# Patient Record
Sex: FEMALE | Race: WHITE
[De-identification: names, ages, dates, MRNs, and addresses within clinical notes are randomized per-mention and may not be internally consistent; named-entity substitution may affect disease eponyms.]

---

## 2017-03-02 ENCOUNTER — RECORDS - HEALTHEAST (OUTPATIENT)
Dept: ADMINISTRATIVE | Facility: OTHER | Age: 62
End: 2017-03-02

## 2017-03-24 ENCOUNTER — RECORDS - HEALTHEAST (OUTPATIENT)
Dept: ADMINISTRATIVE | Facility: OTHER | Age: 62
End: 2017-03-24

## 2017-05-01 ENCOUNTER — RECORDS - HEALTHEAST (OUTPATIENT)
Dept: ADMINISTRATIVE | Facility: OTHER | Age: 62
End: 2017-05-01

## 2017-05-15 ENCOUNTER — RECORDS - HEALTHEAST (OUTPATIENT)
Dept: ADMINISTRATIVE | Facility: OTHER | Age: 62
End: 2017-05-15

## 2017-05-16 ENCOUNTER — AMBULATORY - HEALTHEAST (OUTPATIENT)
Dept: SURGERY | Facility: CLINIC | Age: 62
End: 2017-05-16

## 2017-05-16 DIAGNOSIS — N63.20 LUMP OF BREAST, LEFT: ICD-10-CM

## 2017-05-16 DIAGNOSIS — C50.919 BREAST CANCER (H): ICD-10-CM

## 2017-05-17 LAB
LAB AP CHARGES (HE HISTORICAL CONVERSION): ABNORMAL
LAB MED GENERAL PATH INTERP (HE HISTORICAL CONVERSION): ABNORMAL
PATH REPORT.COMMENTS IMP SPEC: ABNORMAL
PATH REPORT.FINAL DX SPEC: ABNORMAL
PATH REPORT.MICROSCOPIC SPEC OTHER STN: ABNORMAL
PATH REPORT.RELEVANT HX SPEC: ABNORMAL
RESULT FLAG (HE HISTORICAL CONVERSION): ABNORMAL
SPECIMEN DESCRIPTION: ABNORMAL

## 2017-05-25 ENCOUNTER — OFFICE VISIT - HEALTHEAST (OUTPATIENT)
Dept: SURGERY | Facility: CLINIC | Age: 62
End: 2017-05-25

## 2017-05-25 DIAGNOSIS — C50.412 MALIGNANT NEOPLASM OF UPPER-OUTER QUADRANT OF LEFT FEMALE BREAST (H): ICD-10-CM

## 2017-05-25 ASSESSMENT — MIFFLIN-ST. JEOR: SCORE: 1235.23

## 2017-05-26 ENCOUNTER — AMBULATORY - HEALTHEAST (OUTPATIENT)
Dept: SURGERY | Facility: CLINIC | Age: 62
End: 2017-05-26

## 2017-05-31 ENCOUNTER — RECORDS - HEALTHEAST (OUTPATIENT)
Dept: ADMINISTRATIVE | Facility: OTHER | Age: 62
End: 2017-05-31

## 2017-05-31 ENCOUNTER — AMBULATORY - HEALTHEAST (OUTPATIENT)
Dept: SURGERY | Facility: CLINIC | Age: 62
End: 2017-05-31

## 2017-06-09 ENCOUNTER — COMMUNICATION - HEALTHEAST (OUTPATIENT)
Dept: ONCOLOGY | Facility: HOSPITAL | Age: 62
End: 2017-06-09

## 2017-06-09 ENCOUNTER — OFFICE VISIT - HEALTHEAST (OUTPATIENT)
Dept: SURGERY | Facility: CLINIC | Age: 62
End: 2017-06-09

## 2017-06-09 DIAGNOSIS — C50.412 MALIGNANT NEOPLASM OF UPPER-OUTER QUADRANT OF LEFT FEMALE BREAST (H): ICD-10-CM

## 2017-06-13 ENCOUNTER — HOSPITAL ENCOUNTER (OUTPATIENT)
Dept: PET IMAGING | Facility: HOSPITAL | Age: 62
Discharge: HOME | End: 2017-06-13
Attending: SPECIALIST

## 2017-06-13 DIAGNOSIS — C50.412 MALIGNANT NEOPLASM OF UPPER-OUTER QUADRANT OF LEFT FEMALE BREAST (H): ICD-10-CM

## 2017-06-13 ASSESSMENT — MIFFLIN-ST. JEOR: SCORE: 1221.74

## 2017-06-16 ENCOUNTER — OFFICE VISIT - HEALTHEAST (OUTPATIENT)
Dept: ONCOLOGY | Facility: HOSPITAL | Age: 62
End: 2017-06-16

## 2017-06-16 ENCOUNTER — AMBULATORY - HEALTHEAST (OUTPATIENT)
Dept: ONCOLOGY | Facility: HOSPITAL | Age: 62
End: 2017-06-16

## 2017-06-16 DIAGNOSIS — C50.412 MALIGNANT NEOPLASM OF UPPER-OUTER QUADRANT OF LEFT FEMALE BREAST (H): ICD-10-CM

## 2017-06-16 ASSESSMENT — MIFFLIN-ST. JEOR: SCORE: 1228.31

## 2017-06-19 ENCOUNTER — COMMUNICATION - HEALTHEAST (OUTPATIENT)
Dept: ONCOLOGY | Facility: HOSPITAL | Age: 62
End: 2017-06-19

## 2017-06-22 ENCOUNTER — AMBULATORY - HEALTHEAST (OUTPATIENT)
Dept: ONCOLOGY | Facility: HOSPITAL | Age: 62
End: 2017-06-22

## 2017-06-22 ENCOUNTER — COMMUNICATION - HEALTHEAST (OUTPATIENT)
Dept: ONCOLOGY | Facility: HOSPITAL | Age: 62
End: 2017-06-22

## 2017-06-22 ENCOUNTER — AMBULATORY - HEALTHEAST (OUTPATIENT)
Dept: INFUSION THERAPY | Facility: HOSPITAL | Age: 62
End: 2017-06-22

## 2017-06-22 ENCOUNTER — HOSPITAL ENCOUNTER (OUTPATIENT)
Dept: NUCLEAR MEDICINE | Facility: HOSPITAL | Age: 62
Discharge: HOME | End: 2017-06-22
Attending: INTERNAL MEDICINE

## 2017-06-22 DIAGNOSIS — C50.412 MALIGNANT NEOPLASM OF UPPER-OUTER QUADRANT OF LEFT FEMALE BREAST (H): ICD-10-CM

## 2017-06-22 LAB — MUGA LV EJECTION FRACTION: 59.4 %

## 2017-06-23 ENCOUNTER — OFFICE VISIT - HEALTHEAST (OUTPATIENT)
Dept: ONCOLOGY | Facility: HOSPITAL | Age: 62
End: 2017-06-23

## 2017-06-23 ENCOUNTER — AMBULATORY - HEALTHEAST (OUTPATIENT)
Dept: ONCOLOGY | Facility: HOSPITAL | Age: 62
End: 2017-06-23

## 2017-06-23 ENCOUNTER — INFUSION - HEALTHEAST (OUTPATIENT)
Dept: INFUSION THERAPY | Facility: HOSPITAL | Age: 62
End: 2017-06-23

## 2017-06-23 ENCOUNTER — AMBULATORY - HEALTHEAST (OUTPATIENT)
Dept: INFUSION THERAPY | Facility: HOSPITAL | Age: 62
End: 2017-06-23

## 2017-06-23 DIAGNOSIS — C50.412 MALIGNANT NEOPLASM OF UPPER-OUTER QUADRANT OF LEFT FEMALE BREAST (H): ICD-10-CM

## 2017-06-26 ENCOUNTER — COMMUNICATION - HEALTHEAST (OUTPATIENT)
Dept: ONCOLOGY | Facility: HOSPITAL | Age: 62
End: 2017-06-26

## 2017-06-28 ENCOUNTER — COMMUNICATION - HEALTHEAST (OUTPATIENT)
Dept: ONCOLOGY | Facility: HOSPITAL | Age: 62
End: 2017-06-28

## 2017-06-29 ENCOUNTER — AMBULATORY - HEALTHEAST (OUTPATIENT)
Dept: ONCOLOGY | Facility: HOSPITAL | Age: 62
End: 2017-06-29

## 2017-06-30 ENCOUNTER — AMBULATORY - HEALTHEAST (OUTPATIENT)
Dept: INFUSION THERAPY | Facility: HOSPITAL | Age: 62
End: 2017-06-30

## 2017-06-30 ENCOUNTER — OFFICE VISIT - HEALTHEAST (OUTPATIENT)
Dept: ONCOLOGY | Facility: HOSPITAL | Age: 62
End: 2017-06-30

## 2017-06-30 ENCOUNTER — AMBULATORY - HEALTHEAST (OUTPATIENT)
Dept: ONCOLOGY | Facility: HOSPITAL | Age: 62
End: 2017-06-30

## 2017-06-30 DIAGNOSIS — C50.412 MALIGNANT NEOPLASM OF UPPER-OUTER QUADRANT OF LEFT FEMALE BREAST (H): ICD-10-CM

## 2017-06-30 ASSESSMENT — MIFFLIN-ST. JEOR: SCORE: 1210.62

## 2017-07-03 ENCOUNTER — COMMUNICATION - HEALTHEAST (OUTPATIENT)
Dept: ONCOLOGY | Facility: HOSPITAL | Age: 62
End: 2017-07-03

## 2017-07-07 ENCOUNTER — OFFICE VISIT - HEALTHEAST (OUTPATIENT)
Dept: ONCOLOGY | Facility: HOSPITAL | Age: 62
End: 2017-07-07

## 2017-07-07 DIAGNOSIS — C50.412 MALIGNANT NEOPLASM OF UPPER-OUTER QUADRANT OF LEFT FEMALE BREAST (H): ICD-10-CM

## 2017-07-07 DIAGNOSIS — Z09 CHEMOTHERAPY FOLLOW-UP EXAMINATION: ICD-10-CM

## 2017-07-14 ENCOUNTER — HOSPITAL ENCOUNTER (OUTPATIENT)
Dept: RADIOLOGY | Facility: HOSPITAL | Age: 62
Discharge: HOME | End: 2017-07-14
Attending: INTERNAL MEDICINE

## 2017-07-14 ENCOUNTER — OFFICE VISIT - HEALTHEAST (OUTPATIENT)
Dept: ONCOLOGY | Facility: HOSPITAL | Age: 62
End: 2017-07-14

## 2017-07-14 ENCOUNTER — INFUSION - HEALTHEAST (OUTPATIENT)
Dept: INFUSION THERAPY | Facility: HOSPITAL | Age: 62
End: 2017-07-14

## 2017-07-14 ENCOUNTER — AMBULATORY - HEALTHEAST (OUTPATIENT)
Dept: INFUSION THERAPY | Facility: HOSPITAL | Age: 62
End: 2017-07-14

## 2017-07-14 DIAGNOSIS — C50.412 MALIGNANT NEOPLASM OF UPPER-OUTER QUADRANT OF LEFT FEMALE BREAST (H): ICD-10-CM

## 2017-07-14 DIAGNOSIS — F41.9 ANXIETY: ICD-10-CM

## 2017-07-14 DIAGNOSIS — F33.9 RECURRENT MAJOR DEPRESSIVE DISORDER (H): ICD-10-CM

## 2017-07-14 DIAGNOSIS — R05.9 COUGH: ICD-10-CM

## 2017-07-14 DIAGNOSIS — I89.0 LYMPHEDEMA OF LEFT ARM: ICD-10-CM

## 2017-07-14 ASSESSMENT — MIFFLIN-ST. JEOR: SCORE: 1224.68

## 2017-07-20 ENCOUNTER — COMMUNICATION - HEALTHEAST (OUTPATIENT)
Dept: ONCOLOGY | Facility: HOSPITAL | Age: 62
End: 2017-07-20

## 2017-07-26 ENCOUNTER — COMMUNICATION - HEALTHEAST (OUTPATIENT)
Dept: ONCOLOGY | Facility: HOSPITAL | Age: 62
End: 2017-07-26

## 2017-08-04 ENCOUNTER — COMMUNICATION - HEALTHEAST (OUTPATIENT)
Dept: ONCOLOGY | Facility: HOSPITAL | Age: 62
End: 2017-08-04

## 2017-08-04 ENCOUNTER — AMBULATORY - HEALTHEAST (OUTPATIENT)
Dept: INFUSION THERAPY | Facility: HOSPITAL | Age: 62
End: 2017-08-04

## 2017-08-04 ENCOUNTER — OFFICE VISIT - HEALTHEAST (OUTPATIENT)
Dept: ONCOLOGY | Facility: HOSPITAL | Age: 62
End: 2017-08-04

## 2017-08-04 ENCOUNTER — INFUSION - HEALTHEAST (OUTPATIENT)
Dept: INFUSION THERAPY | Facility: HOSPITAL | Age: 62
End: 2017-08-04

## 2017-08-04 DIAGNOSIS — C50.412 MALIGNANT NEOPLASM OF UPPER-OUTER QUADRANT OF LEFT FEMALE BREAST (H): ICD-10-CM

## 2017-08-18 ENCOUNTER — COMMUNICATION - HEALTHEAST (OUTPATIENT)
Dept: ONCOLOGY | Facility: HOSPITAL | Age: 62
End: 2017-08-18

## 2017-08-21 ENCOUNTER — COMMUNICATION - HEALTHEAST (OUTPATIENT)
Dept: ONCOLOGY | Facility: HOSPITAL | Age: 62
End: 2017-08-21

## 2017-08-25 ENCOUNTER — COMMUNICATION - HEALTHEAST (OUTPATIENT)
Dept: ONCOLOGY | Facility: HOSPITAL | Age: 62
End: 2017-08-25

## 2017-08-25 ENCOUNTER — AMBULATORY - HEALTHEAST (OUTPATIENT)
Dept: ONCOLOGY | Facility: HOSPITAL | Age: 62
End: 2017-08-25

## 2017-08-25 ENCOUNTER — OFFICE VISIT - HEALTHEAST (OUTPATIENT)
Dept: ONCOLOGY | Facility: HOSPITAL | Age: 62
End: 2017-08-25

## 2017-08-25 ENCOUNTER — INFUSION - HEALTHEAST (OUTPATIENT)
Dept: INFUSION THERAPY | Facility: HOSPITAL | Age: 62
End: 2017-08-25

## 2017-08-25 ENCOUNTER — AMBULATORY - HEALTHEAST (OUTPATIENT)
Dept: INFUSION THERAPY | Facility: HOSPITAL | Age: 62
End: 2017-08-25

## 2017-08-25 DIAGNOSIS — C50.412 MALIGNANT NEOPLASM OF UPPER-OUTER QUADRANT OF LEFT FEMALE BREAST (H): ICD-10-CM

## 2017-08-28 ENCOUNTER — COMMUNICATION - HEALTHEAST (OUTPATIENT)
Dept: ONCOLOGY | Facility: HOSPITAL | Age: 62
End: 2017-08-28

## 2017-09-15 ENCOUNTER — INFUSION - HEALTHEAST (OUTPATIENT)
Dept: INFUSION THERAPY | Facility: HOSPITAL | Age: 62
End: 2017-09-15

## 2017-09-15 ENCOUNTER — OFFICE VISIT - HEALTHEAST (OUTPATIENT)
Dept: ONCOLOGY | Facility: HOSPITAL | Age: 62
End: 2017-09-15

## 2017-09-15 ENCOUNTER — HOSPITAL ENCOUNTER (OUTPATIENT)
Dept: NUCLEAR MEDICINE | Facility: HOSPITAL | Age: 62
Setting detail: RADIATION/ONCOLOGY SERIES
Discharge: STILL ADMITTED OR ADMINISTRATIVE | End: 2017-09-15
Attending: INTERNAL MEDICINE

## 2017-09-15 ENCOUNTER — AMBULATORY - HEALTHEAST (OUTPATIENT)
Dept: INFUSION THERAPY | Facility: HOSPITAL | Age: 62
End: 2017-09-15

## 2017-09-15 ENCOUNTER — COMMUNICATION - HEALTHEAST (OUTPATIENT)
Dept: ONCOLOGY | Facility: HOSPITAL | Age: 62
End: 2017-09-15

## 2017-09-15 DIAGNOSIS — C50.412 MALIGNANT NEOPLASM OF UPPER-OUTER QUADRANT OF LEFT FEMALE BREAST (H): ICD-10-CM

## 2017-09-15 LAB — MUGA LV EJECTION FRACTION: 59 %

## 2017-09-18 ENCOUNTER — COMMUNICATION - HEALTHEAST (OUTPATIENT)
Dept: ONCOLOGY | Facility: HOSPITAL | Age: 62
End: 2017-09-18

## 2017-09-21 ENCOUNTER — AMBULATORY - HEALTHEAST (OUTPATIENT)
Dept: NUTRITION | Facility: HOSPITAL | Age: 62
End: 2017-09-21

## 2017-09-25 ENCOUNTER — OFFICE VISIT - HEALTHEAST (OUTPATIENT)
Dept: VASCULAR SURGERY | Facility: CLINIC | Age: 62
End: 2017-09-25

## 2017-09-25 DIAGNOSIS — M24.512 CONTRACTURE, LEFT SHOULDER: ICD-10-CM

## 2017-09-25 DIAGNOSIS — L90.5 SCAR CONDITION AND FIBROSIS OF SKIN: ICD-10-CM

## 2017-09-25 DIAGNOSIS — C50.412 MALIGNANT NEOPLASM OF UPPER-OUTER QUADRANT OF LEFT FEMALE BREAST (H): ICD-10-CM

## 2017-09-25 DIAGNOSIS — I97.2 POST-MASTECTOMY LYMPHEDEMA SYNDROME: ICD-10-CM

## 2017-10-02 ENCOUNTER — COMMUNICATION - HEALTHEAST (OUTPATIENT)
Dept: ONCOLOGY | Facility: HOSPITAL | Age: 62
End: 2017-10-02

## 2017-10-02 DIAGNOSIS — C50.412 MALIGNANT NEOPLASM OF UPPER-OUTER QUADRANT OF LEFT FEMALE BREAST (H): ICD-10-CM

## 2017-10-06 ENCOUNTER — OFFICE VISIT - HEALTHEAST (OUTPATIENT)
Dept: ONCOLOGY | Facility: HOSPITAL | Age: 62
End: 2017-10-06

## 2017-10-06 ENCOUNTER — INFUSION - HEALTHEAST (OUTPATIENT)
Dept: INFUSION THERAPY | Facility: HOSPITAL | Age: 62
End: 2017-10-06

## 2017-10-06 ENCOUNTER — AMBULATORY - HEALTHEAST (OUTPATIENT)
Dept: ONCOLOGY | Facility: HOSPITAL | Age: 62
End: 2017-10-06

## 2017-10-06 ENCOUNTER — AMBULATORY - HEALTHEAST (OUTPATIENT)
Dept: INFUSION THERAPY | Facility: HOSPITAL | Age: 62
End: 2017-10-06

## 2017-10-06 DIAGNOSIS — C50.412 MALIGNANT NEOPLASM OF UPPER-OUTER QUADRANT OF LEFT BREAST IN FEMALE, ESTROGEN RECEPTOR POSITIVE (H): ICD-10-CM

## 2017-10-06 DIAGNOSIS — Z17.0 MALIGNANT NEOPLASM OF UPPER-OUTER QUADRANT OF LEFT BREAST IN FEMALE, ESTROGEN RECEPTOR POSITIVE (H): ICD-10-CM

## 2017-10-06 DIAGNOSIS — C50.412 MALIGNANT NEOPLASM OF UPPER-OUTER QUADRANT OF LEFT FEMALE BREAST (H): ICD-10-CM

## 2017-10-06 RX ORDER — LORAZEPAM 0.5 MG/1
0.5 TABLET ORAL DAILY PRN
Status: SHIPPED | COMMUNITY
Start: 2017-09-26

## 2017-10-09 ENCOUNTER — OFFICE VISIT - HEALTHEAST (OUTPATIENT)
Dept: PHYSICAL THERAPY | Facility: REHABILITATION | Age: 62
End: 2017-10-09

## 2017-10-09 DIAGNOSIS — I97.2 POST-MASTECTOMY LYMPHEDEMA SYNDROME: ICD-10-CM

## 2017-10-09 DIAGNOSIS — M24.512 CONTRACTURE, LEFT SHOULDER: ICD-10-CM

## 2017-10-09 DIAGNOSIS — L90.5 SCAR CONDITION AND FIBROSIS OF SKIN: ICD-10-CM

## 2017-10-10 ENCOUNTER — COMMUNICATION - HEALTHEAST (OUTPATIENT)
Dept: ONCOLOGY | Facility: HOSPITAL | Age: 62
End: 2017-10-10

## 2017-10-13 ENCOUNTER — OFFICE VISIT - HEALTHEAST (OUTPATIENT)
Dept: SURGERY | Facility: CLINIC | Age: 62
End: 2017-10-13

## 2017-10-13 DIAGNOSIS — Z17.0 MALIGNANT NEOPLASM OF UPPER-OUTER QUADRANT OF LEFT BREAST IN FEMALE, ESTROGEN RECEPTOR POSITIVE (H): ICD-10-CM

## 2017-10-13 DIAGNOSIS — C50.412 MALIGNANT NEOPLASM OF UPPER-OUTER QUADRANT OF LEFT BREAST IN FEMALE, ESTROGEN RECEPTOR POSITIVE (H): ICD-10-CM

## 2017-10-16 ENCOUNTER — RECORDS - HEALTHEAST (OUTPATIENT)
Dept: ADMINISTRATIVE | Facility: OTHER | Age: 62
End: 2017-10-16

## 2017-10-19 ENCOUNTER — HOSPITAL ENCOUNTER (OUTPATIENT)
Dept: MRI IMAGING | Facility: HOSPITAL | Age: 62
Discharge: HOME | End: 2017-10-19
Attending: SPECIALIST

## 2017-10-19 DIAGNOSIS — Z17.0 MALIGNANT NEOPLASM OF UPPER-OUTER QUADRANT OF LEFT BREAST IN FEMALE, ESTROGEN RECEPTOR POSITIVE (H): ICD-10-CM

## 2017-10-19 DIAGNOSIS — C50.412 MALIGNANT NEOPLASM OF UPPER-OUTER QUADRANT OF LEFT BREAST IN FEMALE, ESTROGEN RECEPTOR POSITIVE (H): ICD-10-CM

## 2017-10-23 ENCOUNTER — AMBULATORY - HEALTHEAST (OUTPATIENT)
Dept: SURGERY | Facility: CLINIC | Age: 62
End: 2017-10-23

## 2017-10-23 DIAGNOSIS — Z17.1 MALIGNANT NEOPLASM OF CENTRAL PORTION OF LEFT BREAST IN FEMALE, ESTROGEN RECEPTOR NEGATIVE (H): ICD-10-CM

## 2017-10-23 DIAGNOSIS — C50.112 MALIGNANT NEOPLASM OF CENTRAL PORTION OF LEFT BREAST IN FEMALE, ESTROGEN RECEPTOR NEGATIVE (H): ICD-10-CM

## 2017-10-24 ENCOUNTER — OFFICE VISIT - HEALTHEAST (OUTPATIENT)
Dept: PHYSICAL THERAPY | Facility: REHABILITATION | Age: 62
End: 2017-10-24

## 2017-10-24 DIAGNOSIS — M24.512 CONTRACTURE, LEFT SHOULDER: ICD-10-CM

## 2017-10-24 DIAGNOSIS — I97.2 POST-MASTECTOMY LYMPHEDEMA SYNDROME: ICD-10-CM

## 2017-10-24 DIAGNOSIS — L90.5 SCAR CONDITION AND FIBROSIS OF SKIN: ICD-10-CM

## 2017-10-25 ENCOUNTER — COMMUNICATION - HEALTHEAST (OUTPATIENT)
Dept: ONCOLOGY | Facility: HOSPITAL | Age: 62
End: 2017-10-25

## 2017-10-26 ENCOUNTER — COMMUNICATION - HEALTHEAST (OUTPATIENT)
Dept: ONCOLOGY | Facility: CLINIC | Age: 62
End: 2017-10-26

## 2017-10-26 ENCOUNTER — OFFICE VISIT - HEALTHEAST (OUTPATIENT)
Dept: PHYSICAL THERAPY | Facility: REHABILITATION | Age: 62
End: 2017-10-26

## 2017-10-26 DIAGNOSIS — M24.512 CONTRACTURE, LEFT SHOULDER: ICD-10-CM

## 2017-10-26 DIAGNOSIS — I97.2 POST-MASTECTOMY LYMPHEDEMA SYNDROME: ICD-10-CM

## 2017-10-26 DIAGNOSIS — L90.5 SCAR CONDITION AND FIBROSIS OF SKIN: ICD-10-CM

## 2017-10-27 ENCOUNTER — AMBULATORY - HEALTHEAST (OUTPATIENT)
Dept: INFUSION THERAPY | Facility: HOSPITAL | Age: 62
End: 2017-10-27

## 2017-10-27 ENCOUNTER — INFUSION - HEALTHEAST (OUTPATIENT)
Dept: INFUSION THERAPY | Facility: HOSPITAL | Age: 62
End: 2017-10-27

## 2017-10-27 ENCOUNTER — OFFICE VISIT - HEALTHEAST (OUTPATIENT)
Dept: ONCOLOGY | Facility: HOSPITAL | Age: 62
End: 2017-10-27

## 2017-10-27 DIAGNOSIS — T45.1X5A ANEMIA ASSOCIATED WITH CHEMOTHERAPY: ICD-10-CM

## 2017-10-27 DIAGNOSIS — D69.59 CHEMOTHERAPY-INDUCED THROMBOCYTOPENIA: ICD-10-CM

## 2017-10-27 DIAGNOSIS — C50.412 MALIGNANT NEOPLASM OF UPPER-OUTER QUADRANT OF LEFT BREAST IN FEMALE, ESTROGEN RECEPTOR POSITIVE (H): ICD-10-CM

## 2017-10-27 DIAGNOSIS — Z17.0 MALIGNANT NEOPLASM OF UPPER-OUTER QUADRANT OF LEFT BREAST IN FEMALE, ESTROGEN RECEPTOR POSITIVE (H): ICD-10-CM

## 2017-10-27 DIAGNOSIS — T45.1X5A CHEMOTHERAPY-INDUCED THROMBOCYTOPENIA: ICD-10-CM

## 2017-10-27 DIAGNOSIS — D64.81 ANEMIA ASSOCIATED WITH CHEMOTHERAPY: ICD-10-CM

## 2017-10-27 DIAGNOSIS — F41.9 ANXIETY: ICD-10-CM

## 2017-10-30 ENCOUNTER — COMMUNICATION - HEALTHEAST (OUTPATIENT)
Dept: ONCOLOGY | Facility: HOSPITAL | Age: 62
End: 2017-10-30

## 2017-10-31 ENCOUNTER — OFFICE VISIT - HEALTHEAST (OUTPATIENT)
Dept: PHYSICAL THERAPY | Facility: REHABILITATION | Age: 62
End: 2017-10-31

## 2017-10-31 DIAGNOSIS — L90.5 SCAR CONDITION AND FIBROSIS OF SKIN: ICD-10-CM

## 2017-10-31 DIAGNOSIS — M24.512 CONTRACTURE, LEFT SHOULDER: ICD-10-CM

## 2017-10-31 DIAGNOSIS — I97.2 POST-MASTECTOMY LYMPHEDEMA SYNDROME: ICD-10-CM

## 2017-11-02 ENCOUNTER — HOSPITAL ENCOUNTER (OUTPATIENT)
Dept: NUTRITION | Facility: HOSPITAL | Age: 62
Discharge: HOME | End: 2017-11-02

## 2017-11-02 ENCOUNTER — OFFICE VISIT - HEALTHEAST (OUTPATIENT)
Dept: PHYSICAL THERAPY | Facility: REHABILITATION | Age: 62
End: 2017-11-02

## 2017-11-02 DIAGNOSIS — I97.2 POST-MASTECTOMY LYMPHEDEMA SYNDROME: ICD-10-CM

## 2017-11-02 DIAGNOSIS — Z17.0 MALIGNANT NEOPLASM OF UPPER-OUTER QUADRANT OF LEFT BREAST IN FEMALE, ESTROGEN RECEPTOR POSITIVE (H): ICD-10-CM

## 2017-11-02 DIAGNOSIS — C50.412 MALIGNANT NEOPLASM OF UPPER-OUTER QUADRANT OF LEFT BREAST IN FEMALE, ESTROGEN RECEPTOR POSITIVE (H): ICD-10-CM

## 2017-11-02 DIAGNOSIS — L90.5 SCAR CONDITION AND FIBROSIS OF SKIN: ICD-10-CM

## 2017-11-02 DIAGNOSIS — M24.512 CONTRACTURE, LEFT SHOULDER: ICD-10-CM

## 2017-11-03 ENCOUNTER — OFFICE VISIT - HEALTHEAST (OUTPATIENT)
Dept: PHYSICAL THERAPY | Facility: REHABILITATION | Age: 62
End: 2017-11-03

## 2017-11-03 DIAGNOSIS — I97.2 POST-MASTECTOMY LYMPHEDEMA SYNDROME: ICD-10-CM

## 2017-11-03 DIAGNOSIS — M24.512 CONTRACTURE, LEFT SHOULDER: ICD-10-CM

## 2017-11-03 DIAGNOSIS — L90.5 SCAR CONDITION AND FIBROSIS OF SKIN: ICD-10-CM

## 2017-11-06 ENCOUNTER — OFFICE VISIT - HEALTHEAST (OUTPATIENT)
Dept: PHYSICAL THERAPY | Facility: REHABILITATION | Age: 62
End: 2017-11-06

## 2017-11-06 DIAGNOSIS — Z17.0 MALIGNANT NEOPLASM OF UPPER-OUTER QUADRANT OF LEFT BREAST IN FEMALE, ESTROGEN RECEPTOR POSITIVE (H): ICD-10-CM

## 2017-11-06 DIAGNOSIS — C50.412 MALIGNANT NEOPLASM OF UPPER-OUTER QUADRANT OF LEFT BREAST IN FEMALE, ESTROGEN RECEPTOR POSITIVE (H): ICD-10-CM

## 2017-11-06 DIAGNOSIS — L90.5 SCAR CONDITION AND FIBROSIS OF SKIN: ICD-10-CM

## 2017-11-06 DIAGNOSIS — I97.2 POST-MASTECTOMY LYMPHEDEMA SYNDROME: ICD-10-CM

## 2017-11-06 DIAGNOSIS — M24.512 CONTRACTURE, LEFT SHOULDER: ICD-10-CM

## 2017-11-10 ENCOUNTER — COMMUNICATION - HEALTHEAST (OUTPATIENT)
Dept: ONCOLOGY | Facility: HOSPITAL | Age: 62
End: 2017-11-10

## 2017-11-10 ENCOUNTER — OFFICE VISIT - HEALTHEAST (OUTPATIENT)
Dept: RADIATION ONCOLOGY | Facility: HOSPITAL | Age: 62
End: 2017-11-10

## 2017-11-10 DIAGNOSIS — Z17.0 MALIGNANT NEOPLASM OF UPPER-OUTER QUADRANT OF LEFT BREAST IN FEMALE, ESTROGEN RECEPTOR POSITIVE (H): ICD-10-CM

## 2017-11-10 DIAGNOSIS — C50.412 MALIGNANT NEOPLASM OF UPPER-OUTER QUADRANT OF LEFT BREAST IN FEMALE, ESTROGEN RECEPTOR POSITIVE (H): ICD-10-CM

## 2017-11-13 ENCOUNTER — OFFICE VISIT - HEALTHEAST (OUTPATIENT)
Dept: PHYSICAL THERAPY | Facility: REHABILITATION | Age: 62
End: 2017-11-13

## 2017-11-13 DIAGNOSIS — I97.2 POST-MASTECTOMY LYMPHEDEMA SYNDROME: ICD-10-CM

## 2017-11-13 DIAGNOSIS — L90.5 SCAR CONDITION AND FIBROSIS OF SKIN: ICD-10-CM

## 2017-11-13 DIAGNOSIS — M24.512 CONTRACTURE, LEFT SHOULDER: ICD-10-CM

## 2017-11-17 ENCOUNTER — INFUSION - HEALTHEAST (OUTPATIENT)
Dept: INFUSION THERAPY | Facility: HOSPITAL | Age: 62
End: 2017-11-17

## 2017-11-17 ENCOUNTER — AMBULATORY - HEALTHEAST (OUTPATIENT)
Dept: RADIATION ONCOLOGY | Facility: HOSPITAL | Age: 62
End: 2017-11-17

## 2017-11-17 ENCOUNTER — AMBULATORY - HEALTHEAST (OUTPATIENT)
Dept: ONCOLOGY | Facility: HOSPITAL | Age: 62
End: 2017-11-17

## 2017-11-17 DIAGNOSIS — Z17.0 MALIGNANT NEOPLASM OF UPPER-OUTER QUADRANT OF LEFT BREAST IN FEMALE, ESTROGEN RECEPTOR POSITIVE (H): ICD-10-CM

## 2017-11-17 DIAGNOSIS — C50.412 MALIGNANT NEOPLASM OF UPPER-OUTER QUADRANT OF LEFT BREAST IN FEMALE, ESTROGEN RECEPTOR POSITIVE (H): ICD-10-CM

## 2017-11-17 LAB
CREAT SERPL-MCNC: 0.67 MG/DL (ref 0.6–1.1)
GFR SERPL CREATININE-BSD FRML MDRD: >60 ML/MIN/1.73M2

## 2017-11-20 ENCOUNTER — AMBULATORY - HEALTHEAST (OUTPATIENT)
Dept: RADIATION ONCOLOGY | Facility: HOSPITAL | Age: 62
End: 2017-11-20

## 2017-11-20 DIAGNOSIS — Z17.0 MALIGNANT NEOPLASM OF UPPER-OUTER QUADRANT OF LEFT BREAST IN FEMALE, ESTROGEN RECEPTOR POSITIVE (H): ICD-10-CM

## 2017-11-20 DIAGNOSIS — C50.412 MALIGNANT NEOPLASM OF UPPER-OUTER QUADRANT OF LEFT BREAST IN FEMALE, ESTROGEN RECEPTOR POSITIVE (H): ICD-10-CM

## 2017-11-21 ENCOUNTER — COMMUNICATION - HEALTHEAST (OUTPATIENT)
Dept: ONCOLOGY | Facility: HOSPITAL | Age: 62
End: 2017-11-21

## 2017-11-22 ENCOUNTER — HOSPITAL ENCOUNTER (OUTPATIENT)
Dept: PET IMAGING | Facility: HOSPITAL | Age: 62
Discharge: HOME | End: 2017-11-22
Attending: RADIOLOGY

## 2017-11-22 ENCOUNTER — HOSPITAL ENCOUNTER (OUTPATIENT)
Dept: PET IMAGING | Facility: HOSPITAL | Age: 62
Setting detail: RADIATION/ONCOLOGY SERIES
Discharge: STILL ADMITTED OR ADMINISTRATIVE | End: 2017-11-22
Attending: RADIOLOGY

## 2017-11-22 DIAGNOSIS — C50.412 MALIGNANT NEOPLASM OF UPPER-OUTER QUADRANT OF LEFT BREAST IN FEMALE, ESTROGEN RECEPTOR POSITIVE (H): ICD-10-CM

## 2017-11-22 DIAGNOSIS — Z17.0 MALIGNANT NEOPLASM OF UPPER-OUTER QUADRANT OF LEFT BREAST IN FEMALE, ESTROGEN RECEPTOR POSITIVE (H): ICD-10-CM

## 2017-11-22 ASSESSMENT — MIFFLIN-ST. JEOR: SCORE: 1153.7

## 2017-11-29 ENCOUNTER — COMMUNICATION - HEALTHEAST (OUTPATIENT)
Dept: ONCOLOGY | Facility: HOSPITAL | Age: 62
End: 2017-11-29

## 2017-11-29 ENCOUNTER — COMMUNICATION - HEALTHEAST (OUTPATIENT)
Dept: RADIATION ONCOLOGY | Facility: HOSPITAL | Age: 62
End: 2017-11-29

## 2017-11-30 ENCOUNTER — COMMUNICATION - HEALTHEAST (OUTPATIENT)
Dept: ONCOLOGY | Facility: HOSPITAL | Age: 62
End: 2017-11-30

## 2017-12-04 ENCOUNTER — AMBULATORY - HEALTHEAST (OUTPATIENT)
Dept: RADIATION ONCOLOGY | Facility: HOSPITAL | Age: 62
End: 2017-12-04

## 2017-12-04 ENCOUNTER — COMMUNICATION - HEALTHEAST (OUTPATIENT)
Dept: ONCOLOGY | Facility: HOSPITAL | Age: 62
End: 2017-12-04

## 2017-12-05 ENCOUNTER — AMBULATORY - HEALTHEAST (OUTPATIENT)
Dept: RADIATION ONCOLOGY | Facility: HOSPITAL | Age: 62
End: 2017-12-05

## 2017-12-05 ENCOUNTER — AMBULATORY - HEALTHEAST (OUTPATIENT)
Dept: ONCOLOGY | Facility: HOSPITAL | Age: 62
End: 2017-12-05

## 2017-12-05 ENCOUNTER — HOSPITAL ENCOUNTER (OUTPATIENT)
Dept: NUCLEAR MEDICINE | Facility: HOSPITAL | Age: 62
Discharge: HOME | End: 2017-12-05

## 2017-12-05 ENCOUNTER — OFFICE VISIT - HEALTHEAST (OUTPATIENT)
Dept: RADIATION ONCOLOGY | Facility: HOSPITAL | Age: 62
End: 2017-12-05

## 2017-12-05 DIAGNOSIS — C50.412 MALIGNANT NEOPLASM OF UPPER-OUTER QUADRANT OF LEFT BREAST IN FEMALE, ESTROGEN RECEPTOR POSITIVE (H): ICD-10-CM

## 2017-12-05 DIAGNOSIS — Z17.0 MALIGNANT NEOPLASM OF UPPER-OUTER QUADRANT OF LEFT BREAST IN FEMALE, ESTROGEN RECEPTOR POSITIVE (H): ICD-10-CM

## 2017-12-05 LAB
MUGA LV EJECTION FRACTION: 56.57 %
MUGA PRIOR EJECTION FRACTION: 59.25 %

## 2017-12-05 ASSESSMENT — MIFFLIN-ST. JEOR
SCORE: 1150.53
SCORE: 1153.7

## 2017-12-06 ENCOUNTER — COMMUNICATION - HEALTHEAST (OUTPATIENT)
Dept: ONCOLOGY | Facility: HOSPITAL | Age: 62
End: 2017-12-06

## 2017-12-06 ENCOUNTER — AMBULATORY - HEALTHEAST (OUTPATIENT)
Dept: RADIATION ONCOLOGY | Facility: HOSPITAL | Age: 62
End: 2017-12-06

## 2017-12-07 ENCOUNTER — AMBULATORY - HEALTHEAST (OUTPATIENT)
Dept: RADIATION ONCOLOGY | Facility: HOSPITAL | Age: 62
End: 2017-12-07

## 2017-12-08 ENCOUNTER — INFUSION - HEALTHEAST (OUTPATIENT)
Dept: INFUSION THERAPY | Facility: HOSPITAL | Age: 62
End: 2017-12-08

## 2017-12-08 ENCOUNTER — OFFICE VISIT - HEALTHEAST (OUTPATIENT)
Dept: ONCOLOGY | Facility: HOSPITAL | Age: 62
End: 2017-12-08

## 2017-12-08 ENCOUNTER — AMBULATORY - HEALTHEAST (OUTPATIENT)
Dept: RADIATION ONCOLOGY | Facility: HOSPITAL | Age: 62
End: 2017-12-08

## 2017-12-08 ENCOUNTER — AMBULATORY - HEALTHEAST (OUTPATIENT)
Dept: INFUSION THERAPY | Facility: HOSPITAL | Age: 62
End: 2017-12-08

## 2017-12-08 DIAGNOSIS — I97.2 POST-MASTECTOMY LYMPHEDEMA SYNDROME: ICD-10-CM

## 2017-12-08 DIAGNOSIS — C50.412 MALIGNANT NEOPLASM OF UPPER-OUTER QUADRANT OF LEFT BREAST IN FEMALE, ESTROGEN RECEPTOR POSITIVE (H): ICD-10-CM

## 2017-12-08 DIAGNOSIS — M24.512 CONTRACTURE, LEFT SHOULDER: ICD-10-CM

## 2017-12-08 DIAGNOSIS — L90.5 SCAR CONDITION AND FIBROSIS OF SKIN: ICD-10-CM

## 2017-12-08 DIAGNOSIS — F41.9 ANXIETY: ICD-10-CM

## 2017-12-08 DIAGNOSIS — Z17.0 MALIGNANT NEOPLASM OF UPPER-OUTER QUADRANT OF LEFT BREAST IN FEMALE, ESTROGEN RECEPTOR POSITIVE (H): ICD-10-CM

## 2017-12-11 ENCOUNTER — AMBULATORY - HEALTHEAST (OUTPATIENT)
Dept: RADIATION ONCOLOGY | Facility: HOSPITAL | Age: 62
End: 2017-12-11

## 2017-12-12 ENCOUNTER — OFFICE VISIT - HEALTHEAST (OUTPATIENT)
Dept: RADIATION ONCOLOGY | Facility: HOSPITAL | Age: 62
End: 2017-12-12

## 2017-12-12 ENCOUNTER — AMBULATORY - HEALTHEAST (OUTPATIENT)
Dept: RADIATION ONCOLOGY | Facility: HOSPITAL | Age: 62
End: 2017-12-12

## 2017-12-12 DIAGNOSIS — C50.412 MALIGNANT NEOPLASM OF UPPER-OUTER QUADRANT OF LEFT BREAST IN FEMALE, ESTROGEN RECEPTOR POSITIVE (H): ICD-10-CM

## 2017-12-12 DIAGNOSIS — Z17.0 MALIGNANT NEOPLASM OF UPPER-OUTER QUADRANT OF LEFT BREAST IN FEMALE, ESTROGEN RECEPTOR POSITIVE (H): ICD-10-CM

## 2017-12-13 ENCOUNTER — AMBULATORY - HEALTHEAST (OUTPATIENT)
Dept: RADIATION ONCOLOGY | Facility: HOSPITAL | Age: 62
End: 2017-12-13

## 2017-12-14 ENCOUNTER — AMBULATORY - HEALTHEAST (OUTPATIENT)
Dept: RADIATION ONCOLOGY | Facility: HOSPITAL | Age: 62
End: 2017-12-14

## 2017-12-15 ENCOUNTER — AMBULATORY - HEALTHEAST (OUTPATIENT)
Dept: RADIATION ONCOLOGY | Facility: HOSPITAL | Age: 62
End: 2017-12-15

## 2017-12-18 ENCOUNTER — AMBULATORY - HEALTHEAST (OUTPATIENT)
Dept: RADIATION ONCOLOGY | Facility: HOSPITAL | Age: 62
End: 2017-12-18

## 2017-12-19 ENCOUNTER — COMMUNICATION - HEALTHEAST (OUTPATIENT)
Dept: ONCOLOGY | Facility: HOSPITAL | Age: 62
End: 2017-12-19

## 2017-12-19 ENCOUNTER — OFFICE VISIT - HEALTHEAST (OUTPATIENT)
Dept: RADIATION ONCOLOGY | Facility: HOSPITAL | Age: 62
End: 2017-12-19

## 2017-12-19 ENCOUNTER — AMBULATORY - HEALTHEAST (OUTPATIENT)
Dept: RADIATION ONCOLOGY | Facility: HOSPITAL | Age: 62
End: 2017-12-19

## 2017-12-19 DIAGNOSIS — Z17.0 MALIGNANT NEOPLASM OF UPPER-OUTER QUADRANT OF LEFT BREAST IN FEMALE, ESTROGEN RECEPTOR POSITIVE (H): ICD-10-CM

## 2017-12-19 DIAGNOSIS — C50.412 MALIGNANT NEOPLASM OF UPPER-OUTER QUADRANT OF LEFT BREAST IN FEMALE, ESTROGEN RECEPTOR POSITIVE (H): ICD-10-CM

## 2017-12-20 ENCOUNTER — AMBULATORY - HEALTHEAST (OUTPATIENT)
Dept: ONCOLOGY | Facility: HOSPITAL | Age: 62
End: 2017-12-20

## 2017-12-20 ENCOUNTER — AMBULATORY - HEALTHEAST (OUTPATIENT)
Dept: RADIATION ONCOLOGY | Facility: HOSPITAL | Age: 62
End: 2017-12-20

## 2017-12-21 ENCOUNTER — AMBULATORY - HEALTHEAST (OUTPATIENT)
Dept: RADIATION ONCOLOGY | Facility: HOSPITAL | Age: 62
End: 2017-12-21

## 2017-12-22 ENCOUNTER — AMBULATORY - HEALTHEAST (OUTPATIENT)
Dept: RADIATION ONCOLOGY | Facility: HOSPITAL | Age: 62
End: 2017-12-22

## 2017-12-22 ENCOUNTER — COMMUNICATION - HEALTHEAST (OUTPATIENT)
Dept: RADIATION ONCOLOGY | Facility: HOSPITAL | Age: 62
End: 2017-12-22

## 2017-12-26 ENCOUNTER — AMBULATORY - HEALTHEAST (OUTPATIENT)
Dept: RADIATION ONCOLOGY | Facility: HOSPITAL | Age: 62
End: 2017-12-26

## 2017-12-26 ENCOUNTER — OFFICE VISIT - HEALTHEAST (OUTPATIENT)
Dept: RADIATION ONCOLOGY | Facility: HOSPITAL | Age: 62
End: 2017-12-26

## 2017-12-26 ENCOUNTER — RECORDS - HEALTHEAST (OUTPATIENT)
Dept: ADMINISTRATIVE | Facility: OTHER | Age: 62
End: 2017-12-26

## 2017-12-26 DIAGNOSIS — Z17.0 MALIGNANT NEOPLASM OF UPPER-OUTER QUADRANT OF LEFT BREAST IN FEMALE, ESTROGEN RECEPTOR POSITIVE (H): ICD-10-CM

## 2017-12-26 DIAGNOSIS — C50.412 MALIGNANT NEOPLASM OF UPPER-OUTER QUADRANT OF LEFT BREAST IN FEMALE, ESTROGEN RECEPTOR POSITIVE (H): ICD-10-CM

## 2017-12-27 ENCOUNTER — AMBULATORY - HEALTHEAST (OUTPATIENT)
Dept: RADIATION ONCOLOGY | Facility: HOSPITAL | Age: 62
End: 2017-12-27

## 2017-12-28 ENCOUNTER — AMBULATORY - HEALTHEAST (OUTPATIENT)
Dept: RADIATION ONCOLOGY | Facility: HOSPITAL | Age: 62
End: 2017-12-28

## 2017-12-28 ENCOUNTER — OFFICE VISIT - HEALTHEAST (OUTPATIENT)
Dept: VASCULAR SURGERY | Facility: CLINIC | Age: 62
End: 2017-12-28

## 2017-12-28 DIAGNOSIS — M24.512 CONTRACTURE, LEFT SHOULDER: ICD-10-CM

## 2017-12-28 DIAGNOSIS — F33.9 RECURRENT MAJOR DEPRESSIVE DISORDER (H): ICD-10-CM

## 2017-12-28 DIAGNOSIS — L90.5 SCAR CONDITION AND FIBROSIS OF SKIN: ICD-10-CM

## 2017-12-28 DIAGNOSIS — I97.2 POST-MASTECTOMY LYMPHEDEMA SYNDROME: ICD-10-CM

## 2017-12-28 DIAGNOSIS — G62.0 DRUG-INDUCED POLYNEUROPATHY (H): ICD-10-CM

## 2017-12-28 DIAGNOSIS — C50.412 MALIGNANT NEOPLASM OF UPPER-OUTER QUADRANT OF LEFT FEMALE BREAST (H): ICD-10-CM

## 2017-12-28 ASSESSMENT — MIFFLIN-ST. JEOR: SCORE: 1189.99

## 2017-12-29 ENCOUNTER — INFUSION - HEALTHEAST (OUTPATIENT)
Dept: INFUSION THERAPY | Facility: HOSPITAL | Age: 62
End: 2017-12-29

## 2017-12-29 ENCOUNTER — AMBULATORY - HEALTHEAST (OUTPATIENT)
Dept: RADIATION ONCOLOGY | Facility: HOSPITAL | Age: 62
End: 2017-12-29

## 2017-12-29 DIAGNOSIS — Z17.0 MALIGNANT NEOPLASM OF UPPER-OUTER QUADRANT OF LEFT BREAST IN FEMALE, ESTROGEN RECEPTOR POSITIVE (H): ICD-10-CM

## 2017-12-29 DIAGNOSIS — C50.412 MALIGNANT NEOPLASM OF UPPER-OUTER QUADRANT OF LEFT BREAST IN FEMALE, ESTROGEN RECEPTOR POSITIVE (H): ICD-10-CM

## 2018-01-02 ENCOUNTER — OFFICE VISIT - HEALTHEAST (OUTPATIENT)
Dept: RADIATION ONCOLOGY | Facility: HOSPITAL | Age: 63
End: 2018-01-02

## 2018-01-02 ENCOUNTER — AMBULATORY - HEALTHEAST (OUTPATIENT)
Dept: RADIATION ONCOLOGY | Facility: HOSPITAL | Age: 63
End: 2018-01-02

## 2018-01-02 DIAGNOSIS — C50.412 MALIGNANT NEOPLASM OF UPPER-OUTER QUADRANT OF LEFT BREAST IN FEMALE, ESTROGEN RECEPTOR POSITIVE (H): ICD-10-CM

## 2018-01-02 DIAGNOSIS — Z17.0 MALIGNANT NEOPLASM OF UPPER-OUTER QUADRANT OF LEFT BREAST IN FEMALE, ESTROGEN RECEPTOR POSITIVE (H): ICD-10-CM

## 2018-01-03 ENCOUNTER — AMBULATORY - HEALTHEAST (OUTPATIENT)
Dept: RADIATION ONCOLOGY | Facility: HOSPITAL | Age: 63
End: 2018-01-03

## 2018-01-04 ENCOUNTER — AMBULATORY - HEALTHEAST (OUTPATIENT)
Dept: RADIATION ONCOLOGY | Facility: HOSPITAL | Age: 63
End: 2018-01-04

## 2018-01-05 ENCOUNTER — AMBULATORY - HEALTHEAST (OUTPATIENT)
Dept: ONCOLOGY | Facility: HOSPITAL | Age: 63
End: 2018-01-05

## 2018-01-05 ENCOUNTER — AMBULATORY - HEALTHEAST (OUTPATIENT)
Dept: RADIATION ONCOLOGY | Facility: HOSPITAL | Age: 63
End: 2018-01-05

## 2018-01-08 ENCOUNTER — AMBULATORY - HEALTHEAST (OUTPATIENT)
Dept: RADIATION ONCOLOGY | Facility: HOSPITAL | Age: 63
End: 2018-01-08

## 2018-01-09 ENCOUNTER — OFFICE VISIT - HEALTHEAST (OUTPATIENT)
Dept: RADIATION ONCOLOGY | Facility: HOSPITAL | Age: 63
End: 2018-01-09

## 2018-01-09 ENCOUNTER — AMBULATORY - HEALTHEAST (OUTPATIENT)
Dept: RADIATION ONCOLOGY | Facility: HOSPITAL | Age: 63
End: 2018-01-09

## 2018-01-09 DIAGNOSIS — Z17.0 MALIGNANT NEOPLASM OF UPPER-OUTER QUADRANT OF LEFT BREAST IN FEMALE, ESTROGEN RECEPTOR POSITIVE (H): ICD-10-CM

## 2018-01-09 DIAGNOSIS — C50.412 MALIGNANT NEOPLASM OF UPPER-OUTER QUADRANT OF LEFT BREAST IN FEMALE, ESTROGEN RECEPTOR POSITIVE (H): ICD-10-CM

## 2018-01-10 ENCOUNTER — AMBULATORY - HEALTHEAST (OUTPATIENT)
Dept: RADIATION ONCOLOGY | Facility: HOSPITAL | Age: 63
End: 2018-01-10

## 2018-01-10 ENCOUNTER — AMBULATORY - HEALTHEAST (OUTPATIENT)
Dept: ONCOLOGY | Facility: HOSPITAL | Age: 63
End: 2018-01-10

## 2018-01-10 ENCOUNTER — COMMUNICATION - HEALTHEAST (OUTPATIENT)
Dept: ONCOLOGY | Facility: HOSPITAL | Age: 63
End: 2018-01-10

## 2018-01-11 ENCOUNTER — AMBULATORY - HEALTHEAST (OUTPATIENT)
Dept: RADIATION ONCOLOGY | Facility: HOSPITAL | Age: 63
End: 2018-01-11

## 2018-01-11 ENCOUNTER — OFFICE VISIT - HEALTHEAST (OUTPATIENT)
Dept: ONCOLOGY | Facility: HOSPITAL | Age: 63
End: 2018-01-11

## 2018-01-11 DIAGNOSIS — F41.1 GENERALIZED ANXIETY DISORDER: ICD-10-CM

## 2018-01-11 DIAGNOSIS — F34.1 DYSTHYMIA: ICD-10-CM

## 2018-01-11 DIAGNOSIS — F41.0 PANIC DISORDER: ICD-10-CM

## 2018-01-12 ENCOUNTER — AMBULATORY - HEALTHEAST (OUTPATIENT)
Dept: RADIATION ONCOLOGY | Facility: HOSPITAL | Age: 63
End: 2018-01-12

## 2018-01-15 ENCOUNTER — AMBULATORY - HEALTHEAST (OUTPATIENT)
Dept: RADIATION ONCOLOGY | Facility: HOSPITAL | Age: 63
End: 2018-01-15

## 2018-01-16 ENCOUNTER — AMBULATORY - HEALTHEAST (OUTPATIENT)
Dept: RADIATION ONCOLOGY | Facility: HOSPITAL | Age: 63
End: 2018-01-16

## 2018-01-16 ENCOUNTER — OFFICE VISIT - HEALTHEAST (OUTPATIENT)
Dept: RADIATION ONCOLOGY | Facility: HOSPITAL | Age: 63
End: 2018-01-16

## 2018-01-16 DIAGNOSIS — Z17.0 MALIGNANT NEOPLASM OF UPPER-OUTER QUADRANT OF LEFT BREAST IN FEMALE, ESTROGEN RECEPTOR POSITIVE (H): ICD-10-CM

## 2018-01-16 DIAGNOSIS — C50.412 MALIGNANT NEOPLASM OF UPPER-OUTER QUADRANT OF LEFT BREAST IN FEMALE, ESTROGEN RECEPTOR POSITIVE (H): ICD-10-CM

## 2018-01-17 ENCOUNTER — AMBULATORY - HEALTHEAST (OUTPATIENT)
Dept: RADIATION ONCOLOGY | Facility: HOSPITAL | Age: 63
End: 2018-01-17

## 2018-01-18 ENCOUNTER — AMBULATORY - HEALTHEAST (OUTPATIENT)
Dept: RADIATION ONCOLOGY | Facility: HOSPITAL | Age: 63
End: 2018-01-18

## 2018-01-18 ENCOUNTER — RECORDS - HEALTHEAST (OUTPATIENT)
Dept: ADMINISTRATIVE | Facility: OTHER | Age: 63
End: 2018-01-18

## 2018-01-19 ENCOUNTER — AMBULATORY - HEALTHEAST (OUTPATIENT)
Dept: INFUSION THERAPY | Facility: HOSPITAL | Age: 63
End: 2018-01-19

## 2018-01-19 ENCOUNTER — OFFICE VISIT - HEALTHEAST (OUTPATIENT)
Dept: ONCOLOGY | Facility: HOSPITAL | Age: 63
End: 2018-01-19

## 2018-01-19 ENCOUNTER — INFUSION - HEALTHEAST (OUTPATIENT)
Dept: INFUSION THERAPY | Facility: HOSPITAL | Age: 63
End: 2018-01-19

## 2018-01-19 ENCOUNTER — AMBULATORY - HEALTHEAST (OUTPATIENT)
Dept: RADIATION ONCOLOGY | Facility: HOSPITAL | Age: 63
End: 2018-01-19

## 2018-01-19 DIAGNOSIS — C50.412 MALIGNANT NEOPLASM OF UPPER-OUTER QUADRANT OF LEFT BREAST IN FEMALE, ESTROGEN RECEPTOR POSITIVE (H): ICD-10-CM

## 2018-01-19 DIAGNOSIS — Z17.0 MALIGNANT NEOPLASM OF UPPER-OUTER QUADRANT OF LEFT BREAST IN FEMALE, ESTROGEN RECEPTOR POSITIVE (H): ICD-10-CM

## 2018-01-19 LAB — MAGNESIUM SERPL-MCNC: 2 MG/DL (ref 1.8–2.6)

## 2018-01-20 ENCOUNTER — RECORDS - HEALTHEAST (OUTPATIENT)
Dept: ADMINISTRATIVE | Facility: OTHER | Age: 63
End: 2018-01-20

## 2018-01-22 ENCOUNTER — OFFICE VISIT - HEALTHEAST (OUTPATIENT)
Dept: RADIATION ONCOLOGY | Facility: HOSPITAL | Age: 63
End: 2018-01-22

## 2018-01-22 ENCOUNTER — RECORDS - HEALTHEAST (OUTPATIENT)
Dept: ADMINISTRATIVE | Facility: OTHER | Age: 63
End: 2018-01-22

## 2018-01-22 ENCOUNTER — AMBULATORY - HEALTHEAST (OUTPATIENT)
Dept: ONCOLOGY | Facility: HOSPITAL | Age: 63
End: 2018-01-22

## 2018-01-22 ENCOUNTER — AMBULATORY - HEALTHEAST (OUTPATIENT)
Dept: RADIATION ONCOLOGY | Facility: HOSPITAL | Age: 63
End: 2018-01-22

## 2018-01-22 ENCOUNTER — AMBULATORY - HEALTHEAST (OUTPATIENT)
Dept: RADIATION ONCOLOGY | Age: 63
End: 2018-01-22

## 2018-01-22 DIAGNOSIS — L03.032 INFECTION OF NAIL BED OF TOE OF LEFT FOOT: ICD-10-CM

## 2018-01-22 DIAGNOSIS — C50.412 MALIGNANT NEOPLASM OF UPPER-OUTER QUADRANT OF LEFT BREAST IN FEMALE, ESTROGEN RECEPTOR POSITIVE (H): ICD-10-CM

## 2018-01-22 DIAGNOSIS — L08.9 TOE INFECTION: ICD-10-CM

## 2018-01-22 DIAGNOSIS — Z17.0 MALIGNANT NEOPLASM OF UPPER-OUTER QUADRANT OF LEFT BREAST IN FEMALE, ESTROGEN RECEPTOR POSITIVE (H): ICD-10-CM

## 2018-01-23 ENCOUNTER — AMBULATORY - HEALTHEAST (OUTPATIENT)
Dept: RADIATION ONCOLOGY | Facility: HOSPITAL | Age: 63
End: 2018-01-23

## 2018-02-01 ENCOUNTER — COMMUNICATION - HEALTHEAST (OUTPATIENT)
Dept: RADIATION ONCOLOGY | Facility: HOSPITAL | Age: 63
End: 2018-02-01

## 2018-02-07 ENCOUNTER — OFFICE VISIT - HEALTHEAST (OUTPATIENT)
Dept: PODIATRY | Age: 63
End: 2018-02-07

## 2018-02-07 DIAGNOSIS — G62.0 DRUG-INDUCED POLYNEUROPATHY (H): ICD-10-CM

## 2018-02-07 DIAGNOSIS — L60.0 INGROWN TOENAIL: ICD-10-CM

## 2018-02-07 ASSESSMENT — MIFFLIN-ST. JEOR: SCORE: 1187.72

## 2018-02-09 ENCOUNTER — INFUSION - HEALTHEAST (OUTPATIENT)
Dept: INFUSION THERAPY | Facility: HOSPITAL | Age: 63
End: 2018-02-09

## 2018-02-09 DIAGNOSIS — Z17.0 MALIGNANT NEOPLASM OF UPPER-OUTER QUADRANT OF LEFT BREAST IN FEMALE, ESTROGEN RECEPTOR POSITIVE (H): ICD-10-CM

## 2018-02-09 DIAGNOSIS — C50.412 MALIGNANT NEOPLASM OF UPPER-OUTER QUADRANT OF LEFT BREAST IN FEMALE, ESTROGEN RECEPTOR POSITIVE (H): ICD-10-CM

## 2018-02-09 ASSESSMENT — MIFFLIN-ST. JEOR: SCORE: 1188

## 2018-02-26 ENCOUNTER — COMMUNICATION - HEALTHEAST (OUTPATIENT)
Dept: ONCOLOGY | Facility: CLINIC | Age: 63
End: 2018-02-26

## 2018-02-27 ENCOUNTER — OFFICE VISIT - HEALTHEAST (OUTPATIENT)
Dept: RADIATION ONCOLOGY | Facility: HOSPITAL | Age: 63
End: 2018-02-27

## 2018-02-27 DIAGNOSIS — Z17.0 MALIGNANT NEOPLASM OF UPPER-OUTER QUADRANT OF LEFT BREAST IN FEMALE, ESTROGEN RECEPTOR POSITIVE (H): ICD-10-CM

## 2018-02-27 DIAGNOSIS — C50.412 MALIGNANT NEOPLASM OF UPPER-OUTER QUADRANT OF LEFT BREAST IN FEMALE, ESTROGEN RECEPTOR POSITIVE (H): ICD-10-CM

## 2018-02-27 RX ORDER — QUETIAPINE FUMARATE 50 MG/1
50 TABLET, FILM COATED ORAL AT BEDTIME
Status: SHIPPED | COMMUNITY
Start: 2018-02-22

## 2018-02-27 ASSESSMENT — MIFFLIN-ST. JEOR: SCORE: 1189.98

## 2018-03-01 ENCOUNTER — HOSPITAL ENCOUNTER (OUTPATIENT)
Dept: NUCLEAR MEDICINE | Facility: HOSPITAL | Age: 63
Discharge: HOME | End: 2018-03-01
Attending: INTERNAL MEDICINE

## 2018-03-01 ENCOUNTER — OFFICE VISIT - HEALTHEAST (OUTPATIENT)
Dept: ONCOLOGY | Facility: HOSPITAL | Age: 63
End: 2018-03-01

## 2018-03-01 DIAGNOSIS — F41.0 PANIC DISORDER: ICD-10-CM

## 2018-03-01 DIAGNOSIS — F34.1 DYSTHYMIA: ICD-10-CM

## 2018-03-01 DIAGNOSIS — C50.412 MALIGNANT NEOPLASM OF UPPER-OUTER QUADRANT OF LEFT BREAST IN FEMALE, ESTROGEN RECEPTOR POSITIVE (H): ICD-10-CM

## 2018-03-01 DIAGNOSIS — Z17.0 MALIGNANT NEOPLASM OF UPPER-OUTER QUADRANT OF LEFT BREAST IN FEMALE, ESTROGEN RECEPTOR POSITIVE (H): ICD-10-CM

## 2018-03-01 DIAGNOSIS — F41.1 GENERALIZED ANXIETY DISORDER: ICD-10-CM

## 2018-03-01 LAB
MUGA LV EJECTION FRACTION: 63.53 %
MUGA PRIOR EJECTION FRACTION: 56.57 %

## 2018-03-02 ENCOUNTER — AMBULATORY - HEALTHEAST (OUTPATIENT)
Dept: SCHEDULING | Facility: CLINIC | Age: 63
End: 2018-03-02

## 2018-03-02 ENCOUNTER — INFUSION - HEALTHEAST (OUTPATIENT)
Dept: INFUSION THERAPY | Facility: HOSPITAL | Age: 63
End: 2018-03-02

## 2018-03-02 ENCOUNTER — COMMUNICATION - HEALTHEAST (OUTPATIENT)
Dept: ONCOLOGY | Facility: HOSPITAL | Age: 63
End: 2018-03-02

## 2018-03-02 ENCOUNTER — HOSPITAL ENCOUNTER (OUTPATIENT)
Dept: RADIOLOGY | Facility: HOSPITAL | Age: 63
Discharge: HOME | End: 2018-03-02
Attending: INTERNAL MEDICINE

## 2018-03-02 ENCOUNTER — AMBULATORY - HEALTHEAST (OUTPATIENT)
Dept: ONCOLOGY | Facility: HOSPITAL | Age: 63
End: 2018-03-02

## 2018-03-02 ENCOUNTER — OFFICE VISIT - HEALTHEAST (OUTPATIENT)
Dept: ONCOLOGY | Facility: HOSPITAL | Age: 63
End: 2018-03-02

## 2018-03-02 ENCOUNTER — AMBULATORY - HEALTHEAST (OUTPATIENT)
Dept: INFUSION THERAPY | Facility: HOSPITAL | Age: 63
End: 2018-03-02

## 2018-03-02 DIAGNOSIS — C50.412 MALIGNANT NEOPLASM OF UPPER-OUTER QUADRANT OF LEFT BREAST IN FEMALE, ESTROGEN RECEPTOR POSITIVE (H): ICD-10-CM

## 2018-03-02 DIAGNOSIS — H53.8 BLURRY VISION, BILATERAL: ICD-10-CM

## 2018-03-02 DIAGNOSIS — Z78.0 POST-MENOPAUSAL: ICD-10-CM

## 2018-03-02 DIAGNOSIS — R07.89 RIGHT-SIDED CHEST WALL PAIN: ICD-10-CM

## 2018-03-02 DIAGNOSIS — Z17.0 MALIGNANT NEOPLASM OF UPPER-OUTER QUADRANT OF LEFT BREAST IN FEMALE, ESTROGEN RECEPTOR POSITIVE (H): ICD-10-CM

## 2018-03-02 LAB
ALBUMIN SERPL-MCNC: 3.6 G/DL (ref 3.5–5)
ALP SERPL-CCNC: 94 U/L (ref 45–120)
ALT SERPL W P-5'-P-CCNC: 19 U/L (ref 0–45)
ANION GAP SERPL CALCULATED.3IONS-SCNC: 10 MMOL/L (ref 5–18)
AST SERPL W P-5'-P-CCNC: 16 U/L (ref 0–40)
BASOPHILS # BLD AUTO: 0 THOU/UL (ref 0–0.2)
BASOPHILS NFR BLD AUTO: 0 % (ref 0–2)
BILIRUB SERPL-MCNC: 0.2 MG/DL (ref 0–1)
BUN SERPL-MCNC: 9 MG/DL (ref 8–22)
CALCIUM SERPL-MCNC: 9.1 MG/DL (ref 8.5–10.5)
CHLORIDE BLD-SCNC: 109 MMOL/L (ref 98–107)
CO2 SERPL-SCNC: 25 MMOL/L (ref 22–31)
CREAT SERPL-MCNC: 0.72 MG/DL (ref 0.6–1.1)
EOSINOPHIL # BLD AUTO: 0.1 THOU/UL (ref 0–0.4)
EOSINOPHIL NFR BLD AUTO: 2 % (ref 0–6)
ERYTHROCYTE [DISTWIDTH] IN BLOOD BY AUTOMATED COUNT: 13.5 % (ref 11–14.5)
GFR SERPL CREATININE-BSD FRML MDRD: >60 ML/MIN/1.73M2
GLUCOSE BLD-MCNC: 113 MG/DL (ref 70–125)
HCT VFR BLD AUTO: 32.5 % (ref 35–47)
HGB BLD-MCNC: 10.9 G/DL (ref 12–16)
LYMPHOCYTES # BLD AUTO: 1 THOU/UL (ref 0.8–4.4)
LYMPHOCYTES NFR BLD AUTO: 27 % (ref 20–40)
MAGNESIUM SERPL-MCNC: 1.9 MG/DL (ref 1.8–2.6)
MCH RBC QN AUTO: 30 PG (ref 27–34)
MCHC RBC AUTO-ENTMCNC: 33.5 G/DL (ref 32–36)
MCV RBC AUTO: 90 FL (ref 80–100)
MONOCYTES # BLD AUTO: 0.3 THOU/UL (ref 0–0.9)
MONOCYTES NFR BLD AUTO: 8 % (ref 2–10)
NEUTROPHILS # BLD AUTO: 2.5 THOU/UL (ref 2–7.7)
NEUTROPHILS NFR BLD AUTO: 64 % (ref 50–70)
PLATELET # BLD AUTO: 190 THOU/UL (ref 140–440)
PMV BLD AUTO: 10 FL (ref 8.5–12.5)
POTASSIUM BLD-SCNC: 4.2 MMOL/L (ref 3.5–5)
PROT SERPL-MCNC: 7.3 G/DL (ref 6–8)
RBC # BLD AUTO: 3.63 MILL/UL (ref 3.8–5.4)
SODIUM SERPL-SCNC: 144 MMOL/L (ref 136–145)
WBC: 3.9 THOU/UL (ref 4–11)

## 2018-03-06 ENCOUNTER — AMBULATORY - HEALTHEAST (OUTPATIENT)
Dept: ONCOLOGY | Facility: HOSPITAL | Age: 63
End: 2018-03-06

## 2018-03-08 ENCOUNTER — OFFICE VISIT - HEALTHEAST (OUTPATIENT)
Dept: VASCULAR SURGERY | Facility: CLINIC | Age: 63
End: 2018-03-08

## 2018-03-08 DIAGNOSIS — C50.412 MALIGNANT NEOPLASM OF UPPER-OUTER QUADRANT OF LEFT FEMALE BREAST (H): ICD-10-CM

## 2018-03-08 DIAGNOSIS — L03.114 LEFT ARM CELLULITIS: ICD-10-CM

## 2018-03-08 DIAGNOSIS — M25.512 ACUTE PAIN OF LEFT SHOULDER: ICD-10-CM

## 2018-03-08 DIAGNOSIS — I97.2 POST-MASTECTOMY LYMPHEDEMA SYNDROME: ICD-10-CM

## 2018-03-08 DIAGNOSIS — M24.512 CONTRACTURE, LEFT SHOULDER: ICD-10-CM

## 2018-03-08 DIAGNOSIS — L90.5 SCAR CONDITION AND FIBROSIS OF SKIN: ICD-10-CM

## 2018-03-08 DIAGNOSIS — S46.002A INJURY OF LEFT ROTATOR CUFF, INITIAL ENCOUNTER: ICD-10-CM

## 2018-03-08 ASSESSMENT — MIFFLIN-ST. JEOR: SCORE: 1183.18

## 2018-03-15 ENCOUNTER — OFFICE VISIT - HEALTHEAST (OUTPATIENT)
Dept: ONCOLOGY | Facility: HOSPITAL | Age: 63
End: 2018-03-15

## 2018-03-15 DIAGNOSIS — F41.0 PANIC DISORDER: ICD-10-CM

## 2018-03-15 DIAGNOSIS — Z17.0 MALIGNANT NEOPLASM OF UPPER-OUTER QUADRANT OF LEFT BREAST IN FEMALE, ESTROGEN RECEPTOR POSITIVE (H): ICD-10-CM

## 2018-03-15 DIAGNOSIS — F34.1 DYSTHYMIA: ICD-10-CM

## 2018-03-15 DIAGNOSIS — C50.412 MALIGNANT NEOPLASM OF UPPER-OUTER QUADRANT OF LEFT BREAST IN FEMALE, ESTROGEN RECEPTOR POSITIVE (H): ICD-10-CM

## 2018-03-15 DIAGNOSIS — F41.1 GENERALIZED ANXIETY DISORDER: ICD-10-CM

## 2018-03-16 ENCOUNTER — COMMUNICATION - HEALTHEAST (OUTPATIENT)
Dept: ONCOLOGY | Facility: HOSPITAL | Age: 63
End: 2018-03-16

## 2018-03-20 ENCOUNTER — COMMUNICATION - HEALTHEAST (OUTPATIENT)
Dept: ONCOLOGY | Facility: CLINIC | Age: 63
End: 2018-03-20

## 2018-03-20 ENCOUNTER — OFFICE VISIT - HEALTHEAST (OUTPATIENT)
Dept: PHYSICAL THERAPY | Facility: REHABILITATION | Age: 63
End: 2018-03-20

## 2018-03-20 DIAGNOSIS — M24.512 CONTRACTURE, LEFT SHOULDER: ICD-10-CM

## 2018-03-20 DIAGNOSIS — S46.002A INJURY OF LEFT ROTATOR CUFF, INITIAL ENCOUNTER: ICD-10-CM

## 2018-03-20 DIAGNOSIS — L90.5 SCAR CONDITION AND FIBROSIS OF SKIN: ICD-10-CM

## 2018-03-23 ENCOUNTER — INFUSION - HEALTHEAST (OUTPATIENT)
Dept: INFUSION THERAPY | Facility: HOSPITAL | Age: 63
End: 2018-03-23

## 2018-03-23 DIAGNOSIS — C50.412 MALIGNANT NEOPLASM OF UPPER-OUTER QUADRANT OF LEFT BREAST IN FEMALE, ESTROGEN RECEPTOR POSITIVE (H): ICD-10-CM

## 2018-03-23 DIAGNOSIS — Z17.0 MALIGNANT NEOPLASM OF UPPER-OUTER QUADRANT OF LEFT BREAST IN FEMALE, ESTROGEN RECEPTOR POSITIVE (H): ICD-10-CM

## 2018-03-26 ENCOUNTER — OFFICE VISIT - HEALTHEAST (OUTPATIENT)
Dept: PHYSICAL THERAPY | Facility: REHABILITATION | Age: 63
End: 2018-03-26

## 2018-03-26 DIAGNOSIS — Z17.0 MALIGNANT NEOPLASM OF UPPER-OUTER QUADRANT OF LEFT BREAST IN FEMALE, ESTROGEN RECEPTOR POSITIVE (H): ICD-10-CM

## 2018-03-26 DIAGNOSIS — S46.002A INJURY OF LEFT ROTATOR CUFF, INITIAL ENCOUNTER: ICD-10-CM

## 2018-03-26 DIAGNOSIS — L90.5 SCAR CONDITION AND FIBROSIS OF SKIN: ICD-10-CM

## 2018-03-26 DIAGNOSIS — I97.2 POST-MASTECTOMY LYMPHEDEMA SYNDROME: ICD-10-CM

## 2018-03-26 DIAGNOSIS — C50.412 MALIGNANT NEOPLASM OF UPPER-OUTER QUADRANT OF LEFT BREAST IN FEMALE, ESTROGEN RECEPTOR POSITIVE (H): ICD-10-CM

## 2018-03-26 DIAGNOSIS — M24.512 CONTRACTURE, LEFT SHOULDER: ICD-10-CM

## 2018-03-27 ENCOUNTER — COMMUNICATION - HEALTHEAST (OUTPATIENT)
Dept: ONCOLOGY | Facility: CLINIC | Age: 63
End: 2018-03-27

## 2018-03-28 ENCOUNTER — COMMUNICATION - HEALTHEAST (OUTPATIENT)
Dept: VASCULAR SURGERY | Facility: CLINIC | Age: 63
End: 2018-03-28

## 2018-03-28 ENCOUNTER — OFFICE VISIT - HEALTHEAST (OUTPATIENT)
Dept: PHYSICAL THERAPY | Facility: REHABILITATION | Age: 63
End: 2018-03-28

## 2018-03-28 DIAGNOSIS — S46.002A INJURY OF LEFT ROTATOR CUFF, INITIAL ENCOUNTER: ICD-10-CM

## 2018-03-28 DIAGNOSIS — I97.2 POST-MASTECTOMY LYMPHEDEMA SYNDROME: ICD-10-CM

## 2018-03-28 DIAGNOSIS — M24.512 CONTRACTURE, LEFT SHOULDER: ICD-10-CM

## 2018-03-28 DIAGNOSIS — L90.5 SCAR CONDITION AND FIBROSIS OF SKIN: ICD-10-CM

## 2018-03-29 ENCOUNTER — OFFICE VISIT - HEALTHEAST (OUTPATIENT)
Dept: ONCOLOGY | Facility: HOSPITAL | Age: 63
End: 2018-03-29

## 2018-03-29 DIAGNOSIS — F41.1 GENERALIZED ANXIETY DISORDER: ICD-10-CM

## 2018-03-29 DIAGNOSIS — F41.0 PANIC DISORDER: ICD-10-CM

## 2018-03-29 DIAGNOSIS — F34.1 DYSTHYMIA: ICD-10-CM

## 2018-03-29 DIAGNOSIS — C50.412 MALIGNANT NEOPLASM OF UPPER-OUTER QUADRANT OF LEFT BREAST IN FEMALE, ESTROGEN RECEPTOR POSITIVE (H): ICD-10-CM

## 2018-03-29 DIAGNOSIS — F43.10 PTSD (POST-TRAUMATIC STRESS DISORDER): ICD-10-CM

## 2018-03-29 DIAGNOSIS — Z17.0 MALIGNANT NEOPLASM OF UPPER-OUTER QUADRANT OF LEFT BREAST IN FEMALE, ESTROGEN RECEPTOR POSITIVE (H): ICD-10-CM

## 2018-04-02 ENCOUNTER — OFFICE VISIT - HEALTHEAST (OUTPATIENT)
Dept: PHYSICAL THERAPY | Facility: REHABILITATION | Age: 63
End: 2018-04-02

## 2018-04-02 DIAGNOSIS — S46.002D INJURY OF LEFT ROTATOR CUFF, SUBSEQUENT ENCOUNTER: ICD-10-CM

## 2018-04-02 DIAGNOSIS — L90.5 SCAR CONDITION AND FIBROSIS OF SKIN: ICD-10-CM

## 2018-04-02 DIAGNOSIS — I97.2 POST-MASTECTOMY LYMPHEDEMA SYNDROME: ICD-10-CM

## 2018-04-02 DIAGNOSIS — M24.512 CONTRACTURE, LEFT SHOULDER: ICD-10-CM

## 2018-04-04 ENCOUNTER — OFFICE VISIT - HEALTHEAST (OUTPATIENT)
Dept: PHYSICAL THERAPY | Facility: REHABILITATION | Age: 63
End: 2018-04-04

## 2018-04-04 DIAGNOSIS — S46.002A INJURY OF LEFT ROTATOR CUFF, INITIAL ENCOUNTER: ICD-10-CM

## 2018-04-04 DIAGNOSIS — L90.5 SCAR CONDITION AND FIBROSIS OF SKIN: ICD-10-CM

## 2018-04-04 DIAGNOSIS — M24.512 CONTRACTURE, LEFT SHOULDER: ICD-10-CM

## 2018-04-04 DIAGNOSIS — G62.0 DRUG-INDUCED POLYNEUROPATHY (H): ICD-10-CM

## 2018-04-04 DIAGNOSIS — I97.2 POST-MASTECTOMY LYMPHEDEMA SYNDROME: ICD-10-CM

## 2018-04-04 DIAGNOSIS — S46.002D INJURY OF LEFT ROTATOR CUFF, SUBSEQUENT ENCOUNTER: ICD-10-CM

## 2018-04-12 ENCOUNTER — COMMUNICATION - HEALTHEAST (OUTPATIENT)
Dept: ONCOLOGY | Facility: HOSPITAL | Age: 63
End: 2018-04-12

## 2018-04-12 ENCOUNTER — OFFICE VISIT - HEALTHEAST (OUTPATIENT)
Dept: PHYSICAL THERAPY | Facility: REHABILITATION | Age: 63
End: 2018-04-12

## 2018-04-12 DIAGNOSIS — C50.412 MALIGNANT NEOPLASM OF UPPER-OUTER QUADRANT OF LEFT FEMALE BREAST (H): ICD-10-CM

## 2018-04-12 DIAGNOSIS — M24.512 CONTRACTURE, LEFT SHOULDER: ICD-10-CM

## 2018-04-12 DIAGNOSIS — L90.5 SCAR CONDITION AND FIBROSIS OF SKIN: ICD-10-CM

## 2018-04-12 DIAGNOSIS — I97.2 POST-MASTECTOMY LYMPHEDEMA SYNDROME: ICD-10-CM

## 2018-04-12 DIAGNOSIS — S46.002D INJURY OF LEFT ROTATOR CUFF, SUBSEQUENT ENCOUNTER: ICD-10-CM

## 2018-04-12 DIAGNOSIS — G62.0 DRUG-INDUCED POLYNEUROPATHY (H): ICD-10-CM

## 2018-04-13 ENCOUNTER — OFFICE VISIT - HEALTHEAST (OUTPATIENT)
Dept: ONCOLOGY | Facility: HOSPITAL | Age: 63
End: 2018-04-13

## 2018-04-13 ENCOUNTER — AMBULATORY - HEALTHEAST (OUTPATIENT)
Dept: INFUSION THERAPY | Facility: HOSPITAL | Age: 63
End: 2018-04-13

## 2018-04-13 ENCOUNTER — INFUSION - HEALTHEAST (OUTPATIENT)
Dept: INFUSION THERAPY | Facility: HOSPITAL | Age: 63
End: 2018-04-13

## 2018-04-13 DIAGNOSIS — C50.412 MALIGNANT NEOPLASM OF UPPER-OUTER QUADRANT OF LEFT BREAST IN FEMALE, ESTROGEN RECEPTOR POSITIVE (H): ICD-10-CM

## 2018-04-13 DIAGNOSIS — Z17.0 MALIGNANT NEOPLASM OF UPPER-OUTER QUADRANT OF LEFT BREAST IN FEMALE, ESTROGEN RECEPTOR POSITIVE (H): ICD-10-CM

## 2018-04-13 LAB
ALBUMIN SERPL-MCNC: 3.7 G/DL (ref 3.5–5)
ALP SERPL-CCNC: 83 U/L (ref 45–120)
ALT SERPL W P-5'-P-CCNC: 14 U/L (ref 0–45)
ANION GAP SERPL CALCULATED.3IONS-SCNC: 11 MMOL/L (ref 5–18)
AST SERPL W P-5'-P-CCNC: 16 U/L (ref 0–40)
BASOPHILS # BLD AUTO: 0 THOU/UL (ref 0–0.2)
BASOPHILS NFR BLD AUTO: 0 % (ref 0–2)
BILIRUB SERPL-MCNC: 0.3 MG/DL (ref 0–1)
BUN SERPL-MCNC: 13 MG/DL (ref 8–22)
CALCIUM SERPL-MCNC: 9.8 MG/DL (ref 8.5–10.5)
CHLORIDE BLD-SCNC: 105 MMOL/L (ref 98–107)
CO2 SERPL-SCNC: 27 MMOL/L (ref 22–31)
CREAT SERPL-MCNC: 0.74 MG/DL (ref 0.6–1.1)
EOSINOPHIL # BLD AUTO: 0.1 THOU/UL (ref 0–0.4)
EOSINOPHIL NFR BLD AUTO: 1 % (ref 0–6)
ERYTHROCYTE [DISTWIDTH] IN BLOOD BY AUTOMATED COUNT: 12.7 % (ref 11–14.5)
GFR SERPL CREATININE-BSD FRML MDRD: >60 ML/MIN/1.73M2
GLUCOSE BLD-MCNC: 111 MG/DL (ref 70–125)
HCT VFR BLD AUTO: 31.1 % (ref 35–47)
HGB BLD-MCNC: 10.4 G/DL (ref 12–16)
LYMPHOCYTES # BLD AUTO: 1 THOU/UL (ref 0.8–4.4)
LYMPHOCYTES NFR BLD AUTO: 21 % (ref 20–40)
MAGNESIUM SERPL-MCNC: 2 MG/DL (ref 1.8–2.6)
MCH RBC QN AUTO: 30.1 PG (ref 27–34)
MCHC RBC AUTO-ENTMCNC: 33.4 G/DL (ref 32–36)
MCV RBC AUTO: 90 FL (ref 80–100)
MONOCYTES # BLD AUTO: 0.4 THOU/UL (ref 0–0.9)
MONOCYTES NFR BLD AUTO: 8 % (ref 2–10)
NEUTROPHILS # BLD AUTO: 3.4 THOU/UL (ref 2–7.7)
NEUTROPHILS NFR BLD AUTO: 70 % (ref 50–70)
PLATELET # BLD AUTO: 198 THOU/UL (ref 140–440)
PMV BLD AUTO: 9.9 FL (ref 8.5–12.5)
POTASSIUM BLD-SCNC: 4.4 MMOL/L (ref 3.5–5)
PROT SERPL-MCNC: 7.3 G/DL (ref 6–8)
RBC # BLD AUTO: 3.45 MILL/UL (ref 3.8–5.4)
SODIUM SERPL-SCNC: 143 MMOL/L (ref 136–145)
WBC: 4.9 THOU/UL (ref 4–11)

## 2018-04-16 ENCOUNTER — AMBULATORY - HEALTHEAST (OUTPATIENT)
Dept: ONCOLOGY | Facility: CLINIC | Age: 63
End: 2018-04-16

## 2018-04-16 ENCOUNTER — OFFICE VISIT - HEALTHEAST (OUTPATIENT)
Dept: PHYSICAL THERAPY | Facility: REHABILITATION | Age: 63
End: 2018-04-16

## 2018-04-16 DIAGNOSIS — L90.5 SCAR CONDITION AND FIBROSIS OF SKIN: ICD-10-CM

## 2018-04-16 DIAGNOSIS — I97.2 POST-MASTECTOMY LYMPHEDEMA SYNDROME: ICD-10-CM

## 2018-04-16 DIAGNOSIS — G62.0 DRUG-INDUCED POLYNEUROPATHY (H): ICD-10-CM

## 2018-04-16 DIAGNOSIS — S46.002D INJURY OF LEFT ROTATOR CUFF, SUBSEQUENT ENCOUNTER: ICD-10-CM

## 2018-04-16 DIAGNOSIS — M24.512 CONTRACTURE, LEFT SHOULDER: ICD-10-CM

## 2018-04-18 ENCOUNTER — OFFICE VISIT - HEALTHEAST (OUTPATIENT)
Dept: PHYSICAL THERAPY | Facility: REHABILITATION | Age: 63
End: 2018-04-18

## 2018-04-18 DIAGNOSIS — I97.2 POST-MASTECTOMY LYMPHEDEMA SYNDROME: ICD-10-CM

## 2018-04-18 DIAGNOSIS — S46.002D INJURY OF LEFT ROTATOR CUFF, SUBSEQUENT ENCOUNTER: ICD-10-CM

## 2018-04-18 DIAGNOSIS — M24.512 CONTRACTURE, LEFT SHOULDER: ICD-10-CM

## 2018-04-18 DIAGNOSIS — L90.5 SCAR CONDITION AND FIBROSIS OF SKIN: ICD-10-CM

## 2018-04-18 DIAGNOSIS — G62.0 DRUG-INDUCED POLYNEUROPATHY (H): ICD-10-CM

## 2018-04-25 ENCOUNTER — COMMUNICATION - HEALTHEAST (OUTPATIENT)
Dept: ONCOLOGY | Facility: CLINIC | Age: 63
End: 2018-04-25

## 2018-05-02 ENCOUNTER — OFFICE VISIT - HEALTHEAST (OUTPATIENT)
Dept: PHYSICAL THERAPY | Facility: REHABILITATION | Age: 63
End: 2018-05-02

## 2018-05-02 DIAGNOSIS — M24.512 CONTRACTURE, LEFT SHOULDER: ICD-10-CM

## 2018-05-02 DIAGNOSIS — G62.0 DRUG-INDUCED POLYNEUROPATHY (H): ICD-10-CM

## 2018-05-02 DIAGNOSIS — S46.002D INJURY OF LEFT ROTATOR CUFF, SUBSEQUENT ENCOUNTER: ICD-10-CM

## 2018-05-02 DIAGNOSIS — I97.2 POST-MASTECTOMY LYMPHEDEMA SYNDROME: ICD-10-CM

## 2018-05-02 DIAGNOSIS — L90.5 SCAR CONDITION AND FIBROSIS OF SKIN: ICD-10-CM

## 2018-05-04 ENCOUNTER — INFUSION - HEALTHEAST (OUTPATIENT)
Dept: INFUSION THERAPY | Facility: HOSPITAL | Age: 63
End: 2018-05-04

## 2018-05-04 DIAGNOSIS — Z17.0 MALIGNANT NEOPLASM OF UPPER-OUTER QUADRANT OF LEFT BREAST IN FEMALE, ESTROGEN RECEPTOR POSITIVE (H): ICD-10-CM

## 2018-05-04 DIAGNOSIS — C50.412 MALIGNANT NEOPLASM OF UPPER-OUTER QUADRANT OF LEFT BREAST IN FEMALE, ESTROGEN RECEPTOR POSITIVE (H): ICD-10-CM

## 2018-05-08 ENCOUNTER — OFFICE VISIT - HEALTHEAST (OUTPATIENT)
Dept: PHYSICAL THERAPY | Facility: REHABILITATION | Age: 63
End: 2018-05-08

## 2018-05-08 DIAGNOSIS — S46.002D INJURY OF LEFT ROTATOR CUFF, SUBSEQUENT ENCOUNTER: ICD-10-CM

## 2018-05-08 DIAGNOSIS — M24.512 CONTRACTURE, LEFT SHOULDER: ICD-10-CM

## 2018-05-08 DIAGNOSIS — I97.2 POST-MASTECTOMY LYMPHEDEMA SYNDROME: ICD-10-CM

## 2018-05-08 DIAGNOSIS — L90.5 SCAR CONDITION AND FIBROSIS OF SKIN: ICD-10-CM

## 2018-05-08 DIAGNOSIS — G62.0 DRUG-INDUCED POLYNEUROPATHY (H): ICD-10-CM

## 2018-05-10 ENCOUNTER — OFFICE VISIT - HEALTHEAST (OUTPATIENT)
Dept: VASCULAR SURGERY | Facility: CLINIC | Age: 63
End: 2018-05-10

## 2018-05-10 DIAGNOSIS — M24.512 CONTRACTURE, LEFT SHOULDER: ICD-10-CM

## 2018-05-10 DIAGNOSIS — M25.512 ACUTE PAIN OF LEFT SHOULDER: ICD-10-CM

## 2018-05-10 DIAGNOSIS — I97.2 POST-MASTECTOMY LYMPHEDEMA SYNDROME: ICD-10-CM

## 2018-05-10 DIAGNOSIS — L90.5 SCAR CONDITION AND FIBROSIS OF SKIN: ICD-10-CM

## 2018-05-10 DIAGNOSIS — C50.412 MALIGNANT NEOPLASM OF UPPER-OUTER QUADRANT OF LEFT FEMALE BREAST (H): ICD-10-CM

## 2018-05-16 ENCOUNTER — OFFICE VISIT - HEALTHEAST (OUTPATIENT)
Dept: PHYSICAL THERAPY | Facility: REHABILITATION | Age: 63
End: 2018-05-16

## 2018-05-16 DIAGNOSIS — S46.002D INJURY OF LEFT ROTATOR CUFF, SUBSEQUENT ENCOUNTER: ICD-10-CM

## 2018-05-16 DIAGNOSIS — G62.0 DRUG-INDUCED POLYNEUROPATHY (H): ICD-10-CM

## 2018-05-16 DIAGNOSIS — Z09 CHEMOTHERAPY FOLLOW-UP EXAMINATION: ICD-10-CM

## 2018-05-16 DIAGNOSIS — M24.512 CONTRACTURE, LEFT SHOULDER: ICD-10-CM

## 2018-05-16 DIAGNOSIS — I97.2 POST-MASTECTOMY LYMPHEDEMA SYNDROME: ICD-10-CM

## 2018-05-16 DIAGNOSIS — L90.5 SCAR CONDITION AND FIBROSIS OF SKIN: ICD-10-CM

## 2018-05-22 ENCOUNTER — RECORDS - HEALTHEAST (OUTPATIENT)
Dept: ADMINISTRATIVE | Facility: OTHER | Age: 63
End: 2018-05-22

## 2018-05-23 ENCOUNTER — COMMUNICATION - HEALTHEAST (OUTPATIENT)
Dept: ADMINISTRATIVE | Facility: HOSPITAL | Age: 63
End: 2018-05-23

## 2018-05-23 ENCOUNTER — COMMUNICATION - HEALTHEAST (OUTPATIENT)
Dept: ONCOLOGY | Facility: CLINIC | Age: 63
End: 2018-05-23

## 2018-05-25 ENCOUNTER — AMBULATORY - HEALTHEAST (OUTPATIENT)
Dept: ONCOLOGY | Facility: HOSPITAL | Age: 63
End: 2018-05-25

## 2018-05-25 ENCOUNTER — AMBULATORY - HEALTHEAST (OUTPATIENT)
Dept: INFUSION THERAPY | Facility: HOSPITAL | Age: 63
End: 2018-05-25

## 2018-05-25 ENCOUNTER — INFUSION - HEALTHEAST (OUTPATIENT)
Dept: INFUSION THERAPY | Facility: HOSPITAL | Age: 63
End: 2018-05-25

## 2018-05-25 ENCOUNTER — OFFICE VISIT - HEALTHEAST (OUTPATIENT)
Dept: ONCOLOGY | Facility: HOSPITAL | Age: 63
End: 2018-05-25

## 2018-05-25 DIAGNOSIS — Z17.0 MALIGNANT NEOPLASM OF UPPER-OUTER QUADRANT OF LEFT BREAST IN FEMALE, ESTROGEN RECEPTOR POSITIVE (H): ICD-10-CM

## 2018-05-25 DIAGNOSIS — C50.412 MALIGNANT NEOPLASM OF UPPER-OUTER QUADRANT OF LEFT BREAST IN FEMALE, ESTROGEN RECEPTOR POSITIVE (H): ICD-10-CM

## 2018-05-25 DIAGNOSIS — R52 PAIN: ICD-10-CM

## 2018-05-25 LAB
ALBUMIN SERPL-MCNC: 3.6 G/DL (ref 3.5–5)
ALP SERPL-CCNC: 83 U/L (ref 45–120)
ALT SERPL W P-5'-P-CCNC: 13 U/L (ref 0–45)
ANION GAP SERPL CALCULATED.3IONS-SCNC: 9 MMOL/L (ref 5–18)
AST SERPL W P-5'-P-CCNC: 14 U/L (ref 0–40)
BASOPHILS # BLD AUTO: 0 THOU/UL (ref 0–0.2)
BASOPHILS NFR BLD AUTO: 0 % (ref 0–2)
BILIRUB SERPL-MCNC: 0.3 MG/DL (ref 0–1)
BUN SERPL-MCNC: 18 MG/DL (ref 8–22)
CALCIUM SERPL-MCNC: 9.6 MG/DL (ref 8.5–10.5)
CHLORIDE BLD-SCNC: 107 MMOL/L (ref 98–107)
CO2 SERPL-SCNC: 27 MMOL/L (ref 22–31)
CREAT SERPL-MCNC: 0.76 MG/DL (ref 0.6–1.1)
EOSINOPHIL # BLD AUTO: 0.1 THOU/UL (ref 0–0.4)
EOSINOPHIL NFR BLD AUTO: 2 % (ref 0–6)
ERYTHROCYTE [DISTWIDTH] IN BLOOD BY AUTOMATED COUNT: 12.7 % (ref 11–14.5)
GFR SERPL CREATININE-BSD FRML MDRD: >60 ML/MIN/1.73M2
GLUCOSE BLD-MCNC: 108 MG/DL (ref 70–125)
HCT VFR BLD AUTO: 32.8 % (ref 35–47)
HGB BLD-MCNC: 11 G/DL (ref 12–16)
LYMPHOCYTES # BLD AUTO: 1.1 THOU/UL (ref 0.8–4.4)
LYMPHOCYTES NFR BLD AUTO: 18 % (ref 20–40)
MAGNESIUM SERPL-MCNC: 2 MG/DL (ref 1.8–2.6)
MCH RBC QN AUTO: 30.1 PG (ref 27–34)
MCHC RBC AUTO-ENTMCNC: 33.5 G/DL (ref 32–36)
MCV RBC AUTO: 90 FL (ref 80–100)
MONOCYTES # BLD AUTO: 0.4 THOU/UL (ref 0–0.9)
MONOCYTES NFR BLD AUTO: 7 % (ref 2–10)
NEUTROPHILS # BLD AUTO: 4.1 THOU/UL (ref 2–7.7)
NEUTROPHILS NFR BLD AUTO: 73 % (ref 50–70)
PLATELET # BLD AUTO: 194 THOU/UL (ref 140–440)
PMV BLD AUTO: 10.1 FL (ref 8.5–12.5)
POTASSIUM BLD-SCNC: 4.2 MMOL/L (ref 3.5–5)
PROT SERPL-MCNC: 7.2 G/DL (ref 6–8)
RBC # BLD AUTO: 3.66 MILL/UL (ref 3.8–5.4)
SODIUM SERPL-SCNC: 143 MMOL/L (ref 136–145)
WBC: 5.7 THOU/UL (ref 4–11)

## 2018-06-04 ENCOUNTER — COMMUNICATION - HEALTHEAST (OUTPATIENT)
Dept: ONCOLOGY | Facility: HOSPITAL | Age: 63
End: 2018-06-04

## 2018-06-04 ASSESSMENT — MIFFLIN-ST. JEOR: SCORE: 1201.33

## 2018-06-06 ENCOUNTER — SURGERY - HEALTHEAST (OUTPATIENT)
Dept: SURGERY | Facility: AMBULATORY SURGERY CENTER | Age: 63
End: 2018-06-06

## 2018-06-06 ENCOUNTER — COMMUNICATION - HEALTHEAST (OUTPATIENT)
Dept: ONCOLOGY | Facility: CLINIC | Age: 63
End: 2018-06-06

## 2018-06-12 ENCOUNTER — HOSPITAL ENCOUNTER (OUTPATIENT)
Dept: PALLIATIVE MEDICINE | Facility: OTHER | Age: 63
Discharge: HOME | End: 2018-06-12
Attending: INTERNAL MEDICINE

## 2018-06-12 DIAGNOSIS — G89.29 CHRONIC LEFT SHOULDER PAIN: ICD-10-CM

## 2018-06-12 DIAGNOSIS — G89.4 CHRONIC PAIN SYNDROME: ICD-10-CM

## 2018-06-12 DIAGNOSIS — T45.1X5A CHEMOTHERAPY-INDUCED PERIPHERAL NEUROPATHY (H): ICD-10-CM

## 2018-06-12 DIAGNOSIS — M25.512 CHRONIC LEFT SHOULDER PAIN: ICD-10-CM

## 2018-06-12 DIAGNOSIS — G62.0 CHEMOTHERAPY-INDUCED PERIPHERAL NEUROPATHY (H): ICD-10-CM

## 2018-06-13 ENCOUNTER — AMBULATORY - HEALTHEAST (OUTPATIENT)
Dept: ONCOLOGY | Facility: CLINIC | Age: 63
End: 2018-06-13

## 2018-06-13 ENCOUNTER — COMMUNICATION - HEALTHEAST (OUTPATIENT)
Dept: ONCOLOGY | Facility: CLINIC | Age: 63
End: 2018-06-13

## 2018-06-13 DIAGNOSIS — Z17.0 MALIGNANT NEOPLASM OF UPPER-OUTER QUADRANT OF LEFT BREAST IN FEMALE, ESTROGEN RECEPTOR POSITIVE (H): ICD-10-CM

## 2018-06-13 DIAGNOSIS — C50.412 MALIGNANT NEOPLASM OF UPPER-OUTER QUADRANT OF LEFT BREAST IN FEMALE, ESTROGEN RECEPTOR POSITIVE (H): ICD-10-CM

## 2018-06-20 ENCOUNTER — HOSPITAL ENCOUNTER (OUTPATIENT)
Dept: PALLIATIVE MEDICINE | Facility: OTHER | Age: 63
Discharge: HOME | End: 2018-06-20

## 2018-06-20 ENCOUNTER — COMMUNICATION - HEALTHEAST (OUTPATIENT)
Dept: ONCOLOGY | Facility: CLINIC | Age: 63
End: 2018-06-20

## 2018-06-20 DIAGNOSIS — M25.512 CHRONIC LEFT SHOULDER PAIN: ICD-10-CM

## 2018-06-20 DIAGNOSIS — T45.1X5A CHEMOTHERAPY-INDUCED PERIPHERAL NEUROPATHY (H): ICD-10-CM

## 2018-06-20 DIAGNOSIS — G89.4 CHRONIC PAIN SYNDROME: ICD-10-CM

## 2018-06-20 DIAGNOSIS — G62.0 CHEMOTHERAPY-INDUCED PERIPHERAL NEUROPATHY (H): ICD-10-CM

## 2018-06-20 DIAGNOSIS — G89.29 CHRONIC LEFT SHOULDER PAIN: ICD-10-CM

## 2018-06-21 ENCOUNTER — RECORDS - HEALTHEAST (OUTPATIENT)
Dept: ADMINISTRATIVE | Facility: OTHER | Age: 63
End: 2018-06-21

## 2018-06-22 ENCOUNTER — HOSPITAL ENCOUNTER (OUTPATIENT)
Dept: MAMMOGRAPHY | Facility: CLINIC | Age: 63
Discharge: HOME | End: 2018-06-22
Attending: INTERNAL MEDICINE

## 2018-06-22 DIAGNOSIS — C50.412 MALIGNANT NEOPLASM OF UPPER-OUTER QUADRANT OF LEFT BREAST IN FEMALE, ESTROGEN RECEPTOR POSITIVE (H): ICD-10-CM

## 2018-06-22 DIAGNOSIS — Z17.0 MALIGNANT NEOPLASM OF UPPER-OUTER QUADRANT OF LEFT BREAST IN FEMALE, ESTROGEN RECEPTOR POSITIVE (H): ICD-10-CM

## 2018-06-28 ENCOUNTER — RECORDS - HEALTHEAST (OUTPATIENT)
Dept: ADMINISTRATIVE | Facility: OTHER | Age: 63
End: 2018-06-28

## 2018-08-07 ENCOUNTER — COMMUNICATION - HEALTHEAST (OUTPATIENT)
Dept: RADIATION ONCOLOGY | Facility: HOSPITAL | Age: 63
End: 2018-08-07

## 2018-08-31 ENCOUNTER — COMMUNICATION - HEALTHEAST (OUTPATIENT)
Dept: ONCOLOGY | Facility: HOSPITAL | Age: 63
End: 2018-08-31

## 2018-09-13 ENCOUNTER — COMMUNICATION - HEALTHEAST (OUTPATIENT)
Dept: ONCOLOGY | Facility: HOSPITAL | Age: 63
End: 2018-09-13

## 2018-09-13 ENCOUNTER — COMMUNICATION - HEALTHEAST (OUTPATIENT)
Dept: VASCULAR SURGERY | Facility: CLINIC | Age: 63
End: 2018-09-13

## 2018-09-14 ENCOUNTER — COMMUNICATION - HEALTHEAST (OUTPATIENT)
Dept: ONCOLOGY | Facility: HOSPITAL | Age: 63
End: 2018-09-14

## 2018-09-21 ENCOUNTER — OFFICE VISIT - HEALTHEAST (OUTPATIENT)
Dept: RADIATION ONCOLOGY | Facility: HOSPITAL | Age: 63
End: 2018-09-21

## 2018-09-21 DIAGNOSIS — C50.412 MALIGNANT NEOPLASM OF UPPER-OUTER QUADRANT OF LEFT BREAST IN FEMALE, ESTROGEN RECEPTOR POSITIVE (H): ICD-10-CM

## 2018-09-21 DIAGNOSIS — Z17.0 MALIGNANT NEOPLASM OF UPPER-OUTER QUADRANT OF LEFT BREAST IN FEMALE, ESTROGEN RECEPTOR POSITIVE (H): ICD-10-CM

## 2018-09-26 ENCOUNTER — RECORDS - HEALTHEAST (OUTPATIENT)
Dept: RADIOLOGY | Facility: CLINIC | Age: 63
End: 2018-09-26

## 2018-09-27 ENCOUNTER — OFFICE VISIT - HEALTHEAST (OUTPATIENT)
Dept: VASCULAR SURGERY | Facility: CLINIC | Age: 63
End: 2018-09-27

## 2018-09-27 DIAGNOSIS — S46.002A INJURY OF LEFT ROTATOR CUFF: ICD-10-CM

## 2018-09-27 DIAGNOSIS — L90.5 SCAR CONDITION AND FIBROSIS OF SKIN: ICD-10-CM

## 2018-09-27 DIAGNOSIS — I97.2 POST-MASTECTOMY LYMPHEDEMA SYNDROME: ICD-10-CM

## 2018-09-27 DIAGNOSIS — M24.512 CONTRACTURE, LEFT SHOULDER: ICD-10-CM

## 2018-09-27 DIAGNOSIS — C50.412 MALIGNANT NEOPLASM OF UPPER-OUTER QUADRANT OF LEFT FEMALE BREAST (H): ICD-10-CM

## 2018-09-27 ASSESSMENT — MIFFLIN-ST. JEOR: SCORE: 1210.4

## 2018-09-28 ENCOUNTER — AMBULATORY - HEALTHEAST (OUTPATIENT)
Dept: INFUSION THERAPY | Facility: HOSPITAL | Age: 63
End: 2018-09-28

## 2018-09-28 ENCOUNTER — OFFICE VISIT - HEALTHEAST (OUTPATIENT)
Dept: ONCOLOGY | Facility: HOSPITAL | Age: 63
End: 2018-09-28

## 2018-09-28 DIAGNOSIS — D22.9 NEVUS: ICD-10-CM

## 2018-09-28 DIAGNOSIS — Z17.0 MALIGNANT NEOPLASM OF UPPER-OUTER QUADRANT OF LEFT BREAST IN FEMALE, ESTROGEN RECEPTOR POSITIVE (H): ICD-10-CM

## 2018-09-28 DIAGNOSIS — M85.9 LOW BONE DENSITY: ICD-10-CM

## 2018-09-28 DIAGNOSIS — C50.412 MALIGNANT NEOPLASM OF UPPER-OUTER QUADRANT OF LEFT BREAST IN FEMALE, ESTROGEN RECEPTOR POSITIVE (H): ICD-10-CM

## 2018-09-28 DIAGNOSIS — Z79.811 AROMATASE INHIBITOR USE: ICD-10-CM

## 2018-10-02 ENCOUNTER — COMMUNICATION - HEALTHEAST (OUTPATIENT)
Dept: ONCOLOGY | Facility: CLINIC | Age: 63
End: 2018-10-02

## 2018-10-02 ENCOUNTER — HOSPITAL ENCOUNTER (OUTPATIENT)
Dept: SURGERY | Facility: CLINIC | Age: 63
Discharge: HOME | End: 2018-10-02
Attending: SPECIALIST

## 2018-10-02 DIAGNOSIS — Z85.3 PERSONAL HISTORY OF BREAST CANCER: ICD-10-CM

## 2018-10-10 ENCOUNTER — COMMUNICATION - HEALTHEAST (OUTPATIENT)
Dept: ONCOLOGY | Facility: CLINIC | Age: 63
End: 2018-10-10

## 2018-10-15 ENCOUNTER — COMMUNICATION - HEALTHEAST (OUTPATIENT)
Dept: ONCOLOGY | Facility: HOSPITAL | Age: 63
End: 2018-10-15

## 2018-10-15 DIAGNOSIS — Z17.0 MALIGNANT NEOPLASM OF UPPER-OUTER QUADRANT OF LEFT BREAST IN FEMALE, ESTROGEN RECEPTOR POSITIVE (H): ICD-10-CM

## 2018-10-15 DIAGNOSIS — C50.412 MALIGNANT NEOPLASM OF UPPER-OUTER QUADRANT OF LEFT BREAST IN FEMALE, ESTROGEN RECEPTOR POSITIVE (H): ICD-10-CM

## 2018-10-24 ENCOUNTER — COMMUNICATION - HEALTHEAST (OUTPATIENT)
Dept: ONCOLOGY | Facility: CLINIC | Age: 63
End: 2018-10-24

## 2018-10-24 DIAGNOSIS — Z17.0 MALIGNANT NEOPLASM OF UPPER-OUTER QUADRANT OF LEFT BREAST IN FEMALE, ESTROGEN RECEPTOR POSITIVE (H): ICD-10-CM

## 2018-10-24 DIAGNOSIS — C50.412 MALIGNANT NEOPLASM OF UPPER-OUTER QUADRANT OF LEFT BREAST IN FEMALE, ESTROGEN RECEPTOR POSITIVE (H): ICD-10-CM

## 2019-01-31 ENCOUNTER — OFFICE VISIT - HEALTHEAST (OUTPATIENT)
Dept: VASCULAR SURGERY | Facility: CLINIC | Age: 64
End: 2019-01-31

## 2019-01-31 ENCOUNTER — AMBULATORY - HEALTHEAST (OUTPATIENT)
Dept: OTHER | Facility: CLINIC | Age: 64
End: 2019-01-31

## 2019-01-31 DIAGNOSIS — I97.2 POST-MASTECTOMY LYMPHEDEMA SYNDROME: ICD-10-CM

## 2019-01-31 DIAGNOSIS — C50.412 MALIGNANT NEOPLASM OF UPPER-OUTER QUADRANT OF LEFT FEMALE BREAST, UNSPECIFIED ESTROGEN RECEPTOR STATUS (H): ICD-10-CM

## 2019-01-31 DIAGNOSIS — S46.002D INJURY OF LEFT ROTATOR CUFF, SUBSEQUENT ENCOUNTER: ICD-10-CM

## 2019-01-31 DIAGNOSIS — L90.5 SCAR CONDITION AND FIBROSIS OF SKIN: ICD-10-CM

## 2019-01-31 DIAGNOSIS — M24.512 CONTRACTURE, LEFT SHOULDER: ICD-10-CM

## 2019-01-31 RX ORDER — TRAZODONE HYDROCHLORIDE 50 MG/1
50 TABLET, FILM COATED ORAL
Status: SHIPPED | COMMUNITY
Start: 2019-01-16

## 2019-01-31 ASSESSMENT — MIFFLIN-ST. JEOR: SCORE: 1216.75

## 2019-02-05 ENCOUNTER — AMBULATORY - HEALTHEAST (OUTPATIENT)
Dept: OTHER | Facility: CLINIC | Age: 64
End: 2019-02-05

## 2019-03-29 ENCOUNTER — OFFICE VISIT - HEALTHEAST (OUTPATIENT)
Dept: ONCOLOGY | Facility: HOSPITAL | Age: 64
End: 2019-03-29

## 2019-03-29 DIAGNOSIS — M81.8 OTHER OSTEOPOROSIS WITHOUT CURRENT PATHOLOGICAL FRACTURE: ICD-10-CM

## 2019-03-29 DIAGNOSIS — M25.562 ARTHRALGIA OF BOTH KNEES: ICD-10-CM

## 2019-03-29 DIAGNOSIS — Z17.0 MALIGNANT NEOPLASM OF UPPER-OUTER QUADRANT OF LEFT BREAST IN FEMALE, ESTROGEN RECEPTOR POSITIVE (H): ICD-10-CM

## 2019-03-29 DIAGNOSIS — M25.561 ARTHRALGIA OF BOTH KNEES: ICD-10-CM

## 2019-03-29 DIAGNOSIS — C50.412 MALIGNANT NEOPLASM OF UPPER-OUTER QUADRANT OF LEFT BREAST IN FEMALE, ESTROGEN RECEPTOR POSITIVE (H): ICD-10-CM

## 2019-04-01 ENCOUNTER — INFUSION - HEALTHEAST (OUTPATIENT)
Dept: INFUSION THERAPY | Facility: HOSPITAL | Age: 64
End: 2019-04-01

## 2019-04-01 ENCOUNTER — COMMUNICATION - HEALTHEAST (OUTPATIENT)
Dept: ONCOLOGY | Facility: HOSPITAL | Age: 64
End: 2019-04-01

## 2019-04-01 DIAGNOSIS — Z17.0 MALIGNANT NEOPLASM OF UPPER-OUTER QUADRANT OF LEFT BREAST IN FEMALE, ESTROGEN RECEPTOR POSITIVE (H): ICD-10-CM

## 2019-04-01 DIAGNOSIS — M81.8 OTHER OSTEOPOROSIS WITHOUT CURRENT PATHOLOGICAL FRACTURE: ICD-10-CM

## 2019-04-01 DIAGNOSIS — Z85.3 HISTORY OF BREAST CANCER: ICD-10-CM

## 2019-04-01 DIAGNOSIS — Z79.811 AROMATASE INHIBITOR USE: ICD-10-CM

## 2019-04-01 DIAGNOSIS — C50.412 MALIGNANT NEOPLASM OF UPPER-OUTER QUADRANT OF LEFT BREAST IN FEMALE, ESTROGEN RECEPTOR POSITIVE (H): ICD-10-CM

## 2019-04-01 LAB
ALBUMIN SERPL-MCNC: 4 G/DL (ref 3.5–5)
ALP SERPL-CCNC: 96 U/L (ref 45–120)
ALT SERPL W P-5'-P-CCNC: 15 U/L (ref 0–45)
ANION GAP SERPL CALCULATED.3IONS-SCNC: 9 MMOL/L (ref 5–18)
AST SERPL W P-5'-P-CCNC: 15 U/L (ref 0–40)
BILIRUB SERPL-MCNC: 0.3 MG/DL (ref 0–1)
BUN SERPL-MCNC: 13 MG/DL (ref 8–22)
CALCIUM SERPL-MCNC: 10 MG/DL (ref 8.5–10.5)
CHLORIDE BLD-SCNC: 109 MMOL/L (ref 98–107)
CO2 SERPL-SCNC: 25 MMOL/L (ref 22–31)
CREAT SERPL-MCNC: 0.77 MG/DL (ref 0.6–1.1)
GFR SERPL CREATININE-BSD FRML MDRD: >60 ML/MIN/1.73M2
GLUCOSE BLD-MCNC: 133 MG/DL (ref 70–125)
POTASSIUM BLD-SCNC: 4 MMOL/L (ref 3.5–5)
PROT SERPL-MCNC: 7.4 G/DL (ref 6–8)
SODIUM SERPL-SCNC: 143 MMOL/L (ref 136–145)

## 2019-04-02 ENCOUNTER — COMMUNICATION - HEALTHEAST (OUTPATIENT)
Dept: ONCOLOGY | Facility: HOSPITAL | Age: 64
End: 2019-04-02

## 2019-07-18 ENCOUNTER — OFFICE VISIT - HEALTHEAST (OUTPATIENT)
Dept: VASCULAR SURGERY | Facility: CLINIC | Age: 64
End: 2019-07-18

## 2019-07-18 DIAGNOSIS — S46.002D INJURY OF LEFT ROTATOR CUFF, SUBSEQUENT ENCOUNTER: ICD-10-CM

## 2019-07-18 DIAGNOSIS — C50.412 MALIGNANT NEOPLASM OF UPPER-OUTER QUADRANT OF LEFT FEMALE BREAST, UNSPECIFIED ESTROGEN RECEPTOR STATUS (H): ICD-10-CM

## 2019-07-18 DIAGNOSIS — L90.5 SCAR CONDITION AND FIBROSIS OF SKIN: ICD-10-CM

## 2019-07-18 DIAGNOSIS — I97.2 POST-MASTECTOMY LYMPHEDEMA SYNDROME: ICD-10-CM

## 2019-07-18 DIAGNOSIS — M24.512 CONTRACTURE, LEFT SHOULDER: ICD-10-CM

## 2019-07-18 ASSESSMENT — MIFFLIN-ST. JEOR: SCORE: 1199.06

## 2019-07-23 ENCOUNTER — AMBULATORY - HEALTHEAST (OUTPATIENT)
Dept: OTHER | Facility: CLINIC | Age: 64
End: 2019-07-23

## 2019-07-24 ENCOUNTER — AMBULATORY - HEALTHEAST (OUTPATIENT)
Dept: VASCULAR SURGERY | Facility: CLINIC | Age: 64
End: 2019-07-24

## 2019-07-24 DIAGNOSIS — S46.002D INJURY OF LEFT ROTATOR CUFF, SUBSEQUENT ENCOUNTER: ICD-10-CM

## 2019-07-24 DIAGNOSIS — L03.114 LEFT ARM CELLULITIS: ICD-10-CM

## 2019-07-24 DIAGNOSIS — L90.5 SCAR CONDITION AND FIBROSIS OF SKIN: ICD-10-CM

## 2019-07-24 DIAGNOSIS — S46.002A INJURY OF LEFT ROTATOR CUFF: ICD-10-CM

## 2019-07-24 DIAGNOSIS — M24.512 CONTRACTURE, LEFT SHOULDER: ICD-10-CM

## 2019-07-24 DIAGNOSIS — C50.412 MALIGNANT NEOPLASM OF UPPER-OUTER QUADRANT OF LEFT FEMALE BREAST, UNSPECIFIED ESTROGEN RECEPTOR STATUS (H): ICD-10-CM

## 2019-07-24 DIAGNOSIS — I97.2 POST-MASTECTOMY LYMPHEDEMA SYNDROME: ICD-10-CM

## 2019-07-25 ENCOUNTER — COMMUNICATION - HEALTHEAST (OUTPATIENT)
Dept: OTHER | Facility: CLINIC | Age: 64
End: 2019-07-25

## 2019-07-26 ENCOUNTER — AMBULATORY - HEALTHEAST (OUTPATIENT)
Dept: INFUSION THERAPY | Facility: HOSPITAL | Age: 64
End: 2019-07-26

## 2019-07-26 ENCOUNTER — OFFICE VISIT - HEALTHEAST (OUTPATIENT)
Dept: ONCOLOGY | Facility: HOSPITAL | Age: 64
End: 2019-07-26

## 2019-07-26 DIAGNOSIS — C50.412 MALIGNANT NEOPLASM OF UPPER-OUTER QUADRANT OF LEFT BREAST IN FEMALE, ESTROGEN RECEPTOR POSITIVE (H): ICD-10-CM

## 2019-07-26 DIAGNOSIS — Z17.0 MALIGNANT NEOPLASM OF UPPER-OUTER QUADRANT OF LEFT BREAST IN FEMALE, ESTROGEN RECEPTOR POSITIVE (H): ICD-10-CM

## 2019-07-26 DIAGNOSIS — E55.9 VITAMIN D DEFICIENCY: ICD-10-CM

## 2019-07-26 DIAGNOSIS — Z12.31 ENCOUNTER FOR SCREENING MAMMOGRAM FOR BREAST CANCER: ICD-10-CM

## 2019-07-26 DIAGNOSIS — Z85.3 HISTORY OF BREAST CANCER: ICD-10-CM

## 2019-07-26 DIAGNOSIS — M81.0 AGE-RELATED OSTEOPOROSIS WITHOUT CURRENT PATHOLOGICAL FRACTURE: ICD-10-CM

## 2019-07-26 LAB
25(OH)D3 SERPL-MCNC: 18.5 NG/ML (ref 30–80)
ALBUMIN SERPL-MCNC: 4.2 G/DL (ref 3.5–5)
ALP SERPL-CCNC: 90 U/L (ref 45–120)
ALT SERPL W P-5'-P-CCNC: 12 U/L (ref 0–45)
ANION GAP SERPL CALCULATED.3IONS-SCNC: 8 MMOL/L (ref 5–18)
AST SERPL W P-5'-P-CCNC: 15 U/L (ref 0–40)
BASOPHILS # BLD AUTO: 0 THOU/UL (ref 0–0.2)
BASOPHILS NFR BLD AUTO: 0 % (ref 0–2)
BILIRUB SERPL-MCNC: 0.3 MG/DL (ref 0–1)
BUN SERPL-MCNC: 10 MG/DL (ref 8–22)
CALCIUM SERPL-MCNC: 10.3 MG/DL (ref 8.5–10.5)
CHLORIDE BLD-SCNC: 108 MMOL/L (ref 98–107)
CO2 SERPL-SCNC: 25 MMOL/L (ref 22–31)
CREAT SERPL-MCNC: 0.81 MG/DL (ref 0.6–1.1)
EOSINOPHIL # BLD AUTO: 0 THOU/UL (ref 0–0.4)
EOSINOPHIL NFR BLD AUTO: 1 % (ref 0–6)
ERYTHROCYTE [DISTWIDTH] IN BLOOD BY AUTOMATED COUNT: 13.1 % (ref 11–14.5)
GFR SERPL CREATININE-BSD FRML MDRD: >60 ML/MIN/1.73M2
GLUCOSE BLD-MCNC: 98 MG/DL (ref 70–125)
HCT VFR BLD AUTO: 41.2 % (ref 35–47)
HGB BLD-MCNC: 14 G/DL (ref 12–16)
LYMPHOCYTES # BLD AUTO: 1.4 THOU/UL (ref 0.8–4.4)
LYMPHOCYTES NFR BLD AUTO: 25 % (ref 20–40)
MCH RBC QN AUTO: 29.5 PG (ref 27–34)
MCHC RBC AUTO-ENTMCNC: 34 G/DL (ref 32–36)
MCV RBC AUTO: 87 FL (ref 80–100)
MONOCYTES # BLD AUTO: 0.3 THOU/UL (ref 0–0.9)
MONOCYTES NFR BLD AUTO: 6 % (ref 2–10)
NEUTROPHILS # BLD AUTO: 3.6 THOU/UL (ref 2–7.7)
NEUTROPHILS NFR BLD AUTO: 67 % (ref 50–70)
PLATELET # BLD AUTO: 227 THOU/UL (ref 140–440)
PMV BLD AUTO: 11.5 FL (ref 8.5–12.5)
POTASSIUM BLD-SCNC: 4.3 MMOL/L (ref 3.5–5)
PROT SERPL-MCNC: 8.1 G/DL (ref 6–8)
RBC # BLD AUTO: 4.74 MILL/UL (ref 3.8–5.4)
SODIUM SERPL-SCNC: 141 MMOL/L (ref 136–145)
WBC: 5.4 THOU/UL (ref 4–11)

## 2019-07-26 RX ORDER — HYDROXYZINE PAMOATE 25 MG/1
25 CAPSULE ORAL PRN
Status: SHIPPED | COMMUNITY
Start: 2019-07-22

## 2019-07-29 ENCOUNTER — COMMUNICATION - HEALTHEAST (OUTPATIENT)
Dept: ONCOLOGY | Facility: CLINIC | Age: 64
End: 2019-07-29

## 2019-07-30 ENCOUNTER — HOSPITAL ENCOUNTER (OUTPATIENT)
Dept: MAMMOGRAPHY | Facility: CLINIC | Age: 64
Discharge: HOME | End: 2019-07-30
Attending: SPECIALIST

## 2019-07-30 ENCOUNTER — HOSPITAL ENCOUNTER (OUTPATIENT)
Dept: SURGERY | Facility: CLINIC | Age: 64
Discharge: HOME | End: 2019-07-30
Attending: SPECIALIST

## 2019-07-30 DIAGNOSIS — N64.4 BREAST PAIN: ICD-10-CM

## 2019-07-30 DIAGNOSIS — Z85.3 PERSONAL HISTORY OF BREAST CANCER: ICD-10-CM

## 2019-07-30 ASSESSMENT — MIFFLIN-ST. JEOR: SCORE: 1201.33

## 2019-08-02 ENCOUNTER — COMMUNICATION - HEALTHEAST (OUTPATIENT)
Dept: OTHER | Facility: CLINIC | Age: 64
End: 2019-08-02

## 2019-08-08 ENCOUNTER — AMBULATORY - HEALTHEAST (OUTPATIENT)
Dept: OTHER | Facility: CLINIC | Age: 64
End: 2019-08-08

## 2019-08-12 ENCOUNTER — AMBULATORY - HEALTHEAST (OUTPATIENT)
Dept: OTHER | Facility: CLINIC | Age: 64
End: 2019-08-12

## 2019-08-15 ENCOUNTER — COMMUNICATION - HEALTHEAST (OUTPATIENT)
Dept: ONCOLOGY | Facility: HOSPITAL | Age: 64
End: 2019-08-15

## 2019-08-15 DIAGNOSIS — M81.0 AGE-RELATED OSTEOPOROSIS WITHOUT CURRENT PATHOLOGICAL FRACTURE: ICD-10-CM

## 2019-08-16 ENCOUNTER — COMMUNICATION - HEALTHEAST (OUTPATIENT)
Dept: OTHER | Facility: CLINIC | Age: 64
End: 2019-08-16

## 2019-08-20 ENCOUNTER — AMBULATORY - HEALTHEAST (OUTPATIENT)
Dept: OTHER | Facility: CLINIC | Age: 64
End: 2019-08-20

## 2019-08-21 ENCOUNTER — AMBULATORY - HEALTHEAST (OUTPATIENT)
Dept: VASCULAR SURGERY | Facility: CLINIC | Age: 64
End: 2019-08-21

## 2019-08-21 DIAGNOSIS — C50.412 MALIGNANT NEOPLASM OF UPPER-OUTER QUADRANT OF LEFT FEMALE BREAST, UNSPECIFIED ESTROGEN RECEPTOR STATUS (H): ICD-10-CM

## 2019-08-21 DIAGNOSIS — L90.5 SCAR CONDITION AND FIBROSIS OF SKIN: ICD-10-CM

## 2019-08-21 DIAGNOSIS — M24.512 CONTRACTURE, LEFT SHOULDER: ICD-10-CM

## 2019-08-21 DIAGNOSIS — I97.2 POST-MASTECTOMY LYMPHEDEMA SYNDROME: ICD-10-CM

## 2019-08-27 ENCOUNTER — AMBULATORY - HEALTHEAST (OUTPATIENT)
Dept: OTHER | Facility: CLINIC | Age: 64
End: 2019-08-27

## 2019-09-29 ENCOUNTER — COMMUNICATION - HEALTHEAST (OUTPATIENT)
Dept: ONCOLOGY | Facility: HOSPITAL | Age: 64
End: 2019-09-29

## 2019-09-29 DIAGNOSIS — C50.412 MALIGNANT NEOPLASM OF UPPER-OUTER QUADRANT OF LEFT BREAST IN FEMALE, ESTROGEN RECEPTOR POSITIVE (H): ICD-10-CM

## 2019-09-29 DIAGNOSIS — M81.0 AGE-RELATED OSTEOPOROSIS WITHOUT CURRENT PATHOLOGICAL FRACTURE: ICD-10-CM

## 2019-09-29 DIAGNOSIS — Z17.0 MALIGNANT NEOPLASM OF UPPER-OUTER QUADRANT OF LEFT BREAST IN FEMALE, ESTROGEN RECEPTOR POSITIVE (H): ICD-10-CM

## 2019-10-04 ENCOUNTER — AMBULATORY - HEALTHEAST (OUTPATIENT)
Dept: INFUSION THERAPY | Facility: HOSPITAL | Age: 64
End: 2019-10-04

## 2019-10-04 ENCOUNTER — OFFICE VISIT - HEALTHEAST (OUTPATIENT)
Dept: ONCOLOGY | Facility: HOSPITAL | Age: 64
End: 2019-10-04

## 2019-10-04 DIAGNOSIS — C50.412 MALIGNANT NEOPLASM OF UPPER-OUTER QUADRANT OF LEFT BREAST IN FEMALE, ESTROGEN RECEPTOR POSITIVE (H): ICD-10-CM

## 2019-10-04 DIAGNOSIS — Z79.811 AROMATASE INHIBITOR USE: ICD-10-CM

## 2019-10-04 DIAGNOSIS — Z17.0 MALIGNANT NEOPLASM OF UPPER-OUTER QUADRANT OF LEFT BREAST IN FEMALE, ESTROGEN RECEPTOR POSITIVE (H): ICD-10-CM

## 2019-10-04 DIAGNOSIS — M81.0 AGE-RELATED OSTEOPOROSIS WITHOUT CURRENT PATHOLOGICAL FRACTURE: ICD-10-CM

## 2019-10-04 DIAGNOSIS — E55.9 VITAMIN D DEFICIENCY: ICD-10-CM

## 2019-10-16 ENCOUNTER — COMMUNICATION - HEALTHEAST (OUTPATIENT)
Dept: ONCOLOGY | Facility: HOSPITAL | Age: 64
End: 2019-10-16

## 2019-10-16 DIAGNOSIS — C50.412 MALIGNANT NEOPLASM OF UPPER-OUTER QUADRANT OF LEFT BREAST IN FEMALE, ESTROGEN RECEPTOR POSITIVE (H): ICD-10-CM

## 2019-10-16 DIAGNOSIS — Z17.0 MALIGNANT NEOPLASM OF UPPER-OUTER QUADRANT OF LEFT BREAST IN FEMALE, ESTROGEN RECEPTOR POSITIVE (H): ICD-10-CM

## 2019-10-28 ENCOUNTER — COMMUNICATION - HEALTHEAST (OUTPATIENT)
Dept: ONCOLOGY | Facility: HOSPITAL | Age: 64
End: 2019-10-28

## 2019-10-28 DIAGNOSIS — C50.412 MALIGNANT NEOPLASM OF UPPER-OUTER QUADRANT OF LEFT BREAST IN FEMALE, ESTROGEN RECEPTOR POSITIVE (H): ICD-10-CM

## 2019-10-28 DIAGNOSIS — Z17.0 MALIGNANT NEOPLASM OF UPPER-OUTER QUADRANT OF LEFT BREAST IN FEMALE, ESTROGEN RECEPTOR POSITIVE (H): ICD-10-CM

## 2019-11-06 ENCOUNTER — COMMUNICATION - HEALTHEAST (OUTPATIENT)
Dept: ONCOLOGY | Facility: HOSPITAL | Age: 64
End: 2019-11-06

## 2019-11-06 ENCOUNTER — COMMUNICATION - HEALTHEAST (OUTPATIENT)
Dept: OTHER | Facility: CLINIC | Age: 64
End: 2019-11-06

## 2019-11-06 DIAGNOSIS — M81.0 AGE-RELATED OSTEOPOROSIS WITHOUT CURRENT PATHOLOGICAL FRACTURE: ICD-10-CM

## 2019-11-12 ENCOUNTER — AMBULATORY - HEALTHEAST (OUTPATIENT)
Dept: OTHER | Facility: CLINIC | Age: 64
End: 2019-11-12

## 2019-11-13 ENCOUNTER — AMBULATORY - HEALTHEAST (OUTPATIENT)
Dept: VASCULAR SURGERY | Facility: CLINIC | Age: 64
End: 2019-11-13

## 2019-11-13 DIAGNOSIS — I97.2 POST-MASTECTOMY LYMPHEDEMA SYNDROME: ICD-10-CM

## 2019-11-13 DIAGNOSIS — L03.114 LEFT ARM CELLULITIS: ICD-10-CM

## 2019-11-13 DIAGNOSIS — M25.512 ACUTE PAIN OF LEFT SHOULDER: ICD-10-CM

## 2019-11-13 DIAGNOSIS — L90.5 SCAR CONDITION AND FIBROSIS OF SKIN: ICD-10-CM

## 2019-11-13 DIAGNOSIS — M24.512 CONTRACTURE, LEFT SHOULDER: ICD-10-CM

## 2019-11-13 DIAGNOSIS — C50.412 MALIGNANT NEOPLASM OF UPPER-OUTER QUADRANT OF LEFT FEMALE BREAST, UNSPECIFIED ESTROGEN RECEPTOR STATUS (H): ICD-10-CM

## 2020-01-14 ENCOUNTER — COMMUNICATION - HEALTHEAST (OUTPATIENT)
Dept: ONCOLOGY | Facility: HOSPITAL | Age: 65
End: 2020-01-14

## 2020-01-21 ENCOUNTER — AMBULATORY - HEALTHEAST (OUTPATIENT)
Dept: OTHER | Facility: CLINIC | Age: 65
End: 2020-01-21

## 2020-01-28 ENCOUNTER — COMMUNICATION - HEALTHEAST (OUTPATIENT)
Dept: OTHER | Facility: CLINIC | Age: 65
End: 2020-01-28

## 2020-02-19 ENCOUNTER — HOSPITAL ENCOUNTER (OUTPATIENT)
Dept: CT IMAGING | Facility: HOSPITAL | Age: 65
Discharge: HOME | End: 2020-02-19

## 2020-02-19 ENCOUNTER — RECORDS - HEALTHEAST (OUTPATIENT)
Dept: ADMINISTRATIVE | Facility: OTHER | Age: 65
End: 2020-02-19

## 2020-02-19 DIAGNOSIS — R33.9 INCOMPLETE BLADDER EMPTYING: ICD-10-CM

## 2020-02-19 DIAGNOSIS — K59.00 CONSTIPATION: ICD-10-CM

## 2020-02-19 DIAGNOSIS — C50.919 BREAST CANCER (H): ICD-10-CM

## 2020-02-19 LAB
CREAT BLD-MCNC: 1.1 MG/DL (ref 0.6–1.1)
GFR SERPL CREATININE-BSD FRML MDRD: 50 ML/MIN/1.73M2

## 2020-02-20 ENCOUNTER — COMMUNICATION - HEALTHEAST (OUTPATIENT)
Dept: ONCOLOGY | Facility: CLINIC | Age: 65
End: 2020-02-20

## 2020-02-21 ENCOUNTER — COMMUNICATION - HEALTHEAST (OUTPATIENT)
Dept: ONCOLOGY | Facility: HOSPITAL | Age: 65
End: 2020-02-21

## 2020-02-28 ENCOUNTER — COMMUNICATION - HEALTHEAST (OUTPATIENT)
Dept: ONCOLOGY | Facility: HOSPITAL | Age: 65
End: 2020-02-28

## 2020-02-28 ENCOUNTER — AMBULATORY - HEALTHEAST (OUTPATIENT)
Dept: INFUSION THERAPY | Facility: HOSPITAL | Age: 65
End: 2020-02-28

## 2020-02-28 ENCOUNTER — OFFICE VISIT - HEALTHEAST (OUTPATIENT)
Dept: ONCOLOGY | Facility: HOSPITAL | Age: 65
End: 2020-02-28

## 2020-02-28 DIAGNOSIS — Z17.0 MALIGNANT NEOPLASM OF UPPER-OUTER QUADRANT OF LEFT BREAST IN FEMALE, ESTROGEN RECEPTOR POSITIVE (H): ICD-10-CM

## 2020-02-28 DIAGNOSIS — C50.412 MALIGNANT NEOPLASM OF UPPER-OUTER QUADRANT OF LEFT BREAST IN FEMALE, ESTROGEN RECEPTOR POSITIVE (H): ICD-10-CM

## 2020-02-28 DIAGNOSIS — M81.0 AGE-RELATED OSTEOPOROSIS WITHOUT CURRENT PATHOLOGICAL FRACTURE: ICD-10-CM

## 2020-02-28 DIAGNOSIS — E55.9 VITAMIN D DEFICIENCY: ICD-10-CM

## 2020-02-28 LAB — 25(OH)D3 SERPL-MCNC: 35.8 NG/ML (ref 30–80)

## 2020-02-28 ASSESSMENT — MIFFLIN-ST. JEOR: SCORE: 1210.4

## 2020-05-19 ENCOUNTER — COMMUNICATION - HEALTHEAST (OUTPATIENT)
Dept: ONCOLOGY | Facility: HOSPITAL | Age: 65
End: 2020-05-19

## 2020-05-19 DIAGNOSIS — C50.412 MALIGNANT NEOPLASM OF UPPER-OUTER QUADRANT OF LEFT BREAST IN FEMALE, ESTROGEN RECEPTOR POSITIVE (H): ICD-10-CM

## 2020-05-19 DIAGNOSIS — Z17.0 MALIGNANT NEOPLASM OF UPPER-OUTER QUADRANT OF LEFT BREAST IN FEMALE, ESTROGEN RECEPTOR POSITIVE (H): ICD-10-CM

## 2020-05-20 ENCOUNTER — COMMUNICATION - HEALTHEAST (OUTPATIENT)
Dept: ONCOLOGY | Facility: HOSPITAL | Age: 65
End: 2020-05-20

## 2020-05-20 DIAGNOSIS — C50.412 MALIGNANT NEOPLASM OF UPPER-OUTER QUADRANT OF LEFT BREAST IN FEMALE, ESTROGEN RECEPTOR POSITIVE (H): ICD-10-CM

## 2020-05-20 DIAGNOSIS — Z17.0 MALIGNANT NEOPLASM OF UPPER-OUTER QUADRANT OF LEFT BREAST IN FEMALE, ESTROGEN RECEPTOR POSITIVE (H): ICD-10-CM

## 2020-05-20 RX ORDER — ANASTROZOLE 1 MG/1
TABLET ORAL
Qty: 90 TABLET | Refills: 0 | Status: SHIPPED | OUTPATIENT
Start: 2020-05-20

## 2020-06-10 ENCOUNTER — COMMUNICATION - HEALTHEAST (OUTPATIENT)
Dept: ONCOLOGY | Facility: CLINIC | Age: 65
End: 2020-06-10

## 2020-06-12 ENCOUNTER — COMMUNICATION - HEALTHEAST (OUTPATIENT)
Dept: ADMINISTRATIVE | Facility: HOSPITAL | Age: 65
End: 2020-06-12

## 2020-06-16 ENCOUNTER — COMMUNICATION - HEALTHEAST (OUTPATIENT)
Dept: ONCOLOGY | Facility: HOSPITAL | Age: 65
End: 2020-06-16

## 2020-06-16 DIAGNOSIS — M81.0 AGE-RELATED OSTEOPOROSIS WITHOUT CURRENT PATHOLOGICAL FRACTURE: ICD-10-CM

## 2020-12-24 ENCOUNTER — RECORDS - HEALTHEAST (OUTPATIENT)
Dept: ADMINISTRATIVE | Facility: OTHER | Age: 65
End: 2020-12-24

## 2020-12-24 LAB
LAB AP CHARGES (HE HISTORICAL CONVERSION): ABNORMAL
PATH REPORT.ADDENDUM SPEC: ABNORMAL
PATH REPORT.COMMENTS IMP SPEC: ABNORMAL
PATH REPORT.COMMENTS IMP SPEC: ABNORMAL
PATH REPORT.FINAL DX SPEC: ABNORMAL
PATH REPORT.GROSS SPEC: ABNORMAL
PATH REPORT.MICROSCOPIC SPEC OTHER STN: ABNORMAL
PATH REPORT.MICROSCOPIC SPEC OTHER STN: ABNORMAL
PATH REPORT.RELEVANT HX SPEC: ABNORMAL
RESULT FLAG (HE HISTORICAL CONVERSION): ABNORMAL

## 2021-05-12 ENCOUNTER — AMBULATORY - HEALTHEAST (OUTPATIENT)
Dept: PHARMACY | Facility: HOSPITAL | Age: 66
End: 2021-05-12

## 2021-05-27 NOTE — PROGRESS NOTES
[facility] Hematology and Oncology Progress Note    Patient: Linda Canada  MRN: [id number]  Date of Service: 3/29/2019        Reason for Visit    Follow-up breast cancer on adjuvant endocrine therapy.    Assessment and Plan  Cancer Staging  Malignant neoplasm of upper-outer quadrant of left female breast (H)  Staging form: Breast, AJCC 7th Edition  - Clinical: No stage assigned - Unsigned  - Pathologic stage from 6/14/2017: Stage IIIA (T2, N2a, cM0) - Signed by Joie Ferro MD on 6/14/2017  ER Status: Positive  DE Status: Positive  HER2 Status: Positive      ECOG Performance   ECOG Performance Status: 1     Distress Assessment  Distress Assessment Score: 8(due to pain)    Pain  Currently in Pain: Yes  Pain Score (Initial OR Reassessment): 9  Location: hands, arms, back,       #.  Stage IIIA (pT2, pN2a M0) invasive ductal carcinoma of the left breast, grade 3, ER positive, DE positive and HER-2 positive.  Post lumpectomy and left axillary lymph node dissection in April 2015, followed by adjuvant chemotherapy with TCH P and adjuvant radiation.  She is currently on adjuvant endocrine therapy which started in March 2018.     She has a few expected side effects from anastrozole.  She is wondering about changing to a different AI, however after reviewing the possible similar side effects profile, she agreed to stay on anastrozole at this point.  No clinical evidence of breast cancer recurrence.  Her diagnostic mammogram in June 2018 was benign.   Follow-up with me in 6 months.    #.  Osteoporosis   Her DEXA scan from yesterday showed a decline in bone density with spine T score-3.0.  We discussed about bisphosphonate therapy and she agreed.  I reviewed the side effects in detail.     She is continuing calcium and vitamin D twice a day.  She is not able to do much of exercises at this point.   Return next week for re-class yearly.   Follow-up bone density scan in March 2020.    #.  Left shoulder adhesive capsulitis  which has been very limiting her left arm range of motion and activity.   Improving.    #. Depression/anxiety- chronic.   -Feeling overall stable.    #.  Discussed about clinical trial for YOCAS study.  She is interested in that.  She will follow-up with our research coordinator.    Problem List    1. Malignant neoplasm of upper-outer quadrant of left breast in female, estrogen receptor positive (H)     2. Other osteoporosis without current pathological fracture        ______________________________________________________________________________    Diagnosis  5/31/17  Stage IIIA (pT2, pN2a, M0) invasive ductal carcinoma of the left breast   - ER (high positive, 80-90%), [place] (high positive, 90%) HER2 (+ by FISH by avg HER2 signals 5.3, HER2/CEP17 ratio 2.3)  - [place] grade 3  - Tumor size: 26 mm  - Margins positive and posterior margin for invasive component and positive for posterior margin for DCIS.  - Angiolymphatic invasion present.  - 5/12 left axillary lymph node positive for carcinoma  - associated DCIS, grade 2, solid and cribriform with focal comedonecrosis.  20%,      PET scan did not show distant metastases.      Therapy to date:  5/31/17 - left partial mastectomy and Left axillary lymph node dissection  6/23/17- 10/6/17-C#1-6 TCHP; added neulasta support to TCHP starting C#2.  Herceptin to complete one year on 5/25/18.  1/22/2018-completed adjuvant radiation to the left breast 6040 cGy/33 fractions  3/2/2018-initiated adjuvant endocrine therapy with anastrozole.    History of Present Illness  Linda is accompanied by Keysha today.      She has gained about 5 pounds.  Ongoing hot flashes, stiffness in the hands and pain, tingling numbness in the fingertips and feet.  She also complained about cramps in her feet and back.  Occasional shortness of breath and trouble swallowing.  She has not been able to do a lot of exercises, however she is doing some exercises.  She is going to [place] next week and plan to  return back here in June.  Her mood is overall stable/improving.  No concern about her breasts.    Pain Status  Currently in Pain: Yes    Review of Systems    Constitutional  Constitutional (WDL): Exceptions to WDL  Fatigue: Fatigue relieved by rest  Fever: None  Chills: Mild sensation of cold, shivering, chattering of teeth  Weight Gain: 5 - <10% from baseline(5# 1/31/19)  Weight Loss: None  Neurosensory  Neurosensory (WDL): Exceptions to WDL  Peripheral Motor Neuropathy: Asymptomatic, clinical or diagnostic observations only, intervention not indicated  Ataxia: Asymptomatic, clinical or diagnostic observations only, intervention not indicated  Peripheral Sensory Neuropathy: Asymptomatic, loss of deep tendon reflexes or paresthesia(hands and feet)  Confusion: None  Syncope: None  Eye   Eye Disorder (WDL): All eye disorder elements are within defined limits  Ear  Ear Disorder (WDL): Exceptions to WDL(intermittent itchiness)  Ear Pain: None  Tinnitus: Mild symptoms, intervention not indicated  Cardiovascular  Cardiovascular (WDL): Exceptions to WDL  Palpitations: Definition: A disorder characterized by inflammation of the muscle tissue of the heart.(occ)  Edema: Yes(feels like hands, fingers swollen)  Phlebitis: None  Superficial thrombophlebitis: None  Pulmonary  Respiratory (WDL): Exceptions to WDL  Cough: Mild symptoms, nonprescription intervention indicated(occ, dry)  Dyspnea: Shortness of breath with moderate exertion  Hypoxia: None  Gastrointestinal  Gastrointestinal (WDL): Exceptions to WDL  Anorexia: None  Constipation: None  Diarrhea: Increase of <4 stools per day over baseline, mild increase in ostomy output compared to baseline(occ)  Dysphagia: Symptomatic, able to eat regular diet  Esophagitis: Asymptomatic, clinical or diagnostic observations only, intervention not indicated(occ)  Nausea: Loss of appetite without alteration in eating habits  Pharyngitis: None  Vomiting: None  Dysgeusia: None  Dry Mouth:  None  Genitourinary  Genitourinary (WDL): Exceptions to WDL(sensation have to go and then doesn't go as much as think should)  Urinary Frequency: None  Urinary Retention: None  Urinary Tract Pain: None  Lymphatic  Lymph (WDL): All lymph disorder elements are within defined limits  Musculoskeletal and Connective Tissue  Musculoskeletal and Connetive Tissue Disorders (WDL): Exceptions to WDL  Arthralgia: Severe pain, limiting self care ADL(hands)  Bone Pain: Severe pain, limiting self care ADL(back)  Muscle Weakness : None  Myalgia: Severe pain, limiting self care ADL(arms)  Integumentary  Integumentary (WDL): Exceptions to WDL  Alopecia: None  Rash Maculo-Papular: None  Pruritus: Mild or localized, topical intervention indicated(old right port site, feels like something there)  Urticaria: None  Palmar-Plantar Erythrodysesthesia Syndrome: None  Flushing: None  Patient Coping  Patient Coping: Open/discussion  Distress Assessment  Distress Assessment Score: 8(due to pain)  Accompanied by  Accompanied by: Family Member(Partner, Terrie)  Oral Chemo Adherence       Past History  Past Medical History:   Diagnosis Date     Anxiety      Breast cancer (H) 05/31/2017    left breast     Depression      Neuropathy (H)      Panic attack        Physical Exam    Recent Vitals 3/29/2019   Height -   Weight 163 lbs 13 oz   BSA (m2) 1.8 m2   /68   Pulse 68   Temp 98   Temp src 1   SpO2 97   Some recent data might be hidden     General: alert, awake, not in acute distress  HEENT: Head: Normal, normocephalic, atraumatic.  Eye: Normal external eye, conjunctiva, lids cornea, BALA.  Ears: Non-tender.  Nose: Normal external nose, mucus membranes and septum.  Pharynx: Dental Hygiene adequate. Normal buccal mucosa.  Normal pharynx.  Neck / Thyroid: Supple, no masses, nodes, nodules or enlargement.  Lymphatics: No abnormally enlarged lymph nodes.  Chest: Normal chest wall and respirations. Clear to auscultation.  Breast: Left breast  showed well-healed scar with some skin erythema as expected.  Limited range of motion in the left shoulder, however it has much improved from prior visit.  No palpable adenopathy no masses.  Heart: S1 S2 RRR, no murmur.   Abdomen: abdomen is soft without significant tenderness, masses, organomegaly or guarding  Extremities: normal strength, tone, and muscle mass  Skin: Keloid formation in the prior port site.  No skin rash.  CNS: non focal.    Lab Results    No results found for this or any previous visit (from the past 168 hour(s)).    Imaging    Dxa Bone Density Scan    Result Date: 3/29/2019  3/28/2019 RE: Linda Canada YOB: 1955 Dear Joie Ferro, Patient Profile: 64 y.o. female, postmenopausal, is here for the follow up bone density test. History of fractures - None. Family history of osteoporosis - Unknown.  Family history of hip fracture: Yes;  mother. Smoking history - Past. Osteoporosis treatment past -  No. Osteoporosis treatment current - No.  Chronic medical problems - Breast cancer and Radiation treatment. High risk medications -  Chemotherapy;  Yes, in the Past and Aromatase Inhibitor;  Yes, Currently. Assessment: 1. The spine bone density L2-L3 with T-score -3.0. 2. Femoral bone densities show left femoral neck T- score -1.8 and right femoral neck T-score -1.6. 3. Trabecular bone score indicates moderate trabecular bone architecture. 64 y.o. female with OSTEOPOROSIS and HIGH fracture risk, currently on an aromatase inhibitor for treatment of breast cancer.. Since the previous bone density dated  March 16, 2018, there has been no statistically significant % change in the bone density of the spine and left total hip.  Additionally there has been a 5.0 % increase in the right total hip. Recommendations: Appropriate evaluation and treatment recommended with follow up bone density scan in 1-2 years. Bone densitometry was performed on your patient using our [employer] densitometer. The  results are summarized and a copy of the actual scans are included for your review. In conformity with the International Society of Clinical Densitometry's most recent position statement for DXA interpretation (2015), the diagnosis will be made on the lowest measured T-score of the lumbar spine, femoral neck, total proximal femur or 33% radius. Note the change in terminology for diagnostic classification from OSTEOPENIA to LOW BONE MASS. All trending for sequential exams will be done using multiple vertebrae or the total proximal femur. Fracture risk is based on the WHO Fracture Risk Assessment Tool (FRAX). If additional information is needed or if you would like to discuss the results, please do not hesitate to call me. Thank you for referring this patient to [facility]. We are happy to be of service in support of you and your practice. If you have any questions or suggestions to improve our service, please call me at [phone]. Sincerely, Amanda Davis M.D. CTriniCSOREN. Osteoporosis Services, [facility]    TT: 40 minutes and more than 50% was spent on coordination and counseling of care.    Signed by: Joie Ferro MD

## 2021-05-27 NOTE — PROGRESS NOTES
Pt came into infusion clinic for Reclast as ordered.. Labs Reviewed. Medications explained to pt who verbalized understanding. IV patent throughout infusion. Pt tolerated infusion with no complications. Pt left infusion clinic via ambulatory and will RTC as sched.

## 2021-05-27 NOTE — TELEPHONE ENCOUNTER
Linda calls in today stating that she received reclast for the first time yesterday and she is not feeling great today.  She is very fatigued.  She is achy and has taken aleve for this with some relief.  I told her that this is common.  She can take advil as needed for her achiness and I told her to rest today and she should start feeling better tomorrow, or for sure the next day.  She verbalized understanding.    Sandhya Lopez RN

## 2021-05-30 NOTE — PROGRESS NOTES
"[facility] Hematology and Oncology Progress Note    Patient: Linda Canada  MRN: [id number]  Date of Service: 7/26/2019        Reason for Visit    Follow-up breast cancer on adjuvant endocrine therapy.    Assessment and Plan  Cancer Staging  Malignant neoplasm of upper-outer quadrant of left female breast (H)  Staging form: Breast, AJCC 7th Edition  - Clinical: No stage assigned - Unsigned  - Pathologic stage from 6/14/2017: Stage IIIA (T2, N2a, cM0) - Signed by Joie Ferro MD on 6/14/2017  ER Status: Positive  WA Status: Positive  HER2 Status: Positive      ECOG Performance   ECOG Performance Status: 1     Distress Assessment  Distress Assessment Score: Unable to rate(\"high\" going through a lot of stuff right now, seeing psychologist, therapy)    Pain  Currently in Pain: Yes  Pain Score (Initial OR Reassessment): 8  Location: muscles, joints      #.  Stage IIIA (pT2, pN2a M0) invasive ductal carcinoma of the left breast, grade 3, ER positive, [place] positive and HER-2 positive.  Post lumpectomy and left axillary lymph node dissection in April 2015, followed by adjuvant chemotherapy with TCH P and adjuvant radiation.  She is currently on adjuvant endocrine therapy which started in March 2018.     She has ongoing several symptomatology possibly related to anastrozole.  It is likely to be attributed from a psychological stress as well.  She is very determined that she can continue anastrozole and overall she feels that she can live with the side effects.     She is overdue for screening mammogram and she agreed to complete in coming weeks.  Order placed.     Follow-up clinical exam in 3 months.      #.  Osteoporosis   Received Reclast dose #1 on 041 2019.  She feels that the treatment because her worsening musculoskeletal side effects.  I explained to her that this should be well by now but she still have persistent symptoms that not likely related from Zometa.  However she is very reluctant to continue with Zometa 1 " next dose due in April 2020, we can consider switching to Xgeva.  I explained to her that it would be safer for her to continue bone strengthening agent while she is on aromatase inhibitor therapy.   Continue calcium and vitamin D.  I recheck vitamin D level today and it was noted to be low and plan as below.   Follow-up bone density scan in March 2020.      #.  Vitamin D deficiency   We will replace with vitamin D 50,000 units weeklyfor 6 weeks, then to resume vitamin D 1000 units every day.  We will plan to recheck her vitamin D level in next visit.    #.  Left shoulder adhesive capsulitis which has been very limiting her left arm range of motion and activity.   Improving.    #. Depression/anxiety- chronic.   It is currently exacerbated by her social issues.  She is seeing psychologist regularly.    Problem List    1. Encounter for screening mammogram for breast cancer  Mammo Screening Bilateral    CANCELED: Mammo Screening Right   2. Malignant neoplasm of upper-outer quadrant of left breast in female, estrogen receptor positive (H)  Comprehensive Metabolic Panel    HM1(CBC and Differential)    HM1 (CBC with Diff)      ______________________________________________________________________________    Diagnosis  5/31/17  Stage IIIA (pT2, pN2a, M0) invasive ductal carcinoma of the left breast   - ER (high positive, 80-90%), ME (high positive, 90%) HER2 (+ by FISH by avg HER2 signals 5.3, HER2/CEP17 ratio 2.3)  - [place] grade 3  - Tumor size: 26 mm  - Margins positive and posterior margin for invasive component and positive for posterior margin for DCIS.  - Angiolymphatic invasion present.  - 5/12 left axillary lymph node positive for carcinoma  - associated DCIS, grade 2, solid and cribriform with focal comedonecrosis.  20%,      PET scan did not show distant metastases.      Therapy to date:  5/31/17 - left partial mastectomy and Left axillary lymph node dissection  6/23/17- 10/6/17-C#1-6 TCHP; added neulasta  support to [facility] starting C#2.  Herceptin to complete one year on 5/25/18.  1/22/2018-completed adjuvant radiation to the left breast 6040 cGy/33 fractions  3/2/2018-initiated adjuvant endocrine therapy with anastrozole.    History of Present Illness  Linda present as of today.  Is she is from marital issues with her wife.  She stated that she is going to start a divorce process.  She also does not feel very well at all in terms of hot flashes, neuropathy of hands and feet.  She has increasing joint and muscle aches since after somatic treatment and she think that was related to that.  She also has intermittent ear pain, radiating to the throat.  Intermittent dry cough with itchy sensation.  Intermittent bilateral lower extremity pain and swelling.  Intermittent nausea.  She reported that her range of motion in her arm has significantly improved since she was getting a massage therapy in [place].  As she came back from [place] earlier this month and planning to go back to October.  She still with her sisters.  She is not working currently.  Admitted that she has been taking the medication as directed along with calcium and vitamin D.    Pain Status  Currently in Pain: Yes    Review of Systems    Constitutional  Constitutional (WDL): Exceptions to WDL  Fatigue: Fatigue relieved by rest  Fever: None  Chills: None  Weight Gain: None  Weight Loss: None  Neurosensory  Neurosensory (WDL): Exceptions to WDL  Peripheral Motor Neuropathy: Asymptomatic, clinical or diagnostic observations only, intervention not indicated  Ataxia: Asymptomatic, clinical or diagnostic observations only, intervention not indicated(uses cane)  Peripheral Sensory Neuropathy: Asymptomatic, loss of deep tendon reflexes or paresthesia  Confusion: None  Syncope: None  Eye   Eye Disorder (WDL): Exceptions to WDL  Blurred Vision: None  Dry Eye: None  Eye Pain: Mild pain(intermittent)  Watering Eyes: Intervention not indicated  Ear  Ear Disorder (WDL):  "Exceptions to WDL  Ear Pain: Mild Pain(bilat ears, radiates to throat)  Tinnitus: None  Cardiovascular  Cardiovascular (WDL): Exceptions to WDL  Palpitations: None  Edema: No  Phlebitis: None  Superficial thrombophlebitis: None  Pulmonary  Respiratory (WDL): Exceptions to WDL  Cough: Mild symptoms, nonprescription intervention indicated(\"itching sensation\")  Dyspnea: Shortness of breath with moderate exertion  Hypoxia: None  Gastrointestinal  Gastrointestinal (WDL): Exceptions to WDL  Anorexia: Loss of appetite without alteration in eating habits  Constipation: None  Diarrhea: None  Dysphagia: Symptomatic, able to eat regular diet(sore throat radiating from ear pain)  Esophagitis: Asymptomatic, clinical or diagnostic observations only, intervention not indicated(occ)  Nausea: Loss of appetite without alteration in eating habits(intermittent)  Pharyngitis: None  Vomiting: None  Dysgeusia: None  Dry Mouth: None  Genitourinary  Genitourinary (WDL): Exceptions to WDL(\"minor buring\")  Urinary Frequency: None  Urinary Retention: None  Urinary Tract Pain: None  Lymphatic  Lymph (WDL): All lymph disorder elements are within defined limits  Musculoskeletal and Connective Tissue  Musculoskeletal and Connetive Tissue Disorders (WDL): Exceptions to WDL  Arthralgia: Severe pain, limiting self care ADL  Bone Pain: None  Muscle Weakness : Symptomatic, perceived by patient but not evident on physical exam  Myalgia: Severe pain, limiting self care ADL  Integumentary  Integumentary (WDL): All integumentary elements are within defined limits  Patient Coping  Patient Coping: Open/discussion;Anxiety  Distress Assessment  Distress Assessment Score: Unable to rate(\"high\" going through a lot of stuff right now, seeing psychologist, therapy)  Accompanied by  Accompanied by: Alone  Oral Chemo Adherence       Past History  Past Medical History:   Diagnosis Date     Anxiety      Breast cancer (H) 05/31/2017    left breast     Depression      " Neuropathy      Panic attack        Physical Exam    Recent Vitals 7/26/2019   Height -   Weight 155 lbs 11 oz   BSA (m2) 1.76 m2   /72   Pulse 70   Temp 99   Temp src 1   SpO2 97   Some recent data might be hidden     General: alert, awake, not in acute distress, looks tired.  HEENT: Head: Normal, normocephalic, atraumatic.  Eye: Normal external eye, conjunctiva, lids cornea, BALA.  Ears: Non-tender.  Nose: Normal external nose, mucus membranes and septum.  Pharynx: Dental Hygiene adequate. Normal buccal mucosa.  Normal pharynx.  Neck / Thyroid: Supple, no masses, nodes, nodules or enlargement.  Lymphatics: No abnormally enlarged lymph nodes.  Chest: Normal chest wall and respirations. Clear to auscultation.  Breast: Left breast showed well-healed scar with some skin erythema as expected.  Limited range of motion in the left shoulder, however it has much improved from prior visit.  No palpable adenopathy no masses.  Heart: S1 S2 RRR, no murmur.   Abdomen: abdomen is soft without significant tenderness, masses, organomegaly or guarding  Extremities: normal strength, tone, and muscle mass  Skin: Keloid formation in the prior port site.  No skin rash.  CNS: non focal.    Lab Results    No results found for this or any previous visit (from the past 168 hour(s)).    Imaging    No results found.    TT: 40 minutes: time consisted of medical record review, examination of patient, completing documentation and counseling time on side effects management and follow up, vitamin D replacement.     Signed by: Joie Ferro MD

## 2021-05-30 NOTE — PROGRESS NOTES
Pt continues to wear compression sleeve; today she did not . She complaint the pain on left arm; said that it is less painful.

## 2021-05-30 NOTE — PATIENT INSTRUCTIONS - HE
For new compression vest/bra:    [facility] (Please call to make an appointment)    [facility]  [address], [address]  [place], [place].  Phone: [phone]    [facility]  [address]  [place] [address] [phone]

## 2021-05-30 NOTE — PROGRESS NOTES
"Date Of Service: 07/18/19    Date last Seen:  01/31/19    PCP: Toña Barrera PA-C    Impression:   1.  Left arm swelling and left arm/shoulder tightness-stable  2.  Secondary post mastectomy lymphedema-stable  3.  Left shoulder contracture with cording and pain-improved  4.  Left shoulder pain- rotator cuff injury, bursal tear with capsulitis-improved  5.  Breast carcinoma (5/2017 left lumpectomy with LND, chemo/rad, stage IIIA ER/WV +, HER2 +)     Plan:  1.       Questions were answered.  2. She can return to Dr. Cleary to consider injection and possible surgery.  She is still considering moving to [place] to be closer to her family.  3. Continue her exercises and wearing compression.  Vest compression written for  4. Follow up in 12 months or when needed.    Time spent with patient 25 minutes, with greater than 50% time in consultation, education and coordination of care.  _____________________________________________________________________    Chief Complaint:  left arm swelling and left arm/shoulder tightness     History of Present Illness:  Linda Canada returns to the [facility] for her left arm swelling and left arm/shoulder tightness and numbness due to post mastectomy lymphedema with cording in the left axilla after being diagnosed with left breast carcinoma in May of 2017 specifically \"Stage IIIA (pT2, pN2a, M0) invasive ductal carcinoma of the left breast- grade 3, ER (high positive), WV (positive) and HER2 (positive) with associated high-grade DCIS.  Final margins for invasive carcinoma and DCIS were positive.   Left breast lumpectomy, left axillary sentinel lymph node biopsy and a left axillary lymph node dissection were done on 5/31/17.  She had chemotherapy and radiation with some peripheral neuropathic changes from the chemotherapy.  She does her exercises and has the compression sleeve. She had therapy for shoulder pain with minimal benefit. She saw Dr. Cleary for " evaluation for rotator cuff injury confirmed by left shoulder MRI.  She recently was in [place] and one of her friends was a therapist who worked with her sholavernler. Her ROM is better as is the pain. Feels the swelling is under good control.  She is still looking to move to [place] to be closer to her family. She is holding off for financial reasons.   There is no new unexplained weight loss, loss of appetite, nausea and/or vomiting, shortness of breath, weight loss and chest pain.  She continues to see her psychologist.      Past Medical History:   Diagnosis Date     Anxiety      Breast cancer (H) 05/31/2017    left breast     Depression      Neuropathy      Panic attack        Past Surgical History:   Procedure Laterality Date     BREAST LUMPECTOMY Left     May 2017     BREAST SURGERY Left 05/31/2017    left lumpectomy     [facility] VAD W/SUBQ PORT/ CTR/PRPH INSJ N/A 6/6/2018    Procedure: PORT REMOVAL ;  Surgeon: Naheed Kwong MD;  Location: [facility];  Service: General         Current Outpatient Medications:      anastrozole (ARIMIDEX) 1 mg tablet, Take 1 tablet (1 mg total) by mouth daily., Disp: 30 tablet, Rfl: 2     calcium-vitamin D 500 mg(1,250mg) -200 unit per tablet, Take 1 tablet by mouth 2 (two) times a day with meals., Disp: , Rfl: 0     FLUoxetine (PROZAC) 20 MG capsule, Take 40 mg by mouth daily. , Disp: , Rfl:      LORazepam (ATIVAN) 0.5 MG tablet, Take 0.5 mg by mouth daily as needed. , Disp: , Rfl:      naproxen (NAPROSYN) 375 MG tablet, Take 1 tablet (375 mg total) by mouth as needed., Disp: 30 tablet, Rfl: 1     omeprazole (PRILOSEC) 20 MG capsule, TAKE 1 CAPSULE(20 MG) BY MOUTH DAILY BEFORE BREAKFAST, Disp: 30 capsule, Rfl: 2     QUEtiapine (SEROQUEL) 50 MG tablet, Take 50 mg by mouth at bedtime. , Disp: , Rfl:      ranitidine (ZANTAC) 150 MG tablet, Take 150 mg by mouth. prn, Disp: , Rfl:      traZODone (DESYREL) 50 MG tablet, Take 50 mg by mouth at bedtime.    , Disp: ,  Rfl:      docusate sodium (COLACE) 100 MG capsule, Take 100 mg by mouth at bedtime as needed for constipation., Disp: , Rfl:     Allergies   Allergen Reactions     Penicillins Rash       Social History     Socioeconomic History     Marital status: Single     Spouse name: Gisella     Number of children: 0     Years of education: Not on file     Highest education level: Not on file   Occupational History     Occupation: NA     Comment: Partner works    Social Needs     Financial resource strain: Not on file     Food insecurity:     Worry: Not on file     Inability: Not on file     Transportation needs:     Medical: Not on file     Non-medical: Not on file   Tobacco Use     Smoking status: Former Smoker     Packs/day: 1.50     Years: 12.00     Pack years: 18.00     Types: Cigarettes     Last attempt to quit: 2002     Years since quittin.0     Smokeless tobacco: Never Used   Substance and Sexual Activity     Alcohol use: No     Comment: states has not drank in a couple months     Drug use: No     Sexual activity: Never   Lifestyle     Physical activity:     Days per week: Not on file     Minutes per session: Not on file     Stress: Not on file   Relationships     Social connections:     Talks on phone: Not on file     Gets together: Not on file     Attends Caodaism service: Not on file     Active member of club or organization: Not on file     Attends meetings of clubs or organizations: Not on file     Relationship status: Not on file     Intimate partner violence:     Fear of current or ex partner: Not on file     Emotionally abused: Not on file     Physically abused: Not on file     Forced sexual activity: Not on file   Other Topics Concern     Not on file   Social History Narrative    Female partner, recently .  On disability and trained as dental assistant.         Family History   Problem Relation Age of Onset     No Medical Problems Mother      Lung cancer Father 78     Diabetes Sister       Skin cancer Sister      Hypertension Sister        Review of Systems:  Review of systems is otherwise negative, except as noted above.  Full 12 point review of systems was completed.    Imaging:    I personally reviewed the following imaging today and those on care everywhere, if indicated    [facility]     DATE: 3/2/2018 11:49 AM     COMPARISON: 07/14/2017     CLINICAL HISTORY: Other chest pain     TECHNIQUE: Frontal and lateral radiographs of the chest are provided.     FINDINGS: The right subclavian chest port is unchanged from the prior examination. A few surgical clips are seen within the left axillary region. The lungs are clear. There is no pleural effusion or pneumothorax. The cardiomediastinal silhouette is   normal. Limited visualized portions of the upper abdomen are grossly normal.     IMPRESSION:   1.  Lungs are clear.    6/28/2018 left shoulder MRI  Conclusion  1.  Moderate to high-grade bursal sided tear involving supraspinatus footprint fibers.  No retraction.  2.  Mild subacromial/subdeltoid bursitis, extending into the subcoracoid bursa.  3.  Soft tissue thickening and edema within the rotator cuff interval, in keeping with focal capsulitis.  4.  Small acromial spur posteriorly.  Enthesopathy at the coracoacromial attachment site.      Labs:    I personally reviewed the following labs today and those on care everywhere, if indicated    No results found for: SEDRATE      No results found for: CRP        Lab Results   Component Value Date    CREATININE 0.77 04/01/2019      No results found for: HGBA1C        Lab Results   Component Value Date    BUN 13 04/01/2019              Lab Results   Component Value Date    ALBUMIN 4.0 04/01/2019       No results found for: [id number]    No results found for: TSH  No components found for: XJPN7LP      Physical Exam:  Vitals:    07/18/19 1012   BP: 114/70   Pulse: 64   Resp: 12   Temp: 98  F (36.7  C)      BMI 28.26 (stable)  Weight 154  pounds    Circumferential measures:    Vasc Edema 3/8/2018 5/10/2018 9/27/2018 1/31/2019 7/18/2019   Right-just above MCP 17.2 17.5 18.5 18 18.3   Right Wrist 15 14.5 15.2 14.7 15   Right Up 10cm 18.5 18 20 19.8 19.7   Right Up 10cm From Elbow 28.5 28.5 30.2 30.3 30.5   Left-just above MCP 17.5 18 18 18 18.5   Left Wrist 14.7 15 15 14.5 15.1   Left Up 10cm 18.2 18 22 21 20   Left Up 10cm From Elbow 29 31 32.5 32.8 33     Fairly stable    General:  64 y.o. female in no apparent distress.      Psych:  Alert and oriented x 3.  Cooperative. Affect normal.    HEENT:  Atraumatic and normocephalic.    Range of Motion:  Range of motion of elbows, wrists is normal bilaterally without active joint synovitis, erythema, joint swelling, crepitus or joint laxity.   Right shoulder range of motion is full with left is 160 degrees of forward flexion and abduction with less pain.  Range has significantly improved on the left.  There continues to be cording on the left anterior axilla, going into the left anterior chest wall.     Strength:  Normal strength testing in  elbow flexion, elbow extension, wrist extension, forearm supination, forearm pronation and hand intrinsics bilaterally.      Sensation: Intact pinprick and light touch along the upper extremities bilaterally.    Lymph nodes: No cervical, supraclavicular, infraclavicular, or axillary lymphadenopathy palpated.     Vascular: No unusual venous distention.  Radial arterial pulses strong and equal at the wrists bilaterally.      Skin: No unusual rubor, calor, masses or rashes along the skin in either arm.  No significant fibrosity or scarring.  No pain to palpation.     Margaret Domingo MD, ABWMS, FACCWS, [facility]  Medical Director Wound Care and Lymphedema  [facility]  [phone]

## 2021-05-30 NOTE — PROGRESS NOTES
S: Patient was seen in [place] to be measured for a compression bra/vest. Rx on file is current but needs to be modified for a lower compression level.    O: Goal is to evaluate and measure patient for a compression garment that will help control her post-mastectomy lymphedema. She reports pain and sensitivity below her left axilla down along her band line.     A: I showed her a couple different options for compression bras/vests but we leaned towards vests since her pain is along the band line. I took measurements for a [employer] compression vest: #462 Viviane in size large, black. Order submitted for fabrication to [employer].    P: I will call Linda once her garment is in to come in for a delivery appnt.

## 2021-05-31 VITALS — WEIGHT: 149.7 LBS | BODY MASS INDEX: 27.83 KG/M2

## 2021-05-31 VITALS — BODY MASS INDEX: 28.12 KG/M2 | WEIGHT: 148.8 LBS

## 2021-05-31 VITALS — BODY MASS INDEX: 27.76 KG/M2 | WEIGHT: 146.9 LBS

## 2021-05-31 VITALS — BODY MASS INDEX: 29.65 KG/M2 | WEIGHT: 159.5 LBS

## 2021-05-31 VITALS — BODY MASS INDEX: 29.84 KG/M2 | WEIGHT: 160.5 LBS

## 2021-05-31 VITALS — WEIGHT: 147 LBS | BODY MASS INDEX: 27.78 KG/M2

## 2021-05-31 VITALS — BODY MASS INDEX: 31.17 KG/M2 | HEIGHT: 61 IN | WEIGHT: 165.1 LBS

## 2021-05-31 VITALS — BODY MASS INDEX: 29.82 KG/M2 | WEIGHT: 160.4 LBS

## 2021-05-31 VITALS — WEIGHT: 162.7 LBS | HEIGHT: 62 IN | BODY MASS INDEX: 29.94 KG/M2

## 2021-05-31 VITALS — WEIGHT: 147.3 LBS | BODY MASS INDEX: 27.81 KG/M2 | HEIGHT: 61 IN

## 2021-05-31 VITALS — BODY MASS INDEX: 27.94 KG/M2 | WEIGHT: 148 LBS | HEIGHT: 61 IN

## 2021-05-31 VITALS — WEIGHT: 163 LBS | BODY MASS INDEX: 30.78 KG/M2 | HEIGHT: 61 IN

## 2021-05-31 VITALS — BODY MASS INDEX: 30.93 KG/M2 | WEIGHT: 166.4 LBS

## 2021-05-31 VITALS — BODY MASS INDEX: 27.87 KG/M2 | WEIGHT: 147.5 LBS

## 2021-05-31 VITALS — WEIGHT: 145 LBS | BODY MASS INDEX: 27.4 KG/M2

## 2021-05-31 VITALS — BODY MASS INDEX: 29.5 KG/M2 | WEIGHT: 158.7 LBS

## 2021-05-31 VITALS — BODY MASS INDEX: 27.96 KG/M2 | WEIGHT: 148 LBS

## 2021-05-31 VITALS — HEIGHT: 62 IN | WEIGHT: 158.8 LBS | BODY MASS INDEX: 29.22 KG/M2

## 2021-05-31 VITALS — BODY MASS INDEX: 29.79 KG/M2 | WEIGHT: 161.9 LBS | HEIGHT: 62 IN

## 2021-05-31 VITALS — WEIGHT: 157 LBS | BODY MASS INDEX: 29.18 KG/M2

## 2021-05-31 VITALS — WEIGHT: 156 LBS | HEIGHT: 61 IN | BODY MASS INDEX: 29.45 KG/M2

## 2021-05-31 VITALS — WEIGHT: 151.3 LBS | BODY MASS INDEX: 28.12 KG/M2

## 2021-05-31 VITALS — WEIGHT: 146.3 LBS | BODY MASS INDEX: 27.64 KG/M2

## 2021-05-31 VITALS — WEIGHT: 148 LBS | BODY MASS INDEX: 27.94 KG/M2 | HEIGHT: 61 IN

## 2021-05-31 VITALS — BODY MASS INDEX: 27.36 KG/M2 | WEIGHT: 144.8 LBS

## 2021-05-31 VITALS — BODY MASS INDEX: 29.22 KG/M2 | WEIGHT: 157.2 LBS

## 2021-05-31 NOTE — PROGRESS NOTES
"This is a 64 y.o. woman who comes in for  continued follow-up of her left breast cancer.  She is now 2 years  status post left partial mastectomy with radiation.  This was followed by chemotherapy.  She is feeling well.  Overall she is doing well but she does complain of some pain where I took out her port.  Also at the lumpectomy site.      Please see the chart review for PMH, Meds, allergies, FH and SH.    ROS:  A 12 point comprehensive review of systems was negative except as noted.      Physical Exam:  /76   Pulse (!) 56   Resp 14   Ht 5' 2\" (1.575 m)   Wt 155 lb (70.3 kg)   SpO2 98%   BMI 28.35 kg/m    General appearance: alert, appears stated age and cooperative  Breasts: There are no palpable masses. There are minimal radiation changes. The scar has healed up well.  Lymph nodes: Cervical, supraclavicular, and axillary nodes normal.  Neurologic: Grossly normal  Skin: She does have a bit of a keloid at her old port site.    Data Review: Reviewed her current mammograms. No evidence of disease.      Impression: Personal History of breast cancer. NOD.  Is overall doing very well.  Discussed with the patient that follow-up with myself can now be as needed.  She will be continuing with yearly mammograms and following up with her primary care physician and oncologist.  She is actually going to be moving back to [place] so I printed up her pathology reports and operative report so that she could bring those with her.  Reassured her that the pain she is feeling is completely normal..    Plan: Follow up as needed.  Continue with yearly mammograms and continue to follow-up with oncology.    "

## 2021-05-31 NOTE — PROGRESS NOTES
S: Patient was seen in [place] to take a look at compression vests again. RX on-file is good for one more compression vest.    O: Goal is to evaluate and look over options again with Linda for different vests I can order in since the one she took home a few days ago has been causing her issues.    A: Linda reports that after having washed and wore the compression vest multiple days, it has become less stiff and more tolerable while sitting. She would like to order the same compression vest ([employer] #462) except in a size up (XL) and in white this time. She also would like to try the #461 in XL and black. I also showed Linda the [employer] catalog with longer vest options but she did not want to try those.    P: I will call Linda when her new vests are in. I will also request an RX for two more compression vests.

## 2021-05-31 NOTE — PROGRESS NOTES
Linda called me on 8/8 after her appnt today and said that her compression vest hurts her lower ribs while sitting down and wearing it. She would like one that goes further down her waist. We set up an appnt for her to come back in on Monday, August 12th at 2:00 pm in [place] to look over other options.

## 2021-05-31 NOTE — PROGRESS NOTES
S: I have received Linda's [employer] #462 Viviane compression vest (LG) in black. RX on-file is current from Dr. Domingo.    A: I assisted Linda in donning her compression garment. Once donned, the garment appeared to be fitting well and she stated it was comfortable. She could tell it was providing her more support to the left breast compared to her sports bra. She was instructed on the donning and doffing process, the uses of the garment, and how to care for the item. She went home with one compression vest.    P: She is to call with any further needs. She plans to call to let me know if this is or isn't working out for her. If it is, she would like to order another to be shipped out.  Goal is to maintain a home program.

## 2021-05-31 NOTE — PROGRESS NOTES
S: I have received Linda's [employer] #462 Viviane compression vest (XL) in black (one as a new order and one as the exchange on the previous order). RX on-file is current.    A: No assessment was made. She picked up the garments from the [facility] and signed the necessary paperwork.    P: Linda is to call for any other concerns or questions.    
no

## 2021-05-31 NOTE — PROGRESS NOTES
S: I have received Linda's [employer] #462 Viviane compression vest (XL) in white and [employer] #461 Senia compression vest (XL) in black. RX on-file is current from Dr. Domingo.    A: I assisted Linda in donning her compression garments. Once donned, the garments appeared to be fitting well and she stated it was comfortable. She thought these sizes up were much more comfortable in her ribs and sides than the size large had been. At our last appnt she had said she would continue wearing the #462 in a large because it was becoming more tolerable. Today, however she chose to leave it behind and said she wants to exchange it for a larger size. I will try and obtain an RA # from [employer] if it is not too late. She left with the two compression vests today.     P: Linda requested I order in another #462 in black XL (so really two of those since I am also ordering in an exchange garment). When I call her to let her know that those are in, she will let me know how the new one is working out for her- the #461 and she will decide if she would like to reorder.  Goal is to maintain a home program.

## 2021-06-01 VITALS — HEIGHT: 62 IN | BODY MASS INDEX: 28.52 KG/M2 | WEIGHT: 155 LBS

## 2021-06-01 VITALS — WEIGHT: 145 LBS | BODY MASS INDEX: 24.75 KG/M2 | HEIGHT: 64 IN

## 2021-06-01 VITALS — BODY MASS INDEX: 24.59 KG/M2 | HEIGHT: 64 IN | WEIGHT: 144 LBS

## 2021-06-01 VITALS — WEIGHT: 145.5 LBS | HEIGHT: 64 IN | BODY MASS INDEX: 24.84 KG/M2

## 2021-06-01 VITALS — WEIGHT: 148.7 LBS | BODY MASS INDEX: 25.52 KG/M2

## 2021-06-01 VITALS — WEIGHT: 153.8 LBS | BODY MASS INDEX: 26.4 KG/M2

## 2021-06-01 VITALS — HEIGHT: 64 IN | BODY MASS INDEX: 24.77 KG/M2 | WEIGHT: 145.06 LBS

## 2021-06-01 VITALS — WEIGHT: 146.6 LBS | BODY MASS INDEX: 25.16 KG/M2

## 2021-06-01 VITALS — BODY MASS INDEX: 26.19 KG/M2 | WEIGHT: 152.6 LBS

## 2021-06-02 VITALS — BODY MASS INDEX: 28.53 KG/M2 | WEIGHT: 156 LBS

## 2021-06-02 VITALS — WEIGHT: 158.4 LBS | HEIGHT: 62 IN | BODY MASS INDEX: 29.15 KG/M2

## 2021-06-02 VITALS — BODY MASS INDEX: 28.97 KG/M2 | WEIGHT: 158.4 LBS

## 2021-06-02 VITALS — BODY MASS INDEX: 28.55 KG/M2 | WEIGHT: 156.1 LBS

## 2021-06-02 VITALS — WEIGHT: 163.8 LBS | BODY MASS INDEX: 29.96 KG/M2

## 2021-06-02 VITALS — WEIGHT: 157 LBS | BODY MASS INDEX: 28.89 KG/M2 | HEIGHT: 62 IN

## 2021-06-02 NOTE — PROGRESS NOTES
"[facility] Hematology and Oncology Progress Note    Patient: Linda Canada  MRN: [id number]  Date of Service: 10/4/2019        Reason for Visit    Follow-up breast cancer on adjuvant endocrine therapy.    Assessment and Plan  Cancer Staging  Malignant neoplasm of upper-outer quadrant of left female breast (H)  Staging form: Breast, AJCC 7th Edition  - Clinical: No stage assigned - Unsigned  - Pathologic stage from 6/14/2017: Stage IIIA (T2, N2a, cM0) - Signed by Joie Ferro MD on 6/14/2017  ER Status: Positive  MT Status: Positive  HER2 Status: Positive      ECOG Performance   ECOG Performance Status: 1     Distress Assessment  Distress Assessment Score: Unable to rate(\"high\"-life stress going on right now/sees psychologist)    Pain  Currently in Pain: Yes  Pain Score (Initial OR Reassessment): 8  Location: muscles, joints      #.  Stage IIIA (pT2, pN2a M0) invasive ductal carcinoma of the left breast,, grade 3, ER positive, MT positive and HER-2 positive.  Post lumpectomy and left axillary lymph node dissection in April 2015, followed by adjuvant chemotherapy with TCH P and adjuvant radiation.  She is currently on adjuvant endocrine therapy which started in March 2018.     There is no clinical or mammographic evidence of breast cancer recurrence.  She has ongoing some symptoms possibly related to anastrozole.  She also admitted that some of those are from psychological stress.  She agreed to continue anastrozole. I stressed her the importance of taking the medication regularly.    Follow-up clinical exam in about March 2020 or sooner with any concern.  If she moves to [place], she will transfer her care to cancer clinic close to where she is going to live.    #.  Osteoporosis   Received Reclast dose #1 on 4/1/2019.  She feels that the treatment because her worsening musculoskeletal side effects. She is very reluctant to continue with Zometa 1 next dose due in April 2020, we can consider switching to Xgeva.  " I explained to her that it would be safer for her to continue bone strengthening agent while she is on aromatase inhibitor therapy.   Continue calcium and vitamin D.  Increased vitamin D dose as below.    Follow-up bone density scan in March 2020.      #.  Vitamin D deficiency   Completed weekly high-dose vitamin D for 6 weeks.  Follow-up vitamin D level 2 weeks ago showed slight improvement to vitamin D of 20.  I recommended to increase vitamin D to 2000 units every day.  Recheck vitamin D level in about 3 months.      #.  Left shoulder adhesive capsulitis which has been very limiting her left arm range of motion and activity.   Improving/stable.    #. Depression/anxiety- chronic.   It is currently exacerbated by her social issues.  She is seeing psychologist regularly.    Problem List    1. Malignant neoplasm of upper-outer quadrant of left breast in female, estrogen receptor positive (H)     2. Aromatase inhibitor use        ______________________________________________________________________________    Diagnosis  5/31/17  Stage IIIA (pT2, pN2a, M0) invasive ductal carcinoma of the left breast   - ER (high positive, 80-90%), TN (high positive, 90%) HER2 (+ by FISH by avg HER2 signals 5.3, HER2/CEP17 ratio 2.3)  - [place] grade 3  - Tumor size: 26 mm  - Margins positive and posterior margin for invasive component and positive for posterior margin for DCIS.  - Angiolymphatic invasion present.  - 5/12 left axillary lymph node positive for carcinoma  - associated DCIS, grade 2, solid and cribriform with focal comedonecrosis.  20%,      PET scan did not show distant metastases.      Therapy to date:  5/31/17 - left partial mastectomy and Left axillary lymph node dissection  6/23/17- 10/6/17-C#1-6 TCHP; added neulasta support to TCHP starting C#2.  Herceptin to complete one year on 5/25/18.  1/22/2018-completed adjuvant radiation to the left breast 6040 cGy/33 fractions  3/2/2018-initiated adjuvant endocrine  therapy with anastrozole.    History of Present Illness  Cassia present herself today.  She has ongoing neuropathy in her hands and feet but overall stable.  She has a flashes.  She is trying to take anastrozole regularly but she admitted that she may miss a few pills here and there.  She is currently under a lot of ongoing stress from divorce.  She is thinking about moving to [place] to live with 1 of her nephew and traveling back and forth to [place] where her sisters live.   No new bone pain.  Appetite is okay.    Pain Status  Currently in Pain: Yes    Review of Systems    Constitutional  Constitutional (WDL): Exceptions to WDL  Fatigue: None  Fever: 38.0 - 39.0 degrees C (100.4 - 102.2 degrees F)  Chills: None  Neurosensory  Neurosensory (WDL): Exceptions to WDL  Peripheral Motor Neuropathy: Moderate symptoms, limiting instrumental ADL(L > R hand)  Ataxia: Asymptomatic, clinical or diagnostic observations only, intervention not indicated(uses cane)  Peripheral Sensory Neuropathy: Moderate symptoms, limiting instrumental ADL(hands and feet)  Confusion: None  Syncope: Fainting, orthostatic collapse(intermittently feels like going to pass out, accompanied by SOB)  Eye   Eye Disorder (WDL): Exceptions to WDL  Blurred Vision: None  Dry Eye: None  Eye Pain: None  Watering Eyes: Intervention not indicated  Ear  Ear Disorder (WDL): Exceptions to WDL  Ear Pain: Mild Pain(itchy)  Tinnitus: None  Cardiovascular  Cardiovascular (WDL): Exceptions to WDL  Palpitations: None  Edema: Yes  Edema Limbs: 5 - 10% inter-limb discrepancy in volume or circumference at point of greatest visible difference, swelling or obscuration of anatomic architecture on close inspection(bilat LE)  Phlebitis: None  Superficial thrombophlebitis: None  Pulmonary  Respiratory (WDL): Exceptions to WDL  Cough: None  Dyspnea: Shortness of breath with moderate exertion  Hypoxia: None  Gastrointestinal  Gastrointestinal (WDL): Exceptions to WDL  Anorexia:  "Loss of appetite without alteration in eating habits  Constipation: None  Diarrhea: None  Dysphagia: Symptomatic, able to eat regular diet  Esophagitis: Asymptomatic, clinical or diagnostic observations only, intervention not indicated(occ)  Nausea: None  Pharyngitis: None  Vomiting: None  Dysgeusia: None  Dry Mouth: Symptomatic (e.g., dry or thick saliva) without significant dietary alteration, unstimulated saliva flow >0.2 ml/min  Genitourinary  Genitourinary (WDL): Exceptions to WDL(\"small burning, once in awhile\")  Urinary Frequency: None  Urinary Retention: None  Urinary Tract Pain: None  Lymphatic  Lymph (WDL): All lymph disorder elements are within defined limits  Musculoskeletal and Connective Tissue  Musculoskeletal and Connetive Tissue Disorders (WDL): Exceptions to WDL  Arthralgia: Severe pain, limiting self care ADL  Bone Pain: None  Muscle Weakness : Symptomatic, perceived by patient but not evident on physical exam  Myalgia: Severe pain, limiting self care ADL  Integumentary  Integumentary (WDL): All integumentary elements are within defined limits  Patient Coping  Patient Coping: Open/discussion  Distress Assessment  Distress Assessment Score: Unable to rate(\"high\"-life stress going on right now/sees psychologist)  Accompanied by  Accompanied by: Alone  Oral Chemo Adherence       Past History  Past Medical History:   Diagnosis Date     Anxiety      Breast cancer (H) 05/31/2017    left breast     Depression      Neuropathy      Panic attack        Physical Exam    Recent Vitals 10/4/2019   Height -   Weight 154 lbs 5 oz   BSA (m2) 1.75 m2   /72   Pulse 60   Temp 98.2   Temp src 1   SpO2 98   Some recent data might be hidden     General: alert, awake, not in acute distress  HEENT: Head: Normal, normocephalic, atraumatic.  Eye: Normal external eye, conjunctiva, lids cornea, BALA.  Ears:  Non-tender.  Nose: Normal external nose, mucus membranes and septum.  Pharynx: Dental Hygiene adequate. Normal " buccal mucosa. Normal pharynx.  Neck / Thyroid: Supple, no masses, nodes, nodules or enlargement.  Lymphatics: No abnormally enlarged lymph nodes.  Chest: Normal chest wall and respirations. Clear to auscultation.  Breasts: deferred to next visit.  Heart: S1 S2 RRR, no murmur.   Abdomen: abdomen is soft without significant tenderness, masses, organomegaly or guarding  Extremities: normal strength, tone, and muscle mass  Skin: normal. no rash or abnormalities  CNS: non focal.    Lab Results    No results found for this or any previous visit (from the past 168 hour(s)).    Imaging    No results found.      Signed by: Joie Ferro MD

## 2021-06-03 VITALS — HEIGHT: 62 IN | WEIGHT: 155 LBS | BODY MASS INDEX: 28.52 KG/M2

## 2021-06-03 VITALS — WEIGHT: 154.5 LBS | BODY MASS INDEX: 28.43 KG/M2 | HEIGHT: 62 IN

## 2021-06-03 VITALS — WEIGHT: 155.7 LBS | BODY MASS INDEX: 28.48 KG/M2

## 2021-06-03 VITALS
WEIGHT: 154.3 LBS | BODY MASS INDEX: 28.22 KG/M2 | SYSTOLIC BLOOD PRESSURE: 120 MMHG | TEMPERATURE: 98.2 F | DIASTOLIC BLOOD PRESSURE: 72 MMHG | OXYGEN SATURATION: 98 % | HEART RATE: 60 BPM

## 2021-06-03 NOTE — PROGRESS NOTES
S: Patient seen in [place] to be measured for a left arm sleeve with attached gauntlet. A new RX from Dr. Domingo will be requested.    O: Goal is to evaluate and measure patient for a compression garment that will help control her lymphedema. Observations show there is swelling present from lymphatic fluid. The expected outcome with compliant use is to reduce swelling and control the patient's condition.     A: I took new measurements for a [employer] Arm Sleeve w/ Attached Gauntlet 20-30 mmHg like she received last time from me (size 2 EW). Linda reported having lost a little weight and her circumferences are down slightly but she still sizes into the same size as last time. This garment will help to maintain the reduced volume and shape of the limb. It will also help to inhibit further fluid accumulation. Order submitted for fabrication to [employer] for one arm sleeve with attached gauntlet 20-30 mmHg in black.    P: I will call patient when items arrive to ship to her home address which has been verified again. First, I will make sure her insurance is active again. She let me know her CM sent in her paperwork to process and be eligible again for MA.

## 2021-06-03 NOTE — TELEPHONE ENCOUNTER
Linda called on 11/6/19 to reorder more compression vests. We ended up scheduling an appnt for next Tuesday, 11/12 at 9:00 am in [place] so she can look over the catalog again and pick out the ones she likes best.

## 2021-06-04 VITALS
WEIGHT: 157 LBS | DIASTOLIC BLOOD PRESSURE: 79 MMHG | HEIGHT: 62 IN | BODY MASS INDEX: 28.89 KG/M2 | SYSTOLIC BLOOD PRESSURE: 124 MMHG | HEART RATE: 61 BPM | OXYGEN SATURATION: 98 %

## 2021-06-05 NOTE — TELEPHONE ENCOUNTER
Patient calls in today asking if she can have her DEXA scan soon and then see Dr. Ferro in follow-up regarding the DEXA scan results.  I let her know that her most recent DEXA scan was on 3/28/2019. Per insurance guidelines this cannot be done again until 3/29/2020 or after in order for insurance to pay for this.  Patient understands this and has asked that we push out her DEXA scan beyond that date and then her lab, MD appointment a few days after her scan.  This message has been sent to the schedulers.    Sandhya Lopez RN

## 2021-06-05 NOTE — PROGRESS NOTES
S: I spoke with Linda on the phone and her  to discuss her financials. Her and her CM were able to tell me that Linda has Medicare which they are aware will not cover for compression garments. They would like me to order her another compression vest to add on to the order with her arm sleeve. Having a higher price in cost will help her to get coverage from the [facility] since it will be over the cost of her spend-down.     A: I placed an order for another [employer] compression vest #[id number] Viviane (size XL) in black. I also added this to the original sales order and printed off two copies of the delivery quote to mail to Linda's home address, which was verified.    P: Upon receiving her garments I will give her a call and

## 2021-06-05 NOTE — TELEPHONE ENCOUNTER
"<HTML><META HTTP-EQUIV=\"content-type\" CONTENT=\"text/html;charset=utf-8\">I called Linda on 1/28/2020 and let her know I got her compression vest in and it is here in [place] with her arm sleeve. She meets with her CM this Thursday so she will discuss with her how long it will take for her to get the money from the [facility]. It may take longer than expected so I will wait to hear back from her CM or her on Thursday.   "

## 2021-06-06 NOTE — PROGRESS NOTES
[facility] Hematology and Oncology Progress Note    Patient: Linda Canada  MRN: [id number]  Date of Service: 2/28/2020        Reason for Visit    Follow-up breast cancer on adjuvant endocrine therapy.    Assessment and Plan  Cancer Staging  Malignant neoplasm of upper-outer quadrant of left female breast (H)  Staging form: Breast, AJCC 7th Edition  - Clinical: No stage assigned - Unsigned  - Pathologic stage from 6/14/2017: Stage IIIA (T2, N2a, cM0) - Signed by Joie Ferro MD on 6/14/2017  ER Status: Positive  MN Status: Positive  HER2 Status: Positive      ECOG Performance   ECOG Performance Status: 1     Distress Assessment  Distress Assessment Score: Unable to rate    Pain  Currently in Pain: No/denies      #.  Stage IIIA (pT2, pN2a M0) invasive ductal carcinoma of the left breast, grade 3, ER positive, MN positive and HER-2 positive.  Post lumpectomy and left axillary lymph node dissection in April 2015, followed by adjuvant chemotherapy with TCH P and adjuvant radiation.  She is currently on adjuvant endocrine therapy which started in March 2018.     There is no clinical evidence of breast cancer recurrence.  She has ongoing concern about other symptoms (constipation, urinary retention, risk of other cancer) from anastrozole.  I explained to her that anastrozole does not cause ovarian cancer as she believed all she was told by the other doctors.  The constipation and urinary retentions are not likely from anastrozole either.  I again stressed about the importance of endocrine therapy in strongly ER/MN positive high-grade, locally advanced breast cancer.  I discussed about using an alternative AI if she is interested.  After a thorough discussion, she is comfortable continuing anastrozole. As she is moving to [place] next week, she will transfer her care including cancer care to [place].  I strongly encouraged her to have a regular follow-up with oncology locally.    #.  Scattered low-density liver  lesions of less than 10 cm by CT scan on 2/19/2020, found incidentally.   She had prior PET scan in 2017 and it was not clearly evident at that time.  Her labs from a week ago was unremarkable.  I recommend a follow-up CT scan of the abdomen pelvis in about 3-4 months.  I wrote down the instruction to her.    #.  Osteoporosis   Received Reclast dose #1 on 4/1/2019.  She feels that the treatment because her worsening musculoskeletal side effects. She is very reluctant to continue with Zometa.   She is due for bone density scan next month but she is not able to do so in [place].  She will completed in [place].  She is also due for bone strengthener.  I discussed about the possible use of denosumab or Fosamax but I will defer this discussion to her primary care provider or oncologist in [place].     I discussed about the importance of taking calcium, vitamin D, weightbearing exercises and bone strengthening agent while she is on anastrozole.    #.  Vitamin D deficiency   Completed weekly high-dose vitamin D for 6 weeks.  She has been on vitamin D 2000 units every day.  Recheck vitamin D level today showed adequate C.  Recommended to continue current supplement dose of vitamin D.     #.  Left shoulder adhesive capsulitis which has been very limiting her left arm range of motion and activity.   Improving/stable.    #. Depression/anxiety- chronic.   It is currently exacerbated by her social issues.  She is seeing psychologist regularly.    I wished her well for her new adventure in [place].  She is very excited to be closer to her sisters.  I invited calls and I will be available for any questions arise in the future.    Problem List    1. Malignant neoplasm of upper-outer quadrant of left breast in female, estrogen receptor positive (H)        ______________________________________________________________________________    Diagnosis  5/31/17  Stage IIIA (pT2, pN2a, M0) invasive ductal carcinoma of the left  breast   - ER (high positive, 80-90%), [place] (high positive, 90%) HER2 (+ by FISH by avg HER2 signals 5.3, HER2/CEP17 ratio 2.3)  - [place] grade 3  - Tumor size: 26 mm  - Margins positive and posterior margin for invasive component and positive for posterior margin for DCIS.  - Angiolymphatic invasion present.  - 5/12 left axillary lymph node positive for carcinoma  - associated DCIS, grade 2, solid and cribriform with focal comedonecrosis.  20%,      PET scan did not show distant metastases.      Therapy to date:  5/31/17 - left partial mastectomy and Left axillary lymph node dissection  6/23/17- 10/6/17-C#1-6 TCHP; added neulasta support to TCHP starting C#2.  Herceptin to complete one year on 5/25/18.  1/22/2018-completed adjuvant radiation to the left breast 6040 cGy/33 fractions  3/2/2018-initiated adjuvant endocrine therapy with anastrozole.    History of Present Illness  Cassia presents herself today.  She is moving to [place] next week on 3/4/2020.  She has been dealing with a lot of stress from the divorce and separation.  She presented to her primary doctor clinic about a week ago with abdominal discomfort attributed to constipation.  She stated that she was using mineral water and prune juice but not very helpful.  She also thought she had bladder infection.  She did not have bladder infection.  She subsequently underwent CT scan which was fairly unremarkable except diffuse subcentimeter nodules in the liver.  She is here to discuss about whether she should continue anastrozole.  She is wondering whether all her symptoms are related to anastrozole.  She is not able to complete DEXA scan here because all her insurance in [place] ends today.      Pain Status  Currently in Pain: No/denies    Review of Systems    Constitutional  Constitutional (WDL): Exceptions to WDL  Fatigue: Concerns(daily fatigue)  Neurosensory  Neurosensory (WDL): Exceptions to WDL  Ataxia: Concerns(uses cane regularly)  Eye   Eye  "Disorder (WDL): Assessment not pertinent to visit  Ear  Ear Disorder (WDL): Assessment not pertinent to visit  Cardiovascular  Cardiovascular (WDL): All cardiovascular elements are within defined limits  Pulmonary  Respiratory (WDL): Exceptions to WDL  Dyspnea: Concerns(worsening lately)  Gastrointestinal  Gastrointestinal (WDL): Exceptions to WDL  Esophagitis: Concerns(using omperazole daily)  Constipation: Concerns(using only mineral water)  Genitourinary  Genitourinary (WDL): Exceptions to WDL  Urinary Retention: Concerns(went to see primary; worsening at night. Negative for UTI)  Lymphatic  Lymph (WDL): All lymph disorder elements are within defined limits  Musculoskeletal and Connective Tissue  Musculoskeletal and Connetive Tissue Disorders (WDL): Exceptions to WDL  Arthralgia: Concerns(generalized aches and pains)  Integumentary  Integumentary (WDL): All integumentary elements are within defined limits  Patient Coping  Patient Coping: Accepting;Open/discussion  Accompanied by  Accompanied by: Alone  Oral Chemo Adherence         Past History  Past Medical History:   Diagnosis Date     Anxiety      Breast cancer (H) 05/31/2017    left breast     Depression      Neuropathy      Panic attack        Physical Exam    Recent Vitals 2/28/2020   Height 5' 2\"   Weight 157 lbs   BSA (m2) 1.76 m2   /79   Pulse 61   Temp -   Temp src -   SpO2 98   Some recent data might be hidden     General: alert, awake, not in acute distress  HEENT: Head: Normal, normocephalic, atraumatic.  Eye: Normal external eye, conjunctiva, lids cornea, BALA.  Ears:  Non-tender.  Nose: Normal external nose, mucus membranes and septum.  Pharynx: Dental Hygiene adequate. Normal buccal mucosa. Normal pharynx.  Neck / Thyroid: Supple, no masses, nodes, nodules or enlargement.  Lymphatics: No abnormally enlarged lymph nodes.  Chest: Normal chest wall and respirations. Clear to auscultation.  Breasts: No palpable masses in both breasts.  Indurated " area along the postlumpectomy scar in the left breast.  Heart: S1 S2 RRR, no murmur.   Abdomen: abdomen is soft without significant tenderness, masses, organomegaly or guarding  Extremities: normal strength, tone, and muscle mass  Skin: normal. no rash or abnormalities  CNS: non focal.    Lab Results    Recent Results (from the past 168 hour(s))   Vitamin D, Total (25-Hydroxy)   Result Value Ref Range    Vitamin D, Total (25-Hydroxy) 35.8 30.0 - 80.0 ng/mL       Imaging    Ct Abdomen Pelvis With Oral With Iv Contrast    Result Date: 2/19/2020  EXAM: CT ABDOMEN PELVIS W ORAL W IV CONTRAST LOCATION: [facility] DATE/TIME: 2/19/2020 5:46 PM INDICATION: Retention of urine, unspecified COMPARISON: PET/CT from 11/22/2017. TECHNIQUE: CT scan of the abdomen and pelvis was performed following injection of IV contrast. Multiplanar reformats were obtained. Dose reduction techniques were used. CONTRAST: Iohexol (Omni) 75mL FINDINGS: LOWER CHEST: Normal. HEPATOBILIARY: Scattered low-density liver lesions measuring less than 10 mm, too small to characterize. The gallbladder is contracted. PANCREAS: Normal. SPLEEN: Normal. ADRENAL GLANDS: Normal. KIDNEYS/BLADDER: Symmetric enhancement. No hydronephrosis. The bladder is decompressed. BOWEL: No mechanical bowel obstruction. Nondilated appendix. No free air. No focal bowel wall thickening. LYMPH NODES: Normal. VASCULATURE: Unremarkable. PELVIC ORGANS: Retroverted uterus. No evidence of mass. MUSCULOSKELETAL: Normal.     1.  The urinary bladder is decompressed on this exam, which hinders evaluation of the bladder wall. There is no obvious mass. 2.  The kidneys are normal.         Signed by: Joie Ferro MD

## 2021-06-06 NOTE — PROGRESS NOTES
Patient is here for labs and provider for Malignant neoplasm of upper-outer quadrant of left breast in female, estrogen receptor positive.

## 2021-06-06 NOTE — PATIENT INSTRUCTIONS - HE
"Miralax 17 grams powder mix in at least 8 oz of water/juice daily as needed for constipation for more than 3 days.    Follow up on bone density scan and to resume medication for osteoporosis.    Follow up CT abdomen/pelvis in 3-4 months (May or June 2020)  to follow up for \"scattered low-density liver lesions measuring less than 10 mm\".     "

## 2021-06-06 NOTE — TELEPHONE ENCOUNTER
----- Message from Joie Ferro MD sent at 2/28/2020  2:24 PM CST -----  Please inform her that her vitamin D level is in good range now. Stay on the same dose of vitamin D. thanks

## 2021-06-06 NOTE — TELEPHONE ENCOUNTER
Vero, nurse with [facility] called and asked for a call back stating they had update on patients CT results.  Called Vero and she states that patient CT was normal but the PCP would like Dr. Ferro to review.  Patient was also asking that she be seen prior to her moving to [place] in March 2020.  Told Vero we would have schedulers check with patient that the 3/13/2020 appointment scheduled is before she moves.  Fax number given to Vero so she can fax results to us here for Dr. Ferro to review.

## 2021-06-06 NOTE — TELEPHONE ENCOUNTER
Patient had called and left message requesting CT results before her appt next Fri. Reviewed with Dr. Ferro. Results looked good-normal.  Patient called with these results.  Patient happy to hear these results.    Patient said [place] is moving to [place] soon and [place] records sent to [place]. She said her SW sent request, she said she may have sent online? I told her I didn't see a signed release and I instructed her to contact her SW to see if she had sent to us yet. I told her we couldn't release anything without this signed release.    Patient concerned about her bills. I told her I would contact her nurse navigator to see if there was any assistance available for her. Message to KVNG Ventrua.  Patient has appointment scheduled with her SW on Wed 2/26.    Patient reports that Dr. Ferro discussed changing her medication the last 2 years and is concerned about this medication potential medication change as the  at [facility] told her there is an increased risk for developing ovarian cancer.  I strongly encouraged patient to keep her appointment with Dr. Ferro next Fri, 2/28 so she can discuss plan in person. She said she will decide when she talks to her SW on Wed. Again, I recommended follow-up prior to her moving to [place].      I told her medical records were received from [facility] and given to Dr. Ferro to review.    Message to Dr. Pillo Anglin RN

## 2021-06-06 NOTE — TELEPHONE ENCOUNTER
Informed patient per Dr. Ferro that normal vitamin D and to continue current dose. Patient verbally understood and will continue.    Enid Michelle, CMA

## 2021-06-06 NOTE — TELEPHONE ENCOUNTER
Please tell her that ANASTROZOLE DOES NOT CAUSE OVARIAN CANCER. I already told her provider at [facility]. That was the incorrect statement.     Thanks.

## 2021-06-06 NOTE — TELEPHONE ENCOUNTER
"\"Please tell her that ANASTROZOLE DOES NOT CAUSE OVARIAN CANCER. I already told her provider at [facility]. That was the incorrect statement.\"     Called patient back with response above from Dr. Ferro.   Detailed message left/BETH Anglin RN   "

## 2021-06-08 NOTE — TELEPHONE ENCOUNTER
Are you able to check in on her? I am slammed right now with Karen taking on [facility] patients today.

## 2021-06-08 NOTE — TELEPHONE ENCOUNTER
I believe she moved to [place] and is transferring care to a local oncologist. Please update me on this. thanks

## 2021-06-08 NOTE — TELEPHONE ENCOUNTER
Patient called and requesting medical records to be sent to her new oncologist in [place]. She said they canceled her recent appointment because they didn't receive records from us. I told her that I would need to send her to medical records and they will help her with this. I told her that she will need to sign a medical release before records can be sent. Transferred patient to medical records at [phone]/BETH Anglin RN

## 2021-06-10 NOTE — PROGRESS NOTES
Linda is scheduled for left lumpectomy, axillary lymph node dissection, and port placement with Dr. Kwong on 5/31/17 at [facility]. Patient was given instructions of arrival time, need a , pre-op physical within 30days and NPO after midnight. Patient verbalized understanding.     Kay Rashid CMA  Physician    [facility]   [phone]

## 2021-06-11 NOTE — PROGRESS NOTES
Met with Linda to give her information about [employer] ([employer]).  Instructed her to call the number on the sheet for assistance.  Informed Linda I faxed her bill payment form and the information about her lease to [employer].  Upon checking the  portal I see they have issued a check #[id number] for that.  I was not able to reach anyone today at  to inquire about when it was or will be mailed.  Reminded Linda it will be mailed directly to her and then she submits it to [facility].  Jennifer Ventura RN

## 2021-06-11 NOTE — PROGRESS NOTES
[facility] Hematology and Oncology Progress Note    Patient: Linda Canada  MRN: [id number]  Date of Service: 7/7/2017        Reason for Visit    Follow-up breast cancer on adjuvant chemotherapy.    Assessment and Plan  Malignant neoplasm of upper-outer quadrant of left female breast    Staging form: Breast, AJCC 7th Edition      Clinical: No stage assigned - Unsigned      Pathologic stage from 6/14/2017: Stage IIIA (T2, N2a, cM0) - Signed by Joie Ferro MD on 6/14/2017      ECOG Performance   ECOG Performance Status: 1     Distress Assessment  Distress Assessment Score: 5 (anticipation of next treatment)    Pain  Currently in Pain: No/denies      # Stage IIIA (pT2, pN2a, M0) invasive ductal carcinoma of the left breast- grade 3, ER (high positive), AL (positive) and HER2 (positive) with associated high-grade DCIS.  Final margins for invasive carcinoma and DCIS are positive.  S/p left breast lumpectomy, left axillary sentinel lymph node biopsy and a left axillary lymph node dissection on 5/31/17.   - She is here today for a second follow-up after first treatment with [facility].  She is feeling much improved compared to last week.  Today we discussed about addition of Neulasta with next treatment to shorten the duration of neutropenia and to lessen  side effects from chemotherapy.   -Today we go over side effects management is again.  She has not been using EMLA cream prior to coming in for infusion.  She is reminded to use EMLA cream, so then she will not be painful with needle access.  She is encouraged to maintain hydration, nutrition. She was quite nervous but she is agreeable to continue with current chemotherapy with Neulasta support next week as scheduled.   -She will follow-up with me next week for clinical exam/lab prior to C#2 [facility].         #. Depression/anxiety- chronic with recent exacerbation due to recent diagnosis of breast cancer.                 -She will continue to work with her own psychologist at  [facility].  She is reminded to  Ativan from the prescription which was given by her mental health team and to start as needed basis.  She is also reminded to closely follow-up with them.       Problem List    1. Malignant neoplasm of upper-outer quadrant of left female breast  Infusion Appointment    [facility] OFFICE VISIT LONG      ______________________________________________________________________________    Diagnosis  5/31/17  Stage IIIA (pT2, pN2a, M0) invasive ductal carcinoma of the left breast   - ER (high positive, 80-90%), MO (high positive, 90%) HER2 (+ by FISH by avg HER2 signals 5.3, HER2/CEP17 ratio 2.3)  - Horacio grade 3  - Tumor size: 26 mm  - Margins positive and posterior margin for invasive component and positive for posterior margin for DCIS.  - Angiolymphatic invasion present.  - 5/12 left axillary lymph node positive for carcinoma  - associated DCIS, grade 2, solid and cribriform with focal comedonecrosis.  20%,      PET scan did not show distant metastases.      Therapy to date:  5/31/17 - left partial mastectomy and Left axillary lymph node dissection  6/23/17- C#1 [facility]  Plan is to complete adjuvant chemotherapy then to reevaluate for a reexcision lumpectomy later.    Cardiac MUGA  6/16/17- LVEF 59.4%    History of Present Illness    [employer]  was used for this encounter.  She is accompanied by her partner, Jacinda.    She reported she feels much better since the last 2 days.  She still have trouble with taste alteration.  Nausea has much improved and she has not been needing medication.  Her diarrhea has stopped 2 days ago as well.  She also reported that resolving burning sensation with urination.      Apart from describing in HPI and ROS, the remainder of comprehensive ROS was negative.    Pain Status  Currently in Pain: No/denies    Review of Systems    Constitutional  Constitutional (WDL): Exceptions to WDL  Fatigue: Fatigue relieved by  rest  Weight Gain: 5 - <10% from baseline (up 2lbs since 6/30)  Neurosensory  Neurosensory (WDL): Exceptions to WDL  Eye   Eye Disorder (WDL): All eye disorder elements are within defined limits  Ear  Ear Disorder (WDL): All ear disorder elements are within defined limits  Cardiovascular  Cardiovascular (WDL): All cardiovascular elements are within defined limits  Pulmonary  Respiratory (WDL): Exceptions to WDL  Cough: Mild symptoms, nonprescription intervention indicated (has a head cold)  Dyspnea: Shortness of breath with moderate exertion  Gastrointestinal  Gastrointestinal (WDL): Exceptions to WDL  Anorexia: Oral intake altered without significant weight loss or malnutrition, oral nutritional supplements indicated  Diarrhea: Increase of <4 stools per day over baseline, mild increase in ostomy output compared to baseline  Nausea: Loss of appetite without alteration in eating habits  Dry Mouth: Symptomatic (e.g., dry or thick saliva) without significant dietary alteration, unstimulated saliva flow >0.2 ml/min  Genitourinary  Genitourinary (WDL): All genitourinary elements are within defined limits  Lymphatic  Lymph (WDL): Exceptions to WDL (left axilla tenderness)  Musculoskeletal and Connective Tissue  Musculoskeletal and Connetive Tissue Disorders (WDL): Exceptions to WDL  Muscle Weakness : Symptomatic, perceived by patient but not evident on physical exam  Integumentary  Integumentary (WDL): All integumentary elements are within defined limits  Patient Coping  Patient Coping: Accepting  Distress Assessment  Distress Assessment Score: 5 (anticipation of next treatment)  Accompanied by  Accompanied by: Family Member  Oral Chemo Adherence       Past History  Past Medical History:   Diagnosis Date     Anxiety      Breast cancer 05/31/2017    left breast       Physical Exam    Recent Vitals 7/7/2017   Height -   Weight 160 lbs 6 oz   BSA (m2) -   /68   Pulse 70   Temp 98.7   Temp src 1   SpO2 98     General:  alert, awake, not in acute distress  HEENT: Head: Normal, normocephalic, atraumatic.  Eye: Normal external eye, conjunctiva, lids cornea, BALA.  Ears: Non-tender.  Nose: Normal external nose, mucus membranes and septum.  Pharynx: Dental Hygiene adequate. Normal buccal mucosa.  The meatus pharynx but no open ulceration.    Neck / Thyroid: Supple, no masses, nodes, nodules or enlargement.  Lymphatics: No abnormally enlarged lymph nodes.  Chest: Normal chest wall and respirations. Clear to auscultation.  Heart: S1 S2 RRR, no murmur.   Abdomen: abdomen is soft without significant tenderness, masses, organomegaly or guarding  Extremities: normal strength, tone, and muscle mass  Skin: normal. no rash or abnormalities  CNS: non focal.      Lab Results    No results found for this or any previous visit (from the past 168 hour(s)).    Imaging    Nm Muga    Result Date: 6/22/2017    The left ventricular ejection fraction is 59.4%.   Normal study.      Nm Pet Ct Skull To Mid Thigh    Result Date: 6/13/2017  PET FDG/CT 6/13/2017 12:26 PM INDICATION: Breast cancer, left, staging. Initial treatment strategy. TECHNIQUE: Serum glucose level 112 mg/dL. One hour post right antecubital intravenous administration of 8.7 mCi F-18 FDG, PET imaging was performed from the skull base to the mid thighs utilizing attenuation correction with concurrent axial CT and PET/CT  image fusion. Dose reduction techniques were used. COMPARISON: None. FINDINGS: HEAD AND NECK: Normal physiologic FDG uptake. CHEST: Postoperative changes left breast lumpectomy and left axillary lymphadenectomy with associated mild inflammatory uptake about the operative sites. A prominent 0.6 x 0.9 cm left axillary level II node adjacent to a surgical clip (axial fused image 109) demonstrates mild uptake (SUVmax 2.7). Subcutaneous postoperative fluid collection in the left mastectomy bed measures 4.8 x 8.2 x 5.6 cm containing an area of nondependent gas. Mild overlying skin  thickening. Normal physiologic FDG uptake elsewhere. Right IJ Port-A-Cath with tip in the RA. ABDOMEN/PELVIS: Normal physiologic FDG uptake. MUSCULOSKELETAL: No suspicious focal skeletal uptake to suggest osseous metastasis.     CONCLUSION: 1. Postoperative changes left breast and axilla. Nonspecific subcutaneous postoperative fluid collection. 2. A prominent mildly FDG avid left axillary level II node is nonspecific, but suspicious for a sofia metastasis, although could be inflammatory related to recent surgical dissection.         Signed by: RASTA Bonds

## 2021-06-11 NOTE — PROGRESS NOTES
The patient attended chemotherapy class today.  The patient was educated on:  -[facility] Educational Classes and Support Groups  -Chemotherapy: what it is and how it works  -Described a typical day at the treatment center and what the patient can expect  -Discussed port access and functions of the port   -Chemotherapy side effects and appropriate management of these side effects. SE discussed included and not limited to: Fatigue, nausea and vomiting, constipation, diarrhea, mucositis, alopecia,   anemia, thrombocytopenia, and neutropenia.    -Reasons to call the physician, including, fever>100.5, shaking chills, unusual bleeding or bruising, shortness of breath, vomiting>12hours, nausea >24 hours, unable to eat/drink >24 hours, painful urination, blood in urine or stool, soreness at the IV site, and painful mouth sores.  The  triage phone number was given to the patient and the patient was encouraged to call with any questions.  The patient was given a tour of the infusion center.  All questions were addressed to the best of my abilities and the patient was encouraged to call with any questions.  Time Spent: 90 minutes    Chemotherapy education was cut short based on her next appointment at 1230 for a MUGA scan. Will meet with the patient tomorrow before chemotherapy to discuss any questions that she has.     Chelsy Diaz, LethaD

## 2021-06-11 NOTE — PROGRESS NOTES
[facility] Hematology and Oncology Progress Note    Patient: Linda Canada  MRN: [id number]  Date of Service: 06/23/2017        Reason for Visit    Follow-up breast cancer starting adjuvant chemotherapy.    Assessment and Plan  Malignant neoplasm of upper-outer quadrant of left female breast    Staging form: Breast, AJCC 7th Edition      Clinical: No stage assigned - Unsigned      Pathologic stage from 6/14/2017: Stage IIIA (T2, N2a, cM0) - Signed by Joie Ferro MD on 6/14/2017      ECOG Performance   ECOG Performance Status: 1     Distress Assessment  Distress Assessment Score: Extreme distress (working with psychologist)    Pain  Currently in Pain: Yes  Pain Score (Initial OR Reassessment): 5  Location: Left breast      # Stage IIIA (pT2, pN2a, M0) invasive ductal carcinoma of the left breast- grade 3, ER (high positive), MN (positive) and HER2 (positive) with associated high-grade DCIS.  Final margins for invasive carcinoma and DCIS are positive.  S/p left breast lumpectomy, left axillary sentinel lymph node biopsy and a left axillary lymph node dissection on 5/31/17.   -Cardiac MUGA scan showed normal baseline cardiac function with LVEF 59.4%.  Her labs are adequate.  She is feeling well to start on chemotherapy.  She took dexamethasone.   -We will give cycle # 1 of TCHP today with full dose.    -She is advised to call me with any concerns or questions or side effects that she is not sure how to manage.  Follow-up with me next week for midcycle check.              - To follow up with Dr. Kwong at the end of 6 cycles of chemotherapy for discussion on the role of a reexcision.        #. Depression/anxiety- chronic with recent exacerbation due to recent diagnosis of breast cancer.                        -She will continue to work with her own psychologist at [facility].  She denies any other additional intervention at this point.  We will closely follow while she is on therapy.      Problem List    1.  Malignant neoplasm of upper-outer quadrant of left female breast  CC OFFICE VISIT LONG    CC OFFICE VISIT LONG    DISCONTINUED: sodium chloride 0.9% 250 mL infusion    DISCONTINUED: palonosetron injection 0.25 mg (ALOXI)    DISCONTINUED: dexamethasone 12 mg in sodium chloride 0.9% 25 mL (DECADRON)    DISCONTINUED: fosaprepitant 150 mg in sodium chloride 0.9% 150 mL (EMEND)    DISCONTINUED: pertuzumab 840 mg in sodium chloride 0.9% 250 mL chemo (PERJETA)    DISCONTINUED: trastuzumab 609 mg in sodium chloride 0.9% 250 mL chemo (HERCEPTIN)    DISCONTINUED: DOCEtaxel 140 mg in dextrose 5% (non-PVC) 250 mL chemo (TAXOTERE)    DISCONTINUED: CARBOplatin 670 mg in dextrose 5% 250 mL chemo (PARAPLATIN)    DISCONTINUED: sodium chloride 0.9 % flush 20 mL (NS)    DISCONTINUED: heparin 100 unit/mL lockflush (PF) porcine 300-600 Units    DISCONTINUED: diphenhydrAMINE injection 50 mg (BENADRYL)    DISCONTINUED: famotidine 20 mg/2 mL injection 20 mg (PEPCID)    DISCONTINUED: hydrocortisone sod succ (PF) 100 mg/2 mL injection 100 mg    DISCONTINUED: acetaminophen tablet 1,000 mg (TYLENOL)      ______________________________________________________________________________    Diagnosis  5/31/17  Stage IIIA (pT2, pN2a, M0) invasive ductal carcinoma of the left breast   - ER (high positive, 80-90%), WA (high positive, 90%) HER2 (+ by FISH by avg HER2 signals 5.3, HER2/CEP17 ratio 2.3)  - Horacio grade 3  - Tumor size: 26 mm  - Margins positive and posterior margin for invasive component and positive for posterior margin for DCIS.  - Angiolymphatic invasion present.  - 5/12 left axillary lymph node positive for carcinoma  - associated DCIS, grade 2, solid and cribriform with focal comedonecrosis.  20%,      PET scan did not show distant metastases.      Therapy to date:  5/31/17 - left partial mastectomy and Left axillary lymph node dissection  6/23/17- C#1 TCHP    Cardiac MUGA  6/16/17- LVEF 59.4%    History of Present  Illness    Professional [place]  was used for this encounter.  She is accompanied by her partner, Jacinda.    She attended a chemo class yesterday and felt it was very helpful.  She completed cardiac MUGA scan as well.  She has a lot of stress relating to her recent diagnosis and starting on chemotherapy.  She has been closely working with her [employer].  She felt quite awake last night after starting on dexamethasone.  She denies any fever, chest pain, shortness of breath or any infection symptoms.  She continues to have left-sided chest wall pain and axillary pain from surgery.  Motion in the left upper extremity is somewhat limited at this point.    Apart from describing in HPI and ROS, the remainder of comprehensive ROS was negative.    Pain Status  Currently in Pain: Yes    Review of Systems    Constitutional  Constitutional (WDL): Exceptions to WDL  Fatigue: Fatigue relieved by rest (not as much as yesterday, didn't sleep well last night)  Fever: None  Chills: None  Weight Gain: None  Weight Loss: None  Neurosensory  Neurosensory (WDL): Exceptions to WDL  Peripheral Motor Neuropathy: None  Ataxia: None  Peripheral Sensory Neuropathy: None  Confusion: None  Syncope: None  Eye   Eye Disorder (WDL): All eye disorder elements are within defined limits  Ear  Ear Disorder (WDL): All ear disorder elements are within defined limits  Cardiovascular  Cardiovascular (WDL): All cardiovascular elements are within defined limits  Pulmonary  Respiratory (WDL): Exceptions to WDL  Cough: Mild symptoms, nonprescription intervention indicated (dry)  Dyspnea: None  Hypoxia: None  Gastrointestinal  Gastrointestinal (WDL): Exceptions to WDL  Anorexia: None  Constipation: None  Diarrhea: None  Dysphagia: None  Esophagitis: Asymptomatic, clinical or diagnostic observations only, intervention not indicated (meds help)  Nausea: None  Pharyngitis: None  Vomiting: None  Dysgeusia: None  Dry Mouth: Symptomatic  (e.g., dry or thick saliva) without significant dietary alteration, unstimulated saliva flow >0.2 ml/min  Genitourinary  Genitourinary (WDL): Exceptions to WDL  Urinary Frequency: Present (due to drinking a lot of water)  Urinary Retention: None  Urinary Tract Pain: None  Lymphatic  Lymph (WDL): All lymph disorder elements are within defined limits  Musculoskeletal and Connective Tissue  Musculoskeletal and Connetive Tissue Disorders (WDL): All Musculoskeletal and Connetive Tissue Disorder elements are within defined limits  Integumentary  Integumentary (WDL): Exceptions to WDL (mosquito bites/Left chest)  Alopecia: None  Rash Maculo-Papular: None  Pruritus: Mild or localized, topical intervention indicated (mosquito bites)  Urticaria: None  Palmar-Plantar Erythrodysesthesia Syndrome: None  Flushing: None  Patient Coping  Patient Coping: Accepting  Distress Assessment  Distress Assessment Score: Extreme distress (working with psychologist)  Accompanied by  Accompanied by: Other (-Jared Crooks)  Oral Chemo Adherence       Past History  Past Medical History:   Diagnosis Date     Anxiety      Breast cancer 05/31/2017    left breast       Physical Exam    Recent Vitals 6/23/2017   Height -   Weight 166 lbs 6 oz   BSA (m2) -   /73   Pulse 83   Temp 97.5   Temp src 1   SpO2 97     General: alert, awake, not in acute distress  HEENT: Head: Normal, normocephalic, atraumatic.  Eye: Normal external eye, conjunctiva, lids cornea, BALA.  Ears: Non-tender.  Nose: Normal external nose, mucus membranes and septum.  Pharynx: Dental Hygiene adequate. Normal buccal mucosa. Normal pharynx.  Neck / Thyroid: Supple, no masses, nodes, nodules or enlargement.  Lymphatics: No abnormally enlarged lymph nodes.  Chest: Normal chest wall and respirations. Clear to auscultation.  Breasts: Bilateral nipples are everted. No palpable masses.  Postsurgical scar on the left breast with mild tenderness to touch.  No evidence of  erythema or drainage.  Heart: S1 S2 RRR, no murmur.   Abdomen: abdomen is soft without significant tenderness, masses, organomegaly or guarding  Extremities: normal strength, tone, and muscle mass  Skin: normal. no rash or abnormalities  CNS: non focal.      Lab Results    Recent Results (from the past 168 hour(s))   NM Muga   Result Value Ref Range    MUGA EF 59.4 %   Comprehensive Metabolic Panel   Result Value Ref Range    Sodium 136 136 - 145 mmol/L    Potassium 4.3 3.5 - 5.0 mmol/L    Chloride 104 98 - 107 mmol/L    CO2 23 22 - 31 mmol/L    Anion Gap, Calculation 9 5 - 18 mmol/L    Glucose 169 (H) 70 - 125 mg/dL    BUN 25 (H) 8 - 22 mg/dL    Creatinine 0.81 0.60 - 1.10 mg/dL    GFR MDRD Af Amer >60 >60 mL/min/1.73m2    GFR MDRD Non Af Amer >60 >60 mL/min/1.73m2    Bilirubin, Total 0.3 0.0 - 1.0 mg/dL    Calcium 9.6 8.5 - 10.5 mg/dL    Protein, Total 7.8 6.0 - 8.0 g/dL    Albumin 3.9 3.5 - 5.0 g/dL    Alkaline Phosphatase 93 45 - 120 U/L    AST 14 0 - 40 U/L    ALT 20 0 - 45 U/L   HM1 (CBC with Diff)   Result Value Ref Range    WBC 13.6 (H) 4.0 - 11.0 thou/uL    RBC 4.20 3.80 - 5.40 mill/uL    Hemoglobin 12.2 12.0 - 16.0 g/dL    Hematocrit 36.0 35.0 - 47.0 %    MCV 86 80 - 100 fL    MCH 29.0 27.0 - 34.0 pg    MCHC 33.9 32.0 - 36.0 g/dL    RDW 12.7 11.0 - 14.5 %    Platelets 234 140 - 440 thou/uL    MPV 11.8 8.5 - 12.5 fL    Neutrophils % 89 (H) 50 - 70 %    Lymphocytes % 10 (L) 20 - 40 %    Monocytes % 2 2 - 10 %    Eosinophils % 0 0 - 6 %    Basophils % 0 0 - 2 %    Neutrophils Absolute 12.0 (H) 2.0 - 7.7 thou/uL    Lymphocytes Absolute 1.3 0.8 - 4.4 thou/uL    Monocytes Absolute 0.2 0.0 - 0.9 thou/uL    Eosinophils Absolute 0.0 0.0 - 0.4 thou/uL    Basophils Absolute 0.0 0.0 - 0.2 thou/uL       Imaging    Nm Muga    Result Date: 6/22/2017    The left ventricular ejection fraction is 59.4%.   Normal study.      Nm Pet Ct Skull To Mid Thigh    Result Date: 6/13/2017  PET FDG/CT 6/13/2017 12:26 PM INDICATION:  Breast cancer, left, staging. Initial treatment strategy. TECHNIQUE: Serum glucose level 112 mg/dL. One hour post right antecubital intravenous administration of 8.7 mCi F-18 FDG, PET imaging was performed from the skull base to the mid thighs utilizing attenuation correction with concurrent axial CT and PET/CT  image fusion. Dose reduction techniques were used. COMPARISON: None. FINDINGS: HEAD AND NECK: Normal physiologic FDG uptake. CHEST: Postoperative changes left breast lumpectomy and left axillary lymphadenectomy with associated mild inflammatory uptake about the operative sites. A prominent 0.6 x 0.9 cm left axillary level II node adjacent to a surgical clip (axial fused image 109) demonstrates mild uptake (SUVmax 2.7). Subcutaneous postoperative fluid collection in the left mastectomy bed measures 4.8 x 8.2 x 5.6 cm containing an area of nondependent gas. Mild overlying skin thickening. Normal physiologic FDG uptake elsewhere. Right IJ Port-A-Cath with tip in the RA. ABDOMEN/PELVIS: Normal physiologic FDG uptake. MUSCULOSKELETAL: No suspicious focal skeletal uptake to suggest osseous metastasis.     CONCLUSION: 1. Postoperative changes left breast and axilla. Nonspecific subcutaneous postoperative fluid collection. 2. A prominent mildly FDG avid left axillary level II node is nonspecific, but suspicious for a sofia metastasis, although could be inflammatory related to recent surgical dissection.         Signed by: Joie Ferro MD

## 2021-06-11 NOTE — PROGRESS NOTES
Linda came to chemo infusion this morning after lab, MD visit and Chest Xray for start of cycle 2 using TCHP chemotherapy to treat her breast cancer.  Lab results and cxr results noted.  Pt was educated on her plan of care and each medication was reviewed prior to administration.  She viewed with neulasta on body infector patient education video with  present as well.  This information was reviewed again verbally prior to patient leaving today. There was a  present for this as well.  She received treatment as ordered and tolerated it well while in clinic. IV dex was held per Dr Ferro instructions as patient is taking oral dex.  Port was flushed, heparinized then deaccessed and site covered. On body injector placed.  Pt d/c from clinic ambulatory accompanied by family.

## 2021-06-11 NOTE — PROGRESS NOTES
Pt here for first treatment.  Pt saw MD first and now to chemo infusion.  All medications reviewed with pt and her partner,  present throughout.  Per Dr Pillo mckeon for pt not to take this evenings dose of dex as she received some IV with treatment.  Pt aware to take 2 doses tomorrow.  Port flushed and deaccessed upon completion and pt d/c ambulatory to [facility].  Pt aware of treatment p;an and next appointment

## 2021-06-11 NOTE — PROGRESS NOTES
Met with Linda and her partner as they arrive for chemo class.  Presented Linda with a donated bag of supportive goods helpful to patients during chemo.  Informed her of the award of a general jose from [employer] in the amount of $600 and offered to assist in processing of that jose. [facility] and use jose toward rent.  Gave her materials about [employer] and reminded her she will be hearing from them in approximately two weeks. Jennifer Ventura RN

## 2021-06-11 NOTE — PROGRESS NOTES
[facility] Hematology and Oncology Progress Note    Patient: Linda Canada  MRN: [id number]  Date of Service: 6/30/2017        Reason for Visit    Follow-up breast cancer on adjuvant chemotherapy.    Assessment and Plan  Malignant neoplasm of upper-outer quadrant of left female breast    Staging form: Breast, AJCC 7th Edition      Clinical: No stage assigned - Unsigned      Pathologic stage from 6/14/2017: Stage IIIA (T2, N2a, cM0) - Signed by Joie Ferro MD on 6/14/2017      ECOG Performance   ECOG Performance Status: 2     Distress Assessment  Distress Assessment Score: Extreme distress (feeling so poorly)    Pain  Currently in Pain: Yes  Pain Score (Initial OR Reassessment): 4  Location: left breast      # Stage IIIA (pT2, pN2a, M0) invasive ductal carcinoma of the left breast- grade 3, ER (high positive), [place] (positive) and HER2 (positive) with associated high-grade DCIS.  Final margins for invasive carcinoma and DCIS are positive.  S/p left breast lumpectomy, left axillary sentinel lymph node biopsy and a left axillary lymph node dissection on 5/31/17.   - She is here today for midcycle check after first treatment with TCHP.  She has several expected side effects from chemotherapy including mild mucositis, fatigue, diarrhea, nausea, heartburn.   -Labs were reviewed and noted neutropenia otherwise unremarkable.   -I explained to her that we would need to add Neulasta with next treatment to shorten the duration of neutropenia unless side effects from chemotherapy.   -Sent prescription for Zofran for nausea if not controlled with Compazine, omeprazole for heartburn, Imodium for diarrhea.  She is also advised to do baking soda mouthwash for irritation in her mouth.   -She is also advised to  Ativan as suggested by her psychiatrist/psychologist to control her anxiety.   -I suggested her to check urine test and culture to evaluate for any evidence of urinary tract infection.   -She requested to follow-up  with me next week for clinical exam.          #. Depression/anxiety- chronic with recent exacerbation due to recent diagnosis of breast cancer.                 -She will continue to work with her own psychologist at [facility].  She is reminded to  Ativan from the prescription which was given by her mental health team and to start as needed basis.  She is also reminded to closely follow-up with them.       Problem List    1. Malignant neoplasm of upper-outer quadrant of left female breast  Urinalysis-UC if Indicated      ______________________________________________________________________________    Diagnosis  5/31/17  Stage IIIA (pT2, pN2a, M0) invasive ductal carcinoma of the left breast   - ER (high positive, 80-90%), [place] (high positive, 90%) HER2 (+ by FISH by avg HER2 signals 5.3, HER2/CEP17 ratio 2.3)  - [place] grade 3  - Tumor size: 26 mm  - Margins positive and posterior margin for invasive component and positive for posterior margin for DCIS.  - Angiolymphatic invasion present.  - 5/12 left axillary lymph node positive for carcinoma  - associated DCIS, grade 2, solid and cribriform with focal comedonecrosis.  20%,      PET scan did not show distant metastases.      Therapy to date:  5/31/17 - left partial mastectomy and Left axillary lymph node dissection  6/23/17- C#1 [facility]  Plan is to complete adjuvant chemotherapy then to reevaluate for a reexcision lumpectomy later.    Cardiac MUGA  6/16/17- LVEF 59.4%    History of Present Illness    Professional [employer]  was used for this encounter.  She is accompanied by her partner, Jacinda.    She complained of several side effects after chemotherapy.  She reported severe fatigue, mouth sores.  She initially had constipation but later she developed diarrhea in the last 3 days with about 3-4 times of watery stool.  She also complained about soreness in the bottom.  She lightheaded.  She has been drinking fluids okay.  She  complained about his alteration.  She tried Compazine for nausea but did not feel helpful.  She also tried Zantac for heartburn with minimal benefit.  She has not been taking any medication for diarrhea nor mouthwash for mouth sores.  No fever.  She complained about slight burning urination for the last couple days but no hematuria, suprapubic pain or flank pain.  She was suggested by her psych oncologist to start on Ativan but she has not  the prescription yet.  She also reported she has been quite anxious and under a lot of stress with side effects from chemotherapy.    Apart from describing in HPI and ROS, the remainder of comprehensive ROS was negative.    Pain Status  Currently in Pain: Yes    Review of Systems    Constitutional  Constitutional (WDL): Exceptions to WDL  Fatigue: Fatigue not relieved by rest - Limiting instrumental ADL  Weight Loss: to <10% from baseline, intervention not indicated (607 lbs)  Neurosensory     Eye   Eye Disorder (WDL): All eye disorder elements are within defined limits  Ear  Ear Disorder (WDL): All ear disorder elements are within defined limits  Cardiovascular  Cardiovascular (WDL): All cardiovascular elements are within defined limits  Pulmonary  Respiratory (WDL): Exceptions to WDL  Cough: Mild symptoms, nonprescription intervention indicated  Gastrointestinal  Gastrointestinal (WDL): Exceptions to WDL  Anorexia: Oral intake altered without significant weight loss or malnutrition, oral nutritional supplements indicated  Diarrhea: Increase of <4 stools per day over baseline, mild increase in ostomy output compared to baseline  Dysphagia: Symptomatic, able to eat regular diet  Nausea: Loss of appetite without alteration in eating habits  Dysgeusia: Altered taste with change in diet (e.g., oral supplements), noxious or unpleasant taste, loss of taste  Dry Mouth: Symptomatic (e.g., dry or thick saliva) without significant dietary alteration, unstimulated saliva flow >0.2  "ml/min  Genitourinary  Genitourinary (WDL): Exceptions to WDL  Urinary Tract Pain: Mild pain  Lymphatic  Lymph (WDL): Exceptions to WDL (left axilla numbness)  Musculoskeletal and Connective Tissue  Musculoskeletal and Connetive Tissue Disorders (WDL): Exceptions to WDL  Muscle Weakness : Symptomatic, perceived by patient but not evident on physical exam  Integumentary  Integumentary (WDL): All integumentary elements are within defined limits  Patient Coping  Patient Coping: Sadness;Open/discussion;Accepting  Distress Assessment  Distress Assessment Score: Extreme distress (feeling so poorly)  Accompanied by  Accompanied by: Family Member  Oral Chemo Adherence       Past History  Past Medical History:   Diagnosis Date     Anxiety      Breast cancer 05/31/2017    left breast       Physical Exam    Recent Vitals 6/30/2017   Height 5' 1.5\"   Weight 158 lbs 13 oz   BSA (m2) 1.77 m2   /75   Pulse 71   Temp 97.5   Temp src 1   SpO2 -     General: alert, awake, not in acute distress  HEENT: Head: Normal, normocephalic, atraumatic.  Eye: Normal external eye, conjunctiva, lids cornea, BALA.  Ears: Non-tender.  Nose: Normal external nose, mucus membranes and septum.  Pharynx: Dental Hygiene adequate. Normal buccal mucosa.  The meatus pharynx but no open ulceration.    Neck / Thyroid: Supple, no masses, nodes, nodules or enlargement.  Lymphatics: No abnormally enlarged lymph nodes.  Chest: Normal chest wall and respirations. Clear to auscultation.  Breasts: Bilateral nipples are everted. No palpable masses.  Postsurgical scar on the left breast with mild tenderness to touch.  No evidence of erythema or drainage.  Heart: S1 S2 RRR, no murmur.   Abdomen: abdomen is soft without significant tenderness, masses, organomegaly or guarding  Extremities: normal strength, tone, and muscle mass  Skin: normal. no rash or abnormalities  CNS: non focal.      Lab Results    Recent Results (from the past 168 hour(s))   Magnesium " (add-ons/treatment plan)   Result Value Ref Range    Magnesium 2.0 1.8 - 2.6 mg/dL   Comprehensive Metabolic Panel   Result Value Ref Range    Sodium 137 136 - 145 mmol/L    Potassium 4.2 3.5 - 5.0 mmol/L    Chloride 103 98 - 107 mmol/L    CO2 26 22 - 31 mmol/L    Anion Gap, Calculation 8 5 - 18 mmol/L    Glucose 104 70 - 125 mg/dL    BUN 13 8 - 22 mg/dL    Creatinine 0.76 0.60 - 1.10 mg/dL    GFR MDRD Af Amer >60 >60 mL/min/1.73m2    GFR MDRD Non Af Amer >60 >60 mL/min/1.73m2    Bilirubin, Total 0.4 0.0 - 1.0 mg/dL    Calcium 9.0 8.5 - 10.5 mg/dL    Protein, Total 7.0 6.0 - 8.0 g/dL    Albumin 3.7 3.5 - 5.0 g/dL    Alkaline Phosphatase 88 45 - 120 U/L    AST 13 0 - 40 U/L    ALT 24 0 - 45 U/L   HM1 (CBC with Diff)   Result Value Ref Range    WBC 1.7 (LL) 4.0 - 11.0 thou/uL    RBC 4.41 3.80 - 5.40 mill/uL    Hemoglobin 12.8 12.0 - 16.0 g/dL    Hematocrit 38.0 35.0 - 47.0 %    MCV 86 80 - 100 fL    MCH 29.0 27.0 - 34.0 pg    MCHC 33.7 32.0 - 36.0 g/dL    RDW 12.0 11.0 - 14.5 %    Platelets 200 140 - 440 thou/uL    MPV 11.5 8.5 - 12.5 fL   Manual Differential   Result Value Ref Range    Total Neutrophils % 25 (L) 50 - 70 %    Lymphocytes % 65 (H) 20 - 40 %    Monocytes % 8 2 - 10 %    Eosinophils %  1 0 - 6 %    Basophils % 1 0 - 2 %    Total Neutrophils Absolute 0.4 (L) 2.0 - 7.7 thou/ul    Lymphocytes Absolute 1.1 0.8 - 4.4 thou/uL    Monocytes Absolute 0.1 0.0 - 0.9 thou/uL    Eosinophils Absolute 0.0 0.0 - 0.4 thou/uL    Basophils Absolute 0.0 0.0 - 0.2 thou/uL    Platelet Estimate Normal Normal    Tear Drop Cells 1+ (!) Negative       Imaging    Nm Muga    Result Date: 6/22/2017    The left ventricular ejection fraction is 59.4%.   Normal study.      Nm Pet Ct Skull To Mid Thigh    Result Date: 6/13/2017  PET FDG/CT 6/13/2017 12:26 PM INDICATION: Breast cancer, left, staging. Initial treatment strategy. TECHNIQUE: Serum glucose level 112 mg/dL. One hour post right antecubital intravenous administration of 8.7 mCi  F-18 FDG, PET imaging was performed from the skull base to the mid thighs utilizing attenuation correction with concurrent axial CT and PET/CT  image fusion. Dose reduction techniques were used. COMPARISON: None. FINDINGS: HEAD AND NECK: Normal physiologic FDG uptake. CHEST: Postoperative changes left breast lumpectomy and left axillary lymphadenectomy with associated mild inflammatory uptake about the operative sites. A prominent 0.6 x 0.9 cm left axillary level II node adjacent to a surgical clip (axial fused image 109) demonstrates mild uptake (SUVmax 2.7). Subcutaneous postoperative fluid collection in the left mastectomy bed measures 4.8 x 8.2 x 5.6 cm containing an area of nondependent gas. Mild overlying skin thickening. Normal physiologic FDG uptake elsewhere. Right IJ Port-A-Cath with tip in the RA. ABDOMEN/PELVIS: Normal physiologic FDG uptake. MUSCULOSKELETAL: No suspicious focal skeletal uptake to suggest osseous metastasis.     CONCLUSION: 1. Postoperative changes left breast and axilla. Nonspecific subcutaneous postoperative fluid collection. 2. A prominent mildly FDG avid left axillary level II node is nonspecific, but suspicious for a sofia metastasis, although could be inflammatory related to recent surgical dissection.         Signed by: RASTA Bonds

## 2021-06-11 NOTE — PROGRESS NOTES
Met with Linda and partner in medical oncology reception area to introduce myself, inform her of clinic resources, and evaluate her needs.  We will need to start [employer] and [employer] grants as soon as treatment plan is known.  Her primary need is financial.  She is also receiving resources from [facility].  Jennifer Ventura RN

## 2021-06-11 NOTE — PROGRESS NOTES
[facility] Hematology and Oncology Progress Note    Patient: Linda Canada  MRN: [id number]  Date of Service: 7/14/2017        Reason for Visit    Follow-up breast cancer on adjuvant chemotherapy.    Assessment and Plan  Malignant neoplasm of upper-outer quadrant of left female breast    Staging form: Breast, AJCC 7th Edition    - Clinical: No stage assigned - Unsigned    - Pathologic stage from 6/14/2017: Stage IIIA (T2, N2a, cM0) - Signed by Joie Ferro MD on 6/14/2017    ECOG Performance   ECOG Performance Status: 1     Distress Assessment  Distress Assessment Score: 8 (anticipation of treatment today)    Pain  Currently in Pain: No/denies      # Stage IIIA (pT2, pN2a, M0) invasive ductal carcinoma of the left breast- grade 3, ER (high positive), [place] (positive) and HER2 (positive) with associated high-grade DCIS.  Final margins for invasive carcinoma and DCIS are positive.  S/p left breast lumpectomy, left axillary sentinel lymph node biopsy and a left axillary lymph node dissection on 5/31/17.   - She is here today for a second follow-up after first treatment with TCHP.  She is feeling well and labs are adequate. We will add of Neulasta with this treatment to shorten the duration of neutropenia and to lessen  sandy effects from chemotherapy.   -She will follow-up with me in 3 weeks for clinical exam/lab prior to C#3 TCHP.    #. Cough   -No fever.  Due to her ongoing chemotherapy, requested chest x-ray which did not reveal any evidence of consolidation or infiltration.  Continue supportive care with over-the-counter cough medication, plenty of fluid intake.    #.  Left upper extremity restricted range of motion.   -Referral to physical therapy.     #. Depression/anxiety- chronic with recent exacerbation due to recent diagnosis of breast cancer.                 -She will continue to work with her own psychologist at [facility].  She is reminded to take Ativan as needed basis.  She is also reminded to closely  follow-up with them.       Problem List    1. Malignant neoplasm of upper-outer quadrant of left female breast  Ambulatory referral to Lymphedema Care    Infusion Appointment    CC OFFICE VISIT LONG    DISCONTINUED: sodium chloride 0.9% 250 mL infusion    DISCONTINUED: palonosetron injection 0.25 mg (ALOXI)    DISCONTINUED: dexamethasone 12 mg in sodium chloride 0.9% 25 mL (DECADRON)    DISCONTINUED: fosaprepitant 150 mg in sodium chloride 0.9% 150 mL (EMEND)    DISCONTINUED: pertuzumab 420 mg in sodium chloride 0.9% 250 mL chemo (PERJETA)    DISCONTINUED: trastuzumab 441 mg in sodium chloride 0.9% 250 mL chemo (HERCEPTIN)    DISCONTINUED: DOCEtaxel 130 mg in dextrose 5% (non-PVC) 250 mL chemo (TAXOTERE)    DISCONTINUED: CARBOplatin 620 mg in dextrose 5% 250 mL chemo (PARAPLATIN)    DISCONTINUED: sodium chloride 0.9 % flush 20 mL (NS)    DISCONTINUED: heparin 100 unit/mL lockflush (PF) porcine 300-600 Units    DISCONTINUED: diphenhydrAMINE injection 50 mg (BENADRYL)    DISCONTINUED: famotidine 20 mg/2 mL injection 20 mg (PEPCID)    DISCONTINUED: hydrocortisone sod succ (PF) 100 mg/2 mL injection 100 mg    DISCONTINUED: acetaminophen tablet 1,000 mg (TYLENOL)   2. Recurrent major depressive disorder     3. Anxiety     4. Cough  XR Chest PA and Lateral   5. Lymphedema of left arm  Ambulatory referral to Lymphedema Care      ______________________________________________________________________________    Diagnosis  5/31/17  Stage IIIA (pT2, pN2a, M0) invasive ductal carcinoma of the left breast   - ER (high positive, 80-90%), [place] (high positive, 90%) HER2 (+ by FISH by avg HER2 signals 5.3, HER2/CEP17 ratio 2.3)  - [place] grade 3  - Tumor size: 26 mm  - Margins positive and posterior margin for invasive component and positive for posterior margin for DCIS.  - Angiolymphatic invasion present.  - 5/12 left axillary lymph node positive for carcinoma  - associated DCIS, grade 2, solid and cribriform with focal  comedonecrosis.  20%,      PET scan did not show distant metastases.      Therapy to date:  5/31/17 - left partial mastectomy and Left axillary lymph node dissection  6/23/17- C#1 TCHP; added neulasta support to TCHP starting C#2  Plan is to complete adjuvant chemotherapy then to reevaluate for a reexcision lumpectomy later.    Cardiac MUGA  6/16/17- LVEF 59.4%    History of Present Illness    Professional [place]  was used for this encounter.  She is accompanied by her partner, Jacinda.    She is here for cycle 2 chemotherapy.  She reported she feels much better last week and able to eat well and her appetite was back.  She is anxious and nervous for chemotherapy today.  She has quite limited range of motion in the left and that requesting therapy.  She reported productive cough for about a week without fever.  She felt it was related to weather change.  She denies wheezing no hemoptysis.  She has some shortness of breath.      Apart from describing in HPI and ROS, the remainder of comprehensive ROS was negative.    Pain Status  Currently in Pain: No/denies    Review of Systems    Constitutional  Constitutional (WDL): Exceptions to WDL  Fatigue: Fatigue relieved by rest  Neurosensory  Neurosensory (WDL): Exceptions to WDL  Peripheral Sensory Neuropathy: Asymptomatic, loss of deep tendon reflexes or paresthesia (occasional in fingertips)  Eye   Eye Disorder (WDL): All eye disorder elements are within defined limits  Ear  Ear Disorder (WDL): All ear disorder elements are within defined limits  Cardiovascular  Cardiovascular (WDL): All cardiovascular elements are within defined limits  Pulmonary  Respiratory (WDL): Exceptions to WDL  Cough: Mild symptoms, nonprescription intervention indicated (cold)  Dyspnea: Shortness of breath with moderate exertion  Gastrointestinal  Gastrointestinal (WDL): Exceptions to WDL  Anorexia: Loss of appetite without alteration in eating habits  Diarrhea: Increase of  "<4 stools per day over baseline, mild increase in ostomy output compared to baseline  Dysgeusia: Altered taste but no change in diet  Genitourinary  Genitourinary (WDL): All genitourinary elements are within defined limits  Lymphatic  Lymph (WDL): All lymph disorder elements are within defined limits  Musculoskeletal and Connective Tissue  Musculoskeletal and Connetive Tissue Disorders (WDL): Exceptions to WDL  Integumentary  Integumentary (WDL): All integumentary elements are within defined limits  Patient Coping  Patient Coping: Anxiety  Distress Assessment  Distress Assessment Score: 8 (anticipation of treatment today)  Accompanied by  Accompanied by: Family Member  Oral Chemo Adherence       Past History  Past Medical History:   Diagnosis Date     Anxiety      Breast cancer 05/31/2017    left breast       Physical Exam    Recent Vitals 7/14/2017   Height 5' 1.5\"   Weight 161 lbs 14 oz   BSA (m2) 1.78 m2   /92   Pulse 100   Temp 98.4   Temp src 1   SpO2 97   Some recent data might be hidden     General: alert, awake, not in acute distress  HEENT: Head: Normal, normocephalic, atraumatic.  Eye: Normal external eye, conjunctiva, lids cornea, BALA.  Ears: Non-tender.  Nose: Normal external nose, mucus membranes and septum.  Pharynx: Dental Hygiene adequate. Normal buccal mucosa.  The meatus pharynx but no open ulceration.    Neck / Thyroid: Supple, no masses, nodes, nodules or enlargement.  Lymphatics: No abnormally enlarged lymph nodes.  Chest: Normal chest wall and respirations. Clear to auscultation.  Heart: S1 S2 RRR, no murmur.   Abdomen: abdomen is soft without significant tenderness, masses, organomegaly or guarding  Extremities: normal strength, tone, and muscle mass  Skin: normal. no rash or abnormalities  CNS: non focal.      Lab Results    Recent Results (from the past 168 hour(s))   Magnesium (add-ons/treatment plan)   Result Value Ref Range    Magnesium 2.0 1.8 - 2.6 mg/dL   Comprehensive Metabolic " Panel   Result Value Ref Range    Sodium 140 136 - 145 mmol/L    Potassium 4.3 3.5 - 5.0 mmol/L    Chloride 106 98 - 107 mmol/L    CO2 24 22 - 31 mmol/L    Anion Gap, Calculation 10 5 - 18 mmol/L    Glucose 243 (H) 70 - 125 mg/dL    BUN 20 8 - 22 mg/dL    Creatinine 0.87 0.60 - 1.10 mg/dL    GFR MDRD Af Amer >60 >60 mL/min/1.73m2    GFR MDRD Non Af Amer >60 >60 mL/min/1.73m2    Bilirubin, Total 0.2 0.0 - 1.0 mg/dL    Calcium 9.4 8.5 - 10.5 mg/dL    Protein, Total 7.6 6.0 - 8.0 g/dL    Albumin 3.6 3.5 - 5.0 g/dL    Alkaline Phosphatase 88 45 - 120 U/L    AST 13 0 - 40 U/L    ALT 23 0 - 45 U/L   HM1 (CBC with Diff)   Result Value Ref Range    WBC 12.7 (H) 4.0 - 11.0 thou/uL    RBC 4.08 3.80 - 5.40 mill/uL    Hemoglobin 11.8 (L) 12.0 - 16.0 g/dL    Hematocrit 35.3 35.0 - 47.0 %    MCV 87 80 - 100 fL    MCH 28.9 27.0 - 34.0 pg    MCHC 33.4 32.0 - 36.0 g/dL    RDW 12.7 11.0 - 14.5 %    Platelets 215 140 - 440 thou/uL    MPV 10.3 8.5 - 12.5 fL    Neutrophils % 90 (H) 50 - 70 %    Lymphocytes % 10 (L) 20 - 40 %    Monocytes % 0 (L) 2 - 10 %    Eosinophils % 0 0 - 6 %    Basophils % 0 0 - 2 %    Neutrophils Absolute 11.3 (H) 2.0 - 7.7 thou/uL    Lymphocytes Absolute 1.2 0.8 - 4.4 thou/uL    Monocytes Absolute 0.0 0.0 - 0.9 thou/uL    Eosinophils Absolute 0.0 0.0 - 0.4 thou/uL    Basophils Absolute 0.0 0.0 - 0.2 thou/uL       Imaging    Xr Chest Pa And Lateral    Result Date: 7/14/2017  XR CHEST PA AND LATERAL 7/14/2017 9:44 AM INDICATION: Cough. Undergoing chemotherapy. COMPARISON: None. FINDINGS: The heart size and pulmonary vessels are within normal limits, and the lungs are clear. Right-sided venous access port, with catheter tip near the junction of superior vena cava and right atrium. CONCLUSIONS: No acute findings.    Nm Muga    Result Date: 6/22/2017    The left ventricular ejection fraction is 59.4%.   Normal study.          Signed by: RASTA Bonds

## 2021-06-11 NOTE — PROGRESS NOTES
Met with Linda and her partner in medical oncology.   Again reviewed the steps for [employer] general jose processing and offered to assist with that. Jennifer Ventura RN

## 2021-06-11 NOTE — PROGRESS NOTES
Disability paperwork has been filled out and faxed to [phone]. Forms have been sent to medical records to be scanned.

## 2021-06-12 NOTE — PROGRESS NOTES
"Met with Linda and her partner Jacinda in chemo infusion.  Linda had no success applying for [employer] Patient Assistance Program.  At the time I gave her information in late July, the program was still accepting applications.  They have since closed the application period and expect to reopen on 9/25/17.  Today I gave written information to Linda about the following programs:    [place] [employer] Patient Assistance Program    [facility]    Copied and submitted [employer] bill and bill payment form for Amy King today.  This is in Jacinda's name and I have asked for this to be paid.    Linda mentioned she is interested in food delivery.  We had applied for [employer] when she initially consulted at [facility] .  I called [employer] today and learned Linda was established for delivery at that time.  After she was not at home to accept delivery, they left message asking her to call back for another delivery time, but they did not hear from her and put her services on \"pause.\"  Linda will need to call [employer] to take delivery off pause. She verbalized understanding. Jennifer Ventura RN    "

## 2021-06-12 NOTE — PROGRESS NOTES
[facility] Hematology and Oncology Progress Note    Patient: Linda Canada  MRN: [id number]  Date of Service: 8/4/2017        Reason for Visit    Follow-up breast cancer on adjuvant chemotherapy.    Assessment and Plan  Malignant neoplasm of upper-outer quadrant of left female breast    Staging form: Breast, AJCC 7th Edition    - Clinical: No stage assigned - Unsigned    - Pathologic stage from 6/14/2017: Stage IIIA (T2, N2a, cM0) - Signed by Joie Ferro MD on 6/14/2017    ECOG Performance   ECOG Performance Status: 1     Distress Assessment  Distress Assessment Score: 9    Pain  Currently in Pain: Yes  Pain Score (Initial OR Reassessment): 7  Location: Left breast      # Stage IIIA (pT2, pN2a, M0) invasive ductal carcinoma of the left breast- grade 3, ER (high positive), CT (positive) and HER2 (positive) with associated high-grade DCIS.  Final margins for invasive carcinoma and DCIS are positive.  S/p left breast lumpectomy, left axillary sentinel lymph node biopsy and a left axillary lymph node dissection on 5/31/17.   - She is here today for a second follow-up after first treatment with TCHP.  She is feeling well and labs are adequate. We will add of Neulasta with this treatment to shorten the duration of neutropenia and to lessen  sandy effects from chemotherapy.   -She will follow-up with me in 3 weeks for clinical exam/lab prior to C#3 TCHP.    #. Cough   -No fever.  Due to her ongoing chemotherapy, requested chest x-ray which did not reveal any evidence of consolidation or infiltration.  Continue supportive care with over-the-counter cough medication, plenty of fluid intake.    #.  Left upper extremity restricted range of motion.   -Referral to physical therapy.     #. Depression/anxiety- chronic with recent exacerbation due to recent diagnosis of breast cancer.                 -She will continue to work with her own psychologist at [facility].  She is reminded to take Ativan as needed basis.  She is  also reminded to closely follow-up with them.       Problem List    1. Malignant neoplasm of upper-outer quadrant of left female breast  CC OFFICE VISIT LONG      ______________________________________________________________________________    Diagnosis  5/31/17  Stage IIIA (pT2, pN2a, M0) invasive ductal carcinoma of the left breast   - ER (high positive, 80-90%), MO (high positive, 90%) HER2 (+ by FISH by avg HER2 signals 5.3, HER2/CEP17 ratio 2.3)  - Horacio grade 3  - Tumor size: 26 mm  - Margins positive and posterior margin for invasive component and positive for posterior margin for DCIS.  - Angiolymphatic invasion present.  - 5/12 left axillary lymph node positive for carcinoma  - associated DCIS, grade 2, solid and cribriform with focal comedonecrosis.  20%,      PET scan did not show distant metastases.      Therapy to date:  5/31/17 - left partial mastectomy and Left axillary lymph node dissection  6/23/17- C#1 TCHP; added neulasta support to TCHP starting C#2  Plan is to complete adjuvant chemotherapy then to reevaluate for a reexcision lumpectomy later.    Cardiac MUGA  6/16/17- LVEF 59.4%    History of Present Illness    [employer]  was used for this encounter.  She is accompanied by her partner, Jacinda.    She is here for cycle 2 chemotherapy.  She reported she feels much better last week and able to eat well and her appetite was back.  She is anxious and nervous for chemotherapy today.  She has quite limited range of motion in the left and that requesting therapy.  She reported productive cough for about a week without fever.  She felt it was related to weather change.  She denies wheezing no hemoptysis.  She has some shortness of breath.      Apart from describing in HPI and ROS, the remainder of comprehensive ROS was negative.    Pain Status  Currently in Pain: Yes    Review of Systems    Constitutional  Constitutional (WDL): Exceptions to WDL  Fatigue: Fatigue  "relieved by rest  Fever: None  Chills: Mild sensation of cold, shivering, chattering of teeth  Weight Gain: None  Weight Loss: to <10% from baseline, intervention not indicated (2# )  Neurosensory  Neurosensory (WDL): Exceptions to WDL  Peripheral Motor Neuropathy: None  Ataxia: None  Peripheral Sensory Neuropathy: None  Confusion: None  Syncope: None  Eye   Eye Disorder (WDL): Exceptions to WDL (c/o bilat itching)  Blurred Vision: Intervention not indicated  Dry Eye: None  Eye Pain: None  Watering Eyes: Intervention not indicated  Ear  Ear Disorder (WDL): All ear disorder elements are within defined limits  Cardiovascular  Cardiovascular (WDL): Exceptions to WDL  Palpitations: Definition: A disorder characterized by inflammation of the muscle tissue of the heart. (\"when I have panic episodes\")  Edema: No  Pulmonary  Respiratory (WDL): Exceptions to WDL  Cough: Mild symptoms, nonprescription intervention indicated (dry cough)  Dyspnea: Shortness of breath with moderate exertion  Gastrointestinal  Gastrointestinal (WDL): Exceptions to WDL  Anorexia: Loss of appetite without alteration in eating habits (\"eating better\")  Constipation: None  Diarrhea: None  Dysphagia: None  Esophagitis: None  Nausea: Loss of appetite without alteration in eating habits  Pharyngitis: None  Vomiting: None  Dysgeusia: Altered taste but no change in diet  Dry Mouth: Symptomatic (e.g., dry or thick saliva) without significant dietary alteration, unstimulated saliva flow >0.2 ml/min  Genitourinary  Genitourinary (WDL): All genitourinary elements are within defined limits  Lymphatic  Lymph (WDL): All lymph disorder elements are within defined limits  Musculoskeletal and Connective Tissue  Arthralgia: None  Bone Pain: Mild pain (3-4 days after neulasta-denies now)  Muscle Weakness : Symptomatic, perceived by patient but not evident on physical exam  Myalgia: None  Integumentary  Integumentary (WDL): Exceptions to WDL (noted \"pimple like rash " "to top\")  Alopecia: Hair loss of >50% normal for that individual that is readily apparent to others, a wig or hair piece is necessary if the patient desires to completely camouflage the hair loss, associated with psychosocial impact  Rash Maculo-Papular: Macules/papules covering <10% BSA with or without symptoms (e.g., pruritus, burning, tightness) (top of head)  Pruritus: Mild or localized, topical intervention indicated (top of head)  Patient Coping  Patient Coping: Anxiety  Distress Assessment  Distress Assessment Score: 9  Accompanied by  Accompanied by: Other (Interpretor for partner)  Oral Chemo Adherence       Past History  Past Medical History:   Diagnosis Date     Anxiety      Breast cancer 05/31/2017    left breast       Physical Exam    Recent Vitals 8/4/2017   Height -   Weight 159 lbs 8 oz   BSA (m2) -   /80   Pulse 89   Temp 98.6   Temp src 1   SpO2 98   Some recent data might be hidden     General: alert, awake, not in acute distress  HEENT: Head: Normal, normocephalic, atraumatic.  Eye: Normal external eye, conjunctiva, lids cornea, BALA.  Ears: Non-tender.  Nose: Normal external nose, mucus membranes and septum.  Pharynx: Dental Hygiene adequate. Normal buccal mucosa.  The meatus pharynx but no open ulceration.    Neck / Thyroid: Supple, no masses, nodes, nodules or enlargement.  Lymphatics: No abnormally enlarged lymph nodes.  Chest: Normal chest wall and respirations. Clear to auscultation.  Heart: S1 S2 RRR, no murmur.   Abdomen: abdomen is soft without significant tenderness, masses, organomegaly or guarding  Extremities: normal strength, tone, and muscle mass  Skin: normal. no rash or abnormalities  CNS: non focal.      Lab Results    Recent Results (from the past 168 hour(s))   HM1 (CBC with Diff)   Result Value Ref Range    WBC 15.6 (H) 4.0 - 11.0 thou/uL    RBC 3.77 (L) 3.80 - 5.40 mill/uL    Hemoglobin 10.9 (L) 12.0 - 16.0 g/dL    Hematocrit 32.6 (L) 35.0 - 47.0 %    MCV 87 80 - 100 " fL    MCH 28.9 27.0 - 34.0 pg    MCHC 33.4 32.0 - 36.0 g/dL    RDW 14.8 (H) 11.0 - 14.5 %    Platelets 182 140 - 440 thou/uL    MPV 10.1 8.5 - 12.5 fL    Neutrophils % 89 (H) 50 - 70 %    Lymphocytes % 10 (L) 20 - 40 %    Monocytes % 1 (L) 2 - 10 %    Eosinophils % 0 0 - 6 %    Basophils % 0 0 - 2 %    Neutrophils Absolute 13.6 (H) 2.0 - 7.7 thou/uL    Lymphocytes Absolute 1.5 0.8 - 4.4 thou/uL    Monocytes Absolute 0.2 0.0 - 0.9 thou/uL    Eosinophils Absolute 0.0 0.0 - 0.4 thou/uL    Basophils Absolute 0.0 0.0 - 0.2 thou/uL       Imaging    Xr Chest Pa And Lateral    Result Date: 7/14/2017  XR CHEST PA AND LATERAL 7/14/2017 9:44 AM INDICATION: Cough. Undergoing chemotherapy. COMPARISON: None. FINDINGS: The heart size and pulmonary vessels are within normal limits, and the lungs are clear. Right-sided venous access port, with catheter tip near the junction of superior vena cava and right atrium. CONCLUSIONS: No acute findings.        Signed by: RASTA Bonds

## 2021-06-12 NOTE — PROGRESS NOTES
Pt came into infusion clinic for Day 1, Cycle 4 of her treatment. Labs Reviewed. Medications explained to pt who verbalized understanding. Port patent throughout infusion. Pt tolerated infusion with no complications. Neulasta auto-injector applied. Pt verbalized understanding of this. Pt left infusion clinic via ambulatory.

## 2021-06-12 NOTE — PROGRESS NOTES
[facility] Hematology and Oncology Progress Note    Patient: Linda Canada  MRN: [id number]  Date of Service: 8/25/2017        Reason for Visit    Follow-up breast cancer on adjuvant chemotherapy.    Assessment and Plan  Malignant neoplasm of upper-outer quadrant of left female breast    Staging form: Breast, AJCC 7th Edition    - Clinical: No stage assigned - Unsigned    - Pathologic stage from 6/14/2017: Stage IIIA (T2, N2a, cM0) - Signed by Joie Ferro MD on 6/14/2017    ECOG Performance   ECOG Performance Status: 1     Distress Assessment  Distress Assessment Score: 9 (Income, hasn't been to see therapist, not taking meds)    Pain  Currently in Pain: No/denies  Pain Score (Initial OR Reassessment): No/Denies Pain      # Stage IIIA (pT2, pN2a, M0) invasive ductal carcinoma of the left breast- grade 3, ER (high positive), NH (positive) and HER2 (positive) with associated high-grade DCIS.  Final margins for invasive carcinoma and DCIS are positive.  S/p left breast lumpectomy, left axillary sentinel lymph node biopsy and a left axillary lymph node dissection on 5/31/17.   - She is feeling overall well without any evidence of infections.  Her labs are adequate.  She feels well enough to proceed with cycle #4 chemotherapy.     -She will follow-up with me in 3 weeks for clinical exam/lab prior to C#5 TCHP.   -She will need cardiac MUGA prior to next visit.    #.  Dermatitis on scalp likely secondary to Herceptin   -She has tried skin moisturizer and it was helpful.  She did not take minocycline.  She will continue to do moisturization and topical antibiotic if needed.    #.  Left upper extremity restricted range of motion.   -Undergoing physical therapy.     #. Depression/anxiety- chronic with recent exacerbation due to recent diagnosis of breast cancer.                 -She will continue to work with her own psychologist at [facility].  She is reminded to take Ativan as needed basis.  She has been holding her  antidepressant due to side effects or constipation.  I advised to continue this medication.  She is also reminded to closely follow-up with them.       Problem List    1. Malignant neoplasm of upper-outer quadrant of left female breast  NM [facility]    Infusion Appointment    CC OFFICE VISIT LONG    DISCONTINUED: sodium chloride 0.9% 250 mL infusion    DISCONTINUED: palonosetron injection 0.25 mg (ALOXI)    DISCONTINUED: fosaprepitant 150 mg in sodium chloride 0.9% 150 mL (EMEND)    DISCONTINUED: pertuzumab 420 mg in sodium chloride 0.9% 250 mL chemo (PERJETA)    DISCONTINUED: trastuzumab 440 mg in sodium chloride 0.9% 250 mL chemo (HERCEPTIN)    DISCONTINUED: DOCEtaxel 130 mg in dextrose 5% (non-PVC) 250 mL chemo (TAXOTERE)    DISCONTINUED: CARBOplatin 660 mg in dextrose 5% 250 mL chemo (PARAPLATIN)    DISCONTINUED: pegfilgrastim injection 6 mg (NEULASTIN DELIVERY KIT)    DISCONTINUED: sodium chloride 0.9 % flush 20 mL (NS)    DISCONTINUED: heparin 100 unit/mL lockflush (PF) porcine 300-600 Units    DISCONTINUED: diphenhydrAMINE injection 50 mg (BENADRYL)    DISCONTINUED: famotidine 20 mg/2 mL injection 20 mg (PEPCID)    DISCONTINUED: hydrocortisone sod succ (PF) 100 mg/2 mL injection 100 mg    DISCONTINUED: acetaminophen tablet 1,000 mg (TYLENOL)      ______________________________________________________________________________    Diagnosis  5/31/17  Stage IIIA (pT2, pN2a, M0) invasive ductal carcinoma of the left breast   - ER (high positive, 80-90%), WA (high positive, 90%) HER2 (+ by FISH by avg HER2 signals 5.3, HER2/CEP17 ratio 2.3)  - Horacio grade 3  - Tumor size: 26 mm  - Margins positive and posterior margin for invasive component and positive for posterior margin for DCIS.  - Angiolymphatic invasion present.  - 5/12 left axillary lymph node positive for carcinoma  - associated DCIS, grade 2, solid and cribriform with focal comedonecrosis.  20%,      PET scan did not show distant metastases.      Therapy  to date:  5/31/17 - left partial mastectomy and Left axillary lymph node dissection  6/23/17- C#1 TCHP; added neulasta support to TCHP starting C#2  Plan is to complete adjuvant chemotherapy then to reevaluate for a reexcision lumpectomy later.    Cardiac MUGA  6/16/17- LVEF 59.4%    History of Present Illness    [employer] [place]  was used for this encounter.  She is accompanied by her partner, Jacinda.    She is here for cycle 4 chemotherapy.  She reported depressed mood, worries and stress thinking about her breast cancer.  She also noted progressive fatigue.  Her appetite is fairly good and she has been gaining weight.  She has some hot flashes.  She has constipation and she has not been taking Imodium.  She skipping her antidepressant due to constipation.  She denies any infections.  Overall she is tolerating chemotherapy well.  She has started physical therapy for her left upper extremity and felt it was helpful.    Apart from describing in HPI and ROS, the remainder of comprehensive ROS was negative.    Pain Status  Currently in Pain: No/denies    Review of Systems    Constitutional  Constitutional (WDL): Exceptions to WDL  Fatigue: Fatigue relieved by rest (Fatigue worse Post TX)  Chills: Mild sensation of cold, shivering, chattering of teeth  Neurosensory  Neurosensory (WDL): Exceptions to WDL  Eye   Eye Disorder (WDL): Exceptions to WDL  Blurred Vision: Intervention not indicated (blurry in the morning)  Watering Eyes: Intervention not indicated  Ear  Ear Disorder (WDL): Exceptions to WDL (thinks she might not be hearing as well)  Cardiovascular  Cardiovascular (WDL): Exceptions to WDL  Palpitations: Definition: A disorder characterized by inflammation of the muscle tissue of the heart. (with panic attacks)  Pulmonary  Respiratory (WDL): Exceptions to WDL  Dyspnea: Shortness of breath with moderate exertion (with panic attacks)  Gastrointestinal  Gastrointestinal (WDL): Exceptions to  WDL  Anorexia: Loss of appetite without alteration in eating habits (appetite is better, stomach feels empty)  Constipation: Occasional or intermittent symptoms, occasional use of stool softeners, laxatives, dietary modification, or enema (LBM yesterday)  Nausea: Loss of appetite without alteration in eating habits  Dysgeusia: Altered taste but no change in diet  Dry Mouth: Symptomatic (e.g., dry or thick saliva) without significant dietary alteration, unstimulated saliva flow >0.2 ml/min  Genitourinary  Genitourinary (WDL): All genitourinary elements are within defined limits  Lymphatic  Lymph (WDL): All lymph disorder elements are within defined limits  Musculoskeletal and Connective Tissue  Musculoskeletal and Connetive Tissue Disorders (WDL): Exceptions to WDL  Muscle Weakness : Symptomatic, perceived by patient but not evident on physical exam  Integumentary  Integumentary (WDL): Exceptions to WDL  Alopecia: Hair loss of >50% normal for that individual that is readily apparent to others, a wig or hair piece is necessary if the patient desires to completely camouflage the hair loss, associated with psychosocial impact  Palmar-Plantar Erythrodysesthesia Syndrome: Minimal skin changes or dermatitis (e.g., erythema, edema, or hyperkeratosis) without pain (Nail changes)  Patient Coping  Patient Coping: Anxiety;Open/discussion  Distress Assessment  Distress Assessment Score: 9 (Income, hasn't been to see therapist, not taking meds)  Accompanied by  Accompanied by: Family Member  Oral Chemo Adherence       Past History  Past Medical History:   Diagnosis Date     Anxiety      Breast cancer 05/31/2017    left breast       Physical Exam    Recent Vitals 8/25/2017   Height -   Weight 160 lbs 8 oz   BSA (m2) -   /76   Pulse 97   Temp 98   Temp src 1   SpO2 98   Some recent data might be hidden     General: alert, awake, not in acute distress  HEENT: Head: Normal, normocephalic, atraumatic.  Eye: Normal external eye,  conjunctiva, lids cornea, BALA.  Ears: Non-tender.  Nose: Normal external nose, mucus membranes and septum.  Pharynx: Dental Hygiene adequate. Normal buccal mucosa.  The meatus pharynx but no open ulceration.    Neck / Thyroid: Supple, no masses, nodes, nodules or enlargement.  Lymphatics: No abnormally enlarged lymph nodes.  Chest: Normal chest wall and respirations. Clear to auscultation.  Heart: S1 S2 RRR, no murmur.   Abdomen: abdomen is soft without significant tenderness, masses, organomegaly or guarding  Extremities: normal strength, tone, and muscle mass  Skin: normal. Crusted papular rash on scalp.  CNS: non focal.      Lab Results    Recent Results (from the past 168 hour(s))   Magnesium (add-ons/treatment plan)   Result Value Ref Range    Magnesium 1.9 1.8 - 2.6 mg/dL   Comprehensive Metabolic Panel   Result Value Ref Range    Sodium 140 136 - 145 mmol/L    Potassium 3.8 3.5 - 5.0 mmol/L    Chloride 108 (H) 98 - 107 mmol/L    CO2 22 22 - 31 mmol/L    Anion Gap, Calculation 10 5 - 18 mmol/L    Glucose 179 (H) 70 - 125 mg/dL    BUN 20 8 - 22 mg/dL    Creatinine 0.80 0.60 - 1.10 mg/dL    GFR MDRD Af Amer >60 >60 mL/min/1.73m2    GFR MDRD Non Af Amer >60 >60 mL/min/1.73m2    Bilirubin, Total 0.3 0.0 - 1.0 mg/dL    Calcium 10.1 8.5 - 10.5 mg/dL    Protein, Total 7.3 6.0 - 8.0 g/dL    Albumin 4.0 3.5 - 5.0 g/dL    Alkaline Phosphatase 97 45 - 120 U/L    AST 17 0 - 40 U/L    ALT 28 0 - 45 U/L   HM1 (CBC with Diff)   Result Value Ref Range    WBC 14.6 (H) 4.0 - 11.0 thou/uL    RBC 3.56 (L) 3.80 - 5.40 mill/uL    Hemoglobin 10.6 (L) 12.0 - 16.0 g/dL    Hematocrit 31.3 (L) 35.0 - 47.0 %    MCV 88 80 - 100 fL    MCH 29.8 27.0 - 34.0 pg    MCHC 33.9 32.0 - 36.0 g/dL    RDW 17.2 (H) 11.0 - 14.5 %    Platelets 182 140 - 440 thou/uL    MPV 10.4 8.5 - 12.5 fL    Neutrophils % 89 (H) 50 - 70 %    Lymphocytes % 10 (L) 20 - 40 %    Monocytes % 1 (L) 2 - 10 %    Eosinophils % 0 0 - 6 %    Basophils % 0 0 - 2 %    Neutrophils  Absolute 12.8 (H) 2.0 - 7.7 thou/uL    Lymphocytes Absolute 1.4 0.8 - 4.4 thou/uL    Monocytes Absolute 0.2 0.0 - 0.9 thou/uL    Eosinophils Absolute 0.0 0.0 - 0.4 thou/uL    Basophils Absolute 0.0 0.0 - 0.2 thou/uL       Imaging    No results found.      Signed by: Joie Ferro, MDA

## 2021-06-12 NOTE — PROGRESS NOTES
[facility] Hematology and Oncology Progress Note    Patient: Linda Canada  MRN: [id number]  Date of Service: 8/4/2017        Reason for Visit    Follow-up breast cancer on adjuvant chemotherapy.    Assessment and Plan  Malignant neoplasm of upper-outer quadrant of left female breast    Staging form: Breast, AJCC 7th Edition    - Clinical: No stage assigned - Unsigned    - Pathologic stage from 6/14/2017: Stage IIIA (T2, N2a, cM0) - Signed by Joie Ferro MD on 6/14/2017    ECOG Performance   ECOG Performance Status: 1     Distress Assessment  Distress Assessment Score: 9    Pain  Currently in Pain: Yes  Pain Score (Initial OR Reassessment): 7  Location: Left breast      # Stage IIIA (pT2, pN2a, M0) invasive ductal carcinoma of the left breast- grade 3, ER (high positive), [place] (positive) and HER2 (positive) with associated high-grade DCIS.  Final margins for invasive carcinoma and DCIS are positive.  S/p left breast lumpectomy, left axillary sentinel lymph node biopsy and a left axillary lymph node dissection on 5/31/17.   - She is here today for a second follow-up after first treatment with TCHP.  She is feeling well and labs are adequate.  She felt additional Neulasta helped, and we will continue Neulasta.    -She will follow-up with me in 3 weeks for clinical exam/lab prior to C#4 TCHP.    #.  Several acneiform rash on scal/dermatitis likely secondary to Herceptin   -Minocycline twice daily for 21 days.  We could consider topical antibiotic if needed.  Will reassess in next visit.    #.  Bone aches after Neulasta   -Claritin 10 mg once daily for a couple of days after last treatment.    #.  Left upper extremity restricted range of motion.   -Referral to physical therapy and she has an appointment in the next few weeks.     #. Depression/anxiety-stable.                 -She will continue to work with her own psychologist at [facility].  She is reminded to take Ativan as needed basis.  She is closely  followed-up with them.       Problem List    1. Malignant neoplasm of upper-outer quadrant of left female breast  CC OFFICE VISIT LONG      ______________________________________________________________________________    Diagnosis  5/31/17  Stage IIIA (pT2, pN2a, M0) invasive ductal carcinoma of the left breast   - ER (high positive, 80-90%), [place] (high positive, 90%) HER2 (+ by FISH by avg HER2 signals 5.3, HER2/CEP17 ratio 2.3)  - [place] grade 3  - Tumor size: 26 mm  - Margins positive and posterior margin for invasive component and positive for posterior margin for DCIS.  - Angiolymphatic invasion present.  - 5/12 left axillary lymph node positive for carcinoma  - associated DCIS, grade 2, solid and cribriform with focal comedonecrosis.  20%,      PET scan did not show distant metastases.      Therapy to date:  5/31/17 - left partial mastectomy and Left axillary lymph node dissection  6/23/17- C#1 TCHP; added neulasta support to TCHP starting C#2  Plan is to complete adjuvant chemotherapy then to reevaluate for a reexcision lumpectomy later.    Cardiac MUGA  6/16/17- LVEF 59.4%    History of Present Illness    Professional [place]  was used for this encounter.  She is accompanied by her partner, Jacinda.    She is here for cycle 3 chemotherapy.  She reported she continues to have fatigue, chills and watery eye.  Compared to our first treatment, she felt less side effects and showed a duration of side effects after the second treatment.  She felt Neulasta was helpful.  She noted pimples on her head they are itchy.  She tried to use topical steroid and did not help much.  She also complained of left chest wall tightness and discomfort with movement.  She has been seeing a physical therapy as her first appointment is not until next few weeks.  Otherwise she is doing fairly good.  She lost about 2 pounds from last visit.  She is trying to eat.  She has poor appetite and taste changes.     Apart  "from describing in HPI and ROS, the remainder of comprehensive ROS was negative.    Pain Status  Currently in Pain: Yes    Review of Systems    Constitutional  Constitutional (WDL): Exceptions to WDL  Fatigue: Fatigue relieved by rest  Fever: None  Chills: Mild sensation of cold, shivering, chattering of teeth  Weight Gain: None  Weight Loss: to <10% from baseline, intervention not indicated (2# )  Neurosensory  Neurosensory (WDL): Exceptions to WDL  Peripheral Motor Neuropathy: None  Ataxia: None  Peripheral Sensory Neuropathy: None  Confusion: None  Syncope: None  Eye   Eye Disorder (WDL): Exceptions to WDL (c/o bilat itching)  Blurred Vision: Intervention not indicated  Dry Eye: None  Eye Pain: None  Watering Eyes: Intervention not indicated  Ear  Ear Disorder (WDL): All ear disorder elements are within defined limits  Cardiovascular  Cardiovascular (WDL): Exceptions to WDL  Palpitations: Definition: A disorder characterized by inflammation of the muscle tissue of the heart. (\"when I have panic episodes\")  Edema: No  Pulmonary  Respiratory (WDL): Exceptions to WDL  Cough: Mild symptoms, nonprescription intervention indicated (dry cough)  Dyspnea: Shortness of breath with moderate exertion  Gastrointestinal  Gastrointestinal (WDL): Exceptions to WDL  Anorexia: Loss of appetite without alteration in eating habits (\"eating better\")  Constipation: None  Diarrhea: None  Dysphagia: None  Esophagitis: None  Nausea: Loss of appetite without alteration in eating habits  Pharyngitis: None  Vomiting: None  Dysgeusia: Altered taste but no change in diet  Dry Mouth: Symptomatic (e.g., dry or thick saliva) without significant dietary alteration, unstimulated saliva flow >0.2 ml/min  Genitourinary  Genitourinary (WDL): All genitourinary elements are within defined limits  Lymphatic  Lymph (WDL): All lymph disorder elements are within defined limits  Musculoskeletal and Connective Tissue  Arthralgia: None  Bone Pain: Mild pain " "(3-4 days after neulasta-denies now)  Muscle Weakness : Symptomatic, perceived by patient but not evident on physical exam  Myalgia: None  Integumentary  Integumentary (WDL): Exceptions to WDL (noted \"pimple like rash to top\")  Alopecia: Hair loss of >50% normal for that individual that is readily apparent to others, a wig or hair piece is necessary if the patient desires to completely camouflage the hair loss, associated with psychosocial impact  Rash Maculo-Papular: Macules/papules covering <10% BSA with or without symptoms (e.g., pruritus, burning, tightness) (top of head)  Pruritus: Mild or localized, topical intervention indicated (top of head)  Patient Coping  Patient Coping: Anxiety  Distress Assessment  Distress Assessment Score: 9  Accompanied by  Accompanied by: Other (Interpretor for partner)  Oral Chemo Adherence       Past History  Past Medical History:   Diagnosis Date     Anxiety      Breast cancer 05/31/2017    left breast       Physical Exam    Recent Vitals 8/4/2017   Height -   Weight 159 lbs 8 oz   BSA (m2) -   /80   Pulse 89   Temp 98.6   Temp src 1   SpO2 98   Some recent data might be hidden     General: alert, awake, not in acute distress  HEENT: Head: Normal, normocephalic, atraumatic.  Eye: Normal external eye, conjunctiva, lids cornea, BALA.  Ears: Non-tender.  Nose: Normal external nose, mucus membranes and septum.  Pharynx: Dental Hygiene adequate. Normal buccal mucosa.  The meatus pharynx but no open ulceration.    Neck / Thyroid: Supple, no masses, nodes, nodules or enlargement.  Lymphatics: No abnormally enlarged lymph nodes.  Chest: Normal chest wall and respirations. Clear to auscultation.  Heart: S1 S2 RRR, no murmur.   Abdomen: abdomen is soft without significant tenderness, masses, organomegaly or guarding  Extremities: normal strength, tone, and muscle mass  Skin: normal. no rash or abnormalities.  Nailbed showed darkening of skin.  Several papular erythematous rash on " scalp with prescription scratch marks.  CNS: non focal.      Lab Results    Recent Results (from the past 168 hour(s))   HM1 (CBC with Diff)   Result Value Ref Range    WBC 15.6 (H) 4.0 - 11.0 thou/uL    RBC 3.77 (L) 3.80 - 5.40 mill/uL    Hemoglobin 10.9 (L) 12.0 - 16.0 g/dL    Hematocrit 32.6 (L) 35.0 - 47.0 %    MCV 87 80 - 100 fL    MCH 28.9 27.0 - 34.0 pg    MCHC 33.4 32.0 - 36.0 g/dL    RDW 14.8 (H) 11.0 - 14.5 %    Platelets 182 140 - 440 thou/uL    MPV 10.1 8.5 - 12.5 fL    Neutrophils % 89 (H) 50 - 70 %    Lymphocytes % 10 (L) 20 - 40 %    Monocytes % 1 (L) 2 - 10 %    Eosinophils % 0 0 - 6 %    Basophils % 0 0 - 2 %    Neutrophils Absolute 13.6 (H) 2.0 - 7.7 thou/uL    Lymphocytes Absolute 1.5 0.8 - 4.4 thou/uL    Monocytes Absolute 0.2 0.0 - 0.9 thou/uL    Eosinophils Absolute 0.0 0.0 - 0.4 thou/uL    Basophils Absolute 0.0 0.0 - 0.2 thou/uL       Imaging    Xr Chest Pa And Lateral    Result Date: 7/14/2017  XR CHEST PA AND LATERAL 7/14/2017 9:44 AM INDICATION: Cough. Undergoing chemotherapy. COMPARISON: None. FINDINGS: The heart size and pulmonary vessels are within normal limits, and the lungs are clear. Right-sided venous access port, with catheter tip near the junction of superior vena cava and right atrium. CONCLUSIONS: No acute findings.        Signed by: RASTA Bonds

## 2021-06-12 NOTE — PROGRESS NOTES
Linda arrived A&Ox4 ambulatory and stable, accompanied by family. Port was accessed and labs drawn prior to clinic appt.  present. Pt confirms she is here for D1 C3 of treatment and that with her last tx her IV dexamethasone was held. It is in todays therapy plan and Linda states she has take Dexamethasone oral as directed.   Dexamethasone IV held per Sonya; pt is taking oral dexamethasone day before, day of and day after chemo and Sonya stated she will remove Dexamethasone from Treatment plan for future.  All medications reviewed prior to infusion. Linda tolerated treatment w/o sxs adverse Rxn. Line flushed NS and pt monitored x30min post infusion. Port flushed, heparinized and dc'd.  Neulasta injector applied; pt education reviewed.  Linda dc'd A&OX4 ambulatory and stable, accompanied by family.

## 2021-06-13 NOTE — PROGRESS NOTES
"[facility] Daily Progress     Patient Name: Linda Canada  Date: 2017  Visit #: 7/10  PTA visit #:  2    Date of evaluation: 10/10/2017  Referral Diagnosis: left shoulder decreased ROM  Referring provider: Margaret Domingo  Visit Diagnosis:     ICD-10-CM    1. 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 Malignant neoplasm of upper-outer quadrant of left breast in female, estrogen receptor positive C50.412     Z17.0    3. Contracture, left shoulder M24.512    4. Scar condition and fibrosis of skin L90.5          Assessment:   Pt was able to perform strengthening exercises without increased symptoms.    Patient is benefitting from skilled physical therapy and is making steady progress toward functional goals.  Patient is appropriate to continue with skilled physical therapy intervention, as indicated by initial plan of care.    Goal Status: On going  Pt. will be independent with home exercise program in : 4 weeks;Progressing toward  Patient will reach / maintain arm movement: forward;overhead;behind;for dressing;Progressing toward  Patient will have decreased fibrosis, scar tissue for improved lymphatic mobilitiy : in 4 weeks;Progressing toward  Patient will perform, verbalize self-management of: skin care;self-monitoring;exercise;massage;infection prevention;in 4 weeks;Progressing toward    Plan / Patient Education:     Continue with initial plan of care.  Progress with home program as tolerated. MFR, nerve glides, therapeutic exercises, stretching and strengthening    Subjective:     Pain Ratin  Pt reports she continues to feel tight, but is 60% better since SOC  She feels therapy has really helped, but is more achey this last week  Pt starts radiation therapy soon    Objective:     Caregiver present: No    Left shoulder ROM:  Flexion: 142 degrees \"tight\" end feel  AB: 145 degrees \"tight\" end feel  Observation of swelling: left upper extremity is better per pt  Volume measurements taken:  " "No      Skin condition is:  Intact, dry.    Compression:  Garment(s) worn to treatment.One piece arm sleeve.    Medication for infection:      Treatment Today     TREATMENT MINUTES COMMENTS   Evaluation     Self-care/ Home management 5 Discussed how to manage increased episodes of achiness, general self care   Manual therapy 35 Position supine:  Myofascial release L shoulder and periscapular region, teres major, pec minor, upper trap.  Cervical paraspinals, scalenes, SCM, gentle  L arm pull , assisted stretching           Neuromuscular Re-education     Therapeutic Activity     Therapeutic Exercises 15 Exercises:  Exercise #1: supine pectoral  stretch, latissimus dorsi, breathing.Reviewed, performed with the therapist and on her own. Pt to use pillow under arm for increased comfort  Comment #1: seated shoulder rolls, nerve glides, provided written instructions.  Exercise #2: nu step level 1 x 5'minutes, seat 6, patient wears her compression sleeve during exercises.  Exercise #3: scap retraction 5\" x 10  Comment #3: B shoulder ER with L1 band x 10-30 reps, sleeve on      Gait training     Modality__________________                Total 55    Blank areas are intentional and mean the treatment did not include these items.       Nuria Lr PTA,CLT  11/6/2017    "

## 2021-06-13 NOTE — PROGRESS NOTES
[facility] Hematology and Oncology Progress Note    Patient: Linda Canada  MRN: [id number]  Date of Service: 9/15/2017        Reason for Visit    Follow-up breast cancer on adjuvant chemotherapy.    Assessment and Plan  Malignant neoplasm of upper-outer quadrant of left female breast    Staging form: Breast, AJCC 7th Edition    - Clinical: No stage assigned - Unsigned    - Pathologic stage from 6/14/2017: Stage IIIA (T2, N2a, cM0) - Signed by Joie Ferro MD on 6/14/2017    ECOG Performance         Distress Assessment  Distress Assessment Score: 5    Pain  Currently in Pain: Yes  Pain Score (Initial OR Reassessment): 1      # Stage IIIA (pT2, pN2a, M0) invasive ductal carcinoma of the left breast- grade 3, ER (high positive), [place] (positive) and HER2 (positive) with associated high-grade DCIS.  Final margins for invasive carcinoma and DCIS are positive.  S/p left breast lumpectomy, left axillary sentinel lymph node biopsy and a left axillary lymph node dissection on 5/31/17.   - She is feeling overall well without any evidence of infections.  Her labs are adequate.  She feels well enough to proceed with cycle #5 chemotherapy.     -She will follow-up with me in 3 weeks for clinical exam/lab prior to C#6 TCHP.   -She will get a cardiac MUGA today.   -She will be follow-up with Dr. Kwong after next cycle of chemotherapy.  She is worried about going another excision but I deferred this to discuss with Dr. Kwong.     - I explained to her that she will need radiation therapy.  I went over again the overall treatment plan of continuing Herceptin to complete one year of therapy.  She will also need endocrine therapy at the end of radiation for 5 years.    #.  Left upper extremity restricted range of motion.   - She will start physical therapy next week.     #. Depression/anxiety- chronic with recent exacerbation due to recent diagnosis of breast cancer.                 -She will continue to work with her own psychologist  at [facility].  Medication adjustment has been made.  She will continue to work with her primary care provider and psychologist.  I offered her our psychologist if she needed additional help.  She voiced understanding.    #.  She requested nutrition consult and completed.    Problem List    1. Malignant neoplasm of upper-outer quadrant of left female breast  CC OFFICE VISIT LONG    Ambulatory referral to Oncology Psychotherapist    Infusion Appointment    CC OFFICE VISIT LONG    Ambulatory referral to Nutrition Services    DISCONTINUED: sodium chloride 0.9% 250 mL infusion    DISCONTINUED: palonosetron injection 0.25 mg (ALOXI)    DISCONTINUED: fosaprepitant 150 mg in sodium chloride 0.9% 150 mL (EMEND)    DISCONTINUED: pertuzumab 420 mg in sodium chloride 0.9% 250 mL chemo (PERJETA)    DISCONTINUED: trastuzumab 430 mg in sodium chloride 0.9% 250 mL chemo (HERCEPTIN)    DISCONTINUED: DOCEtaxel 130 mg in dextrose 5% (non-PVC) 250 mL chemo (TAXOTERE)    DISCONTINUED: CARBOplatin 670 mg in dextrose 5% 250 mL chemo (PARAPLATIN)    DISCONTINUED: pegfilgrastim injection 6 mg (NEULASTIN DELIVERY KIT)    DISCONTINUED: sodium chloride 0.9 % flush 20 mL (NS)    DISCONTINUED: heparin 100 unit/mL lockflush (PF) porcine 300-600 Units    DISCONTINUED: diphenhydrAMINE injection 50 mg (BENADRYL)    DISCONTINUED: famotidine 20 mg/2 mL injection 20 mg (PEPCID)    DISCONTINUED: hydrocortisone sod succ (PF) 100 mg/2 mL injection 100 mg    DISCONTINUED: acetaminophen tablet 1,000 mg (TYLENOL)      ______________________________________________________________________________    Diagnosis  5/31/17  Stage IIIA (pT2, pN2a, M0) invasive ductal carcinoma of the left breast   - ER (high positive, 80-90%), [place] (high positive, 90%) HER2 (+ by FISH by avg HER2 signals 5.3, HER2/CEP17 ratio 2.3)  - [place] grade 3  - Tumor size: 26 mm  - Margins positive and posterior margin for invasive component and positive for posterior margin for  DCIS.  - Angiolymphatic invasion present.  - 5/12 left axillary lymph node positive for carcinoma  - associated DCIS, grade 2, solid and cribriform with focal comedonecrosis.  20%,      PET scan did not show distant metastases.      Therapy to date:  5/31/17 - left partial mastectomy and Left axillary lymph node dissection  6/23/17- C#1 TCHP; added neulasta support to TCHP starting C#2  Plan is to complete adjuvant chemotherapy then to reevaluate for a reexcision lumpectomy later.    Cardiac MUGA  6/16/17- LVEF 59.4%  9/15/17-     History of Present Illness    Professional [place]  was used for this encounter.  She is accompanied by her partner, Jacinda.    She is here for cycle 5 chemotherapy.  She continues struggling with depression, anxiety.  She went to see her primary care provider and scheduled to see her psychotherapist in early October.  They started her on Valium.  She denies any suicidal or homicidal ideation.  Couple days ago she noted sudden pain in her left breast then 1 week.  She denies any other symptoms of bone pain.  No headaches.  She has blurry vision.  Runny nose.  No other intolerable side effects from chemotherapy.  Apart from describing in HPI and ROS, the remainder of comprehensive ROS was negative.    Pain Status  Currently in Pain: Yes    Review of Systems    Constitutional  Constitutional (WDL): Exceptions to WDL  Fatigue: Fatigue relieved by rest  Neurosensory  Neurosensory (WDL): Exceptions to WDL  Peripheral Motor Neuropathy: Asymptomatic, clinical or diagnostic observations only, intervention not indicated (fingers)  Eye   Eye Disorder (WDL): Exceptions to WDL  Blurred Vision: Intervention not indicated  Watering Eyes: Intervention not indicated (itchy)  Ear  Ear Disorder (WDL): Exceptions to WDL (itching)  Cardiovascular  Cardiovascular (WDL): All cardiovascular elements are within defined limits  Pulmonary  Respiratory (WDL): Exceptions to WDL  Dyspnea:  Shortness of breath with moderate exertion  Gastrointestinal  Gastrointestinal (WDL): Exceptions to WDL  Anorexia: Loss of appetite without alteration in eating habits  Constipation: Occasional or intermittent symptoms, occasional use of stool softeners, laxatives, dietary modification, or enema  Dysgeusia: Altered taste but no change in diet  Genitourinary  Genitourinary (WDL): All genitourinary elements are within defined limits  Lymphatic  Lymph (WDL): All lymph disorder elements are within defined limits  Musculoskeletal and Connective Tissue  Musculoskeletal and Connetive Tissue Disorders (WDL): Exceptions to WDL  Muscle Weakness : Symptomatic, perceived by patient but not evident on physical exam  Integumentary  Integumentary (WDL): Exceptions to WDL  Alopecia: Hair loss of >50% normal for that individual that is readily apparent to others, a wig or hair piece is necessary if the patient desires to completely camouflage the hair loss, associated with psychosocial impact  Patient Coping  Patient Coping: Anxiety;Depression  Distress Assessment  Distress Assessment Score: 5  Accompanied by  Accompanied by: Family Member  Oral Chemo Adherence       Past History  Past Medical History:   Diagnosis Date     Anxiety      Breast cancer 05/31/2017    left breast       Physical Exam    Recent Vitals 9/15/2017   Height -   Weight 158 lbs 11 oz   BSA (m2) -   /70   Pulse 95   Temp 97.9   Temp src 1   SpO2 98   Some recent data might be hidden     General: alert, awake, not in acute distress  HEENT: Head: Normal, normocephalic, atraumatic.  Eye: Normal external eye, conjunctiva, lids cornea, BALA.  Ears: Non-tender.  Nose: Normal external nose, mucus membranes and septum.  Pharynx: Dental Hygiene adequate. Normal buccal mucosa.  The meatus pharynx but no open ulceration.    Neck / Thyroid: Supple, no masses, nodes, nodules or enlargement.  Lymphatics: No abnormally enlarged lymph nodes.  Chest: Normal chest wall and  respirations. Clear to auscultation.  Breast: Left breast showed well-healed scar with some skin induration as expected.  Limited range of motion in the left shoulder.  No palpable adenopathy no masses.  Heart: S1 S2 RRR, no murmur.   Abdomen: abdomen is soft without significant tenderness, masses, organomegaly or guarding  Extremities: normal strength, tone, and muscle mass  Skin: normal. Crusted papular rash on scalp.  CNS: non focal.      Lab Results    Recent Results (from the past 168 hour(s))   Magnesium (add-ons/treatment plan)   Result Value Ref Range    Magnesium 1.7 (L) 1.8 - 2.6 mg/dL   Comprehensive Metabolic Panel   Result Value Ref Range    Sodium 142 136 - 145 mmol/L    Potassium 4.0 3.5 - 5.0 mmol/L    Chloride 110 (H) 98 - 107 mmol/L    CO2 24 22 - 31 mmol/L    Anion Gap, Calculation 8 5 - 18 mmol/L    Glucose 160 (H) 70 - 125 mg/dL    BUN 20 8 - 22 mg/dL    Creatinine 0.77 0.60 - 1.10 mg/dL    GFR MDRD Af Amer >60 >60 mL/min/1.73m2    GFR MDRD Non Af Amer >60 >60 mL/min/1.73m2    Bilirubin, Total 0.3 0.0 - 1.0 mg/dL    Calcium 9.7 8.5 - 10.5 mg/dL    Protein, Total 7.1 6.0 - 8.0 g/dL    Albumin 3.8 3.5 - 5.0 g/dL    Alkaline Phosphatase 86 45 - 120 U/L    AST 14 0 - 40 U/L    ALT 20 0 - 45 U/L   HM1 (CBC with Diff)   Result Value Ref Range    WBC 12.3 (H) 4.0 - 11.0 thou/uL    RBC 3.31 (L) 3.80 - 5.40 mill/uL    Hemoglobin 9.9 (L) 12.0 - 16.0 g/dL    Hematocrit 29.9 (L) 35.0 - 47.0 %    MCV 90 80 - 100 fL    MCH 29.9 27.0 - 34.0 pg    MCHC 33.1 32.0 - 36.0 g/dL    RDW 18.6 (H) 11.0 - 14.5 %    Platelets 127 (L) 140 - 440 thou/uL    MPV 10.4 8.5 - 12.5 fL    Neutrophils % 88 (H) 50 - 70 %    Lymphocytes % 10 (L) 20 - 40 %    Monocytes % 2 2 - 10 %    Eosinophils % 0 0 - 6 %    Basophils % 0 0 - 2 %    Neutrophils Absolute 10.7 (H) 2.0 - 7.7 thou/uL    Lymphocytes Absolute 1.2 0.8 - 4.4 thou/uL    Monocytes Absolute 0.3 0.0 - 0.9 thou/uL    Eosinophils Absolute 0.0 0.0 - 0.4 thou/uL    Basophils  Absolute 0.0 0.0 - 0.2 thou/uL       Imaging    No results found.      Signed by: Joie Ferro, RASTA

## 2021-06-13 NOTE — PROGRESS NOTES
Linda came to chemo infusion after labs and MD visit this morning for cycle 6 using Pertuzumab, Trastuzumab, Docetaxel and Carboplatin.  Port maintained excellent blood return throughout treatment. She received treatment as ordered and tolerated it well while in clinic today.  She was worried as she had what she thought was a anxiety attack after last cycle.  For this reason she has not taken her dexamethasone and this was discussed with Dr Ferro.  Pt will take compazine for nausea as needed instead of dex.  Pt was observed for 45 minutes post infusion and did not have any signs or reaction or anxiety.  Port was flushed, heparinized then deaccessed and site covered. She received her Neulasta on body injector and care of this was reviewed.  Pt verbalizes good understanding.  Linda d/c from clinic ambulatory accompanied by her partner. She is aware of her future appointment.

## 2021-06-13 NOTE — PROGRESS NOTES
"[facility] Daily Progress     Patient Name: Linda Canada  Date: 10/24/2017  Visit #: 2  PTA visit #:  -    Date of evaluation: 10/10/2017  Referral Diagnosis: left shoulder decreased ROM  Referring provider: Margaret Domingo  Visit Diagnosis:     ICD-10-CM    1. Contracture, left shoulder M24.512    2. Post-mastectomy lymphedema syndrome I97.2    3. Scar condition and fibrosis of skin L90.5          Assessment:     Left shoulder ROM is improved.  Left upper extremity skin is intact.  Left pectoralis and upper trap with tenderness    Patient is benefitting from skilled physical therapy and is making steady progress toward functional goals.  Patient is appropriate to continue with skilled physical therapy intervention, as indicated by initial plan of care.    Goal Status:  Pt. will be independent with home exercise program in : 4 weeks;Progressing toward  Patient will reach / maintain arm movement: forward;overhead;behind;for dressing;in 4 weeks;Progressing toward  Patient will have decreased fibrosis, scar tissue for improved lymphatic mobilitiy : in 4 weeks;Progressing toward  Patient will perform, verbalize self-management of: skin care;self-monitoring;exercise;massage;infection prevention;in 4 weeks;Progressing toward    Plan / Patient Education:     Continue with initial plan of care.  Progress with home program as tolerated. MFR, nerve glides, therapeutic exercises    Subjective:     Pain Ratin  \"Left arm pulling\"  \"very happy because because she is done with chemo, just need infusions and radiation\".        Objective:     Caregiver present: No    Left shoulder abduction to 95o active and flexion 98o active.  Observation of swelling: left upper extremity is better.     Volume measurements taken:  No      Skin condition is:  better    Compression:  Garment(s) worn to treatment.    Medication for infection:  No    Treatment Today     TREATMENT MINUTES COMMENTS   Evaluation     Self-care/ Home " management     Manual therapy 44 Position supine:  Myofascial release (layers 1-3): Left: teres major, pec minor, upper trap. Bilateral: Cervical paraspinals, scalenes, SCM.  -left side-glides of C3-C6 grade 3     Vertical thoracic release  Pectoral release.Left Axilla with tissue bending techniques.     Neuromuscular Re-education     Therapeutic Activity     Therapeutic Exercises 15 Exercises:  Exercise #1: supine pectoral  stretch, latissimus dorsi, breathing.  Comment #1: seated shoulder rolls, nerve glides, provided written instructions.     Gait training     Modality__________________                Total 59    Blank areas are intentional and mean the treatment did not include these items.       Chitra Palomino PT, CLT-ROBERT  10/24/2017

## 2021-06-13 NOTE — PROGRESS NOTES
"[facility] Daily Progress     Patient Name: Linda Canada  Date: 10/31/2017  Visit #: 4  PTA visit #:  -    Date of evaluation: 10/10/2017  Referral Diagnosis: left shoulder decreased ROM  Referring provider: Margaret Domingo  Visit Diagnosis:     ICD-10-CM    1. 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 Contracture, left shoulder M24.512    3. Scar condition and fibrosis of skin L90.5          Assessment:     Increased left shoulder ROM.  Left axilla with c/o pulling.    Patient is benefitting from skilled physical therapy and is making steady progress toward functional goals.  Patient is appropriate to continue with skilled physical therapy intervention, as indicated by initial plan of care.    Goal Status:  Pt. will be independent with home exercise program in : 4 weeks;Progressing toward  Patient will reach / maintain arm movement: forward;overhead;behind;for dressing;in 4 weeks;Progressing toward  Patient will have decreased fibrosis, scar tissue for improved lymphatic mobilitiy : in 4 weeks;Progressing toward  Patient will perform, verbalize self-management of: skin care;self-monitoring;exercise;massage;infection prevention;in 4 weeks;Progressing toward    Plan / Patient Education:     Continue with initial plan of care.  Progress with home program as tolerated. MFR, nerve glides, therapeutic exercises, stretching and strenghtening    Subjective:     Pain Ratin  \"Left axilla feels sore\"          Objective:     Caregiver present: No    Left shoulder abduction to 100o active and flexion 145o active.  Observation of swelling: left upper extremity is better.     Volume measurements taken:  No      Skin condition is:  Intact, dry.    Compression:  Garment(s) worn to treatment.One piece arm sleeve.    Medication for infection:  No    Treatment Today     TREATMENT MINUTES COMMENTS   Evaluation     Self-care/ Home management     Manual therapy 44 Position supine:  Myofascial release (layers " 1-3): Left: teres major, pec minor, upper trap. Bilateral: Cervical paraspinals, scalenes, SCM.  -left side-glides of C3-C6 grade 3     Vertical thoracic release  Pectoral release.Left Axilla with tissue bending techniques, left arm pull.     Neuromuscular Re-education     Therapeutic Activity     Therapeutic Exercises 15 Exercises:  Exercise #1: supine pectoral  stretch, latissimus dorsi, breathing.  Comment #1: seated shoulder rolls, nerve glides, provided written instructions.  Exercise #2: nu step level 1 x 3 minutes, seat 6, patient wears her compression sleeve during exercises.     Gait training     Modality__________________                Total 59    Blank areas are intentional and mean the treatment did not include these items.       Chitra Palomino PT, ELLA-ROBERT  10/31/2017

## 2021-06-13 NOTE — PROGRESS NOTES
[facility] Hematology and Oncology Progress Note    Patient: Linda Canada  MRN: [id number]  Date of Service: 10/27/2017        Reason for Visit    Chief Complaint   Patient presents with     HE Cancer       Assessment and Plan  Malignant neoplasm of upper-outer quadrant of left female breast    Staging form: Breast, AJCC 7th Edition    - Clinical: No stage assigned - Unsigned    - Pathologic stage from 6/14/2017: Stage IIIA (T2, N2a, cM0) - Signed by Joie Ferro MD on 6/14/2017    1. Breast cancer, stage IIIA ([id number]): pt has completed 6 cycles of TCHP and is here today to continue on maintenance herceptin. She will get that javier 3 weeks. Due for MUGA scan in December. She will see Dr. Ferro in 6 weeks with labs. She has an appointment with Dr. Pinzon from radiation oncology in 2 weeks. We did talk briefly about hormonal therapy and she will likely start an AI after radiation is done. I did tell them she will take that for 5-10 years.     2. Positive margin from surgery: pt met with Dr. Kwong and because of the location and the proximity to the chest wall, she does not think she could remove more. She did get an MRI and it does not show any abnormalities. Radiation is aware and will alter radiation due to it.     3. Anemia/thrombocytopenia: chemo induced and usually cumulative. Overall asymptomatic except fatigue. Continue to monitor. No bleeding or bruising issues.     ECOG Performance   ECOG Performance Status: 1     Distress Assessment  Distress Assessment Score: 8: continue to see therapist regularly.     Pain  Currently in Pain: Yes  Pain Score (Initial OR Reassessment): 9  Location: left breast/arm: from portacath needed. States the nurse put it in a little hard today. Continue to monitor, but no need for anything further.       Problem List    1. Malignant neoplasm of upper-outer quadrant of left breast in female, estrogen receptor positive  Infusion Appointment    Infusion Appointment    [facility] OFFICE VISIT  [facility]    [facility]      ______________________________________________________________________________    History of Present Illness    Diagnosis  5/31/17  Stage IIIA (pT2, pN2a, M0) invasive ductal carcinoma of the left breast   - ER (high positive, 80-90%), ID (high positive, 90%) HER2 (+ by FISH by avg HER2 signals 5.3, HER2/CEP17 ratio 2.3)  - [place] grade 3  - Tumor size: 26 mm  - Margins positive and posterior margin for invasive component and positive for posterior margin for DCIS.  - Angiolymphatic invasion present.  - 5/12 left axillary lymph node positive for carcinoma  - associated DCIS, grade 2, solid and cribriform with focal comedonecrosis.  20%,       PET scan did not show distant metastases.      Therapy to date:  5/31/17 - left partial mastectomy and Left axillary lymph node dissection  6/23/17- 10/6/17-C#1-6 TCHP; added neulasta support to TCHP starting C#2  Plan is to complete adjuvant chemotherapy then to reevaluate for a reexcision lumpectomy later.     Cardiac MUGA  6/16/17- LVEF 59.4%  9/15/17- LVEF 59%     History of Present Illness  Pt is here today to start maintenance herceptin. She states she is very happy to be done with chemo. She continues to have anxiety issues. She denies any new pain except her port hurts after being accessed because she said the nurse did it kind of hard.       Pain Status  Currently in Pain: Yes    Review of Systems    Constitutional  Constitutional (WDL): Exceptions to WDL  Fatigue: Fatigue relieved by rest  Neurosensory  Neurosensory (WDL): Exceptions to WDL  Peripheral Motor Neuropathy: Moderate symptoms, limiting instrumental ADL  Ataxia: Asymptomatic, clinical or diagnostic observations only, intervention not indicated  Peripheral Sensory Neuropathy: Moderate symptoms, limiting instrumental ADL  Eye   Eye Disorder (WDL): Exceptions to WDL  Blurred Vision: Intervention not indicated  Watering Eyes: Intervention not indicated  Ear  Ear Disorder (WDL):  Exceptions to WDL (pt feels hearing has worsened)  Cardiovascular  Cardiovascular (WDL): Exceptions to WDL  Palpitations: Definition: A disorder characterized by inflammation of the muscle tissue of the heart. (has panick attacks)  Pulmonary  Respiratory (WDL): Within Defined Limits  Gastrointestinal  Gastrointestinal (WDL): Exceptions to WDL  Anorexia: Loss of appetite without alteration in eating habits  Constipation: Persistent symptoms with regular use of laxatives or enemas, limiting instrumental ADL  Dysgeusia: Altered taste with change in diet (e.g., oral supplements), noxious or unpleasant taste, loss of taste  Genitourinary  Genitourinary (WDL): Exceptions to WDL  Urinary Tract Pain: Mild pain (sometimes )  Lymphatic  Lymph (WDL): Exceptions to WDL  Musculoskeletal and Connective Tissue  Musculoskeletal and Connetive Tissue Disorders (WDL): Exceptions to WDL  Muscle Weakness : Symptomatic, perceived by patient but not evident on physical exam  Integumentary  Integumentary (WDL): Exceptions to WDL  Alopecia: Hair loss of >50% normal for that individual that is readily apparent to others, a wig or hair piece is necessary if the patient desires to completely camouflage the hair loss, associated with psychosocial impact  Patient Coping  Patient Coping: Accepting  Distress Assessment  Distress Assessment Score: 8  Accompanied by  Accompanied by: Family Member  Oral Chemo Adherence       Past History  Past Medical History:   Diagnosis Date     Anxiety      Breast cancer 05/31/2017    left breast       PHYSICAL EXAM:  /72  Pulse 72  Temp 97.7  F (36.5  C) (Oral)   Wt 157 lb (71.2 kg)  SpO2 99%  BMI 29.18 kg/m2  GENERAL: no acute distress. Cooperative in conversation. Here with partner and   HEENT: pupils are equal, round and reactive. Oromucosa is clean and intact. No ulcerations or mucositis noted. No bleeding noted.  RESP: lungs are clear bilaterally per auscultation. Regular respiratory  rate. No wheezes or rhonchi.  CV: Regular, rate and rhythm. No murmurs.  ABD: soft, nontender. Positive bowel sounds. No organomegaly.   MUSCULOSKELETAL: No lower extremity swelling.   NEURO: non focal. Alert and oriented x3.   PSYCH: within normal limits. No depression or anxiety.  SKIN: warm dry intact, port is CDI  LYMPH: no cervical, supraclavicular  lymphadenopathy        Lab Results    Recent Results (from the past 168 hour(s))   Magnesium (add-ons/treatment plan)   Result Value Ref Range    Magnesium 1.6 (L) 1.8 - 2.6 mg/dL   Comprehensive Metabolic Panel   Result Value Ref Range    Sodium 141 136 - 145 mmol/L    Potassium 3.7 3.5 - 5.0 mmol/L    Chloride 109 (H) 98 - 107 mmol/L    CO2 26 22 - 31 mmol/L    Anion Gap, Calculation 6 5 - 18 mmol/L    Glucose 99 70 - 125 mg/dL    BUN 12 8 - 22 mg/dL    Creatinine 0.70 0.60 - 1.10 mg/dL    GFR MDRD Af Amer >60 >60 mL/min/1.73m2    GFR MDRD Non Af Amer >60 >60 mL/min/1.73m2    Bilirubin, Total 0.2 0.0 - 1.0 mg/dL    Calcium 8.8 8.5 - 10.5 mg/dL    Protein, Total 6.4 6.0 - 8.0 g/dL    Albumin 3.2 (L) 3.5 - 5.0 g/dL    Alkaline Phosphatase 74 45 - 120 U/L    AST 19 0 - 40 U/L    ALT 19 0 - 45 U/L   HM1 (CBC with Diff)   Result Value Ref Range    WBC 4.8 4.0 - 11.0 thou/uL    RBC 2.98 (L) 3.80 - 5.40 mill/uL    Hemoglobin 9.5 (L) 12.0 - 16.0 g/dL    Hematocrit 28.7 (L) 35.0 - 47.0 %    MCV 96 80 - 100 fL    MCH 31.9 27.0 - 34.0 pg    MCHC 33.1 32.0 - 36.0 g/dL    RDW 17.6 (H) 11.0 - 14.5 %    Platelets 129 (L) 140 - 440 thou/uL    MPV 9.3 8.5 - 12.5 fL    Neutrophils % 66 50 - 70 %    Lymphocytes % 25 20 - 40 %    Monocytes % 10 2 - 10 %    Eosinophils % 0 0 - 6 %    Basophils % 0 0 - 2 %    Neutrophils Absolute 3.1 2.0 - 7.7 thou/uL    Lymphocytes Absolute 1.2 0.8 - 4.4 thou/uL    Monocytes Absolute 0.5 0.0 - 0.9 thou/uL    Eosinophils Absolute 0.0 0.0 - 0.4 thou/uL    Basophils Absolute 0.0 0.0 - 0.2 thou/uL       Imaging    Mr Breast Bilateral With Without  Contras    Result Date: 10/20/2017  MR BREAST W WO CONTRAST BILATERAL 10/19/2017 11:47 AM INDICATION: Left breast cancer in the upper outer quadrant status post lumpectomy with positive deep margin in May 2017. Has since received adjuvant chemotherapy. TECHNIQUE: 3D high-resolution images, vascular subtraction images with 8 mL Gadavist and MIP images performed. The study was processed using a Chug computer-aided detection system to optimize radiologist interpretation by generating multiplanar and 3D reconstructions, creating subtraction images from the dynamic contrast data, and computing tumor volumes and dimensions. COMPARISON: Mammogram and ultrasound on 5/15/2017 RIGHT BREAST: There is heterogeneous fibroglandular tissue. There is minimal background enhancement. No suspicious mass or enhancement. A marker was placed on the skin at the site of what is reported as a cyst by the patient. No underlying abnormality at  this site. The axilla is unremarkable. MIP images confirm these findings. LEFT BREAST: There is heterogeneous fibroglandular tissue. There is minimal background enhancement. There are lumpectomy changes in the upper outer quadrant with an associated 3.4 x 1.8 cm postoperative seroma. No suspicious mass or enhancement. In particular no suspicious findings along the posterior margin of the lumpectomy site. The axilla is unremarkable. MIP images confirm these findings.     Post lumpectomy changes on the left. No MRI evidence for malignancy in either breast. RIGHT BREAST: ACR BI-RADS Category 1: Negative. LEFT BREAST: ACR BI-RADS Category 2: Benign.     present and was involved in entire conversation      Signed by: Karen Rose CNP

## 2021-06-13 NOTE — PROGRESS NOTES
[facility]   Lymphedema Initial Evaluation      Patient Name: Linda Canada  Date of evaluation: 10/10/2017  Referral Diagnosis: left shoulder decreased ROM  Referring provider: Margaret Domingo, *  Visit Diagnosis:     ICD-10-CM    1. Contracture, left shoulder M24.512    2. Scar condition and fibrosis of skin L90.5    3. Post-mastectomy lymphedema syndrome I97.2        Assessment:   Linda Canada is a 62 y.o. female who presents to therapy today with chief complaints of left arm pull. Onset date of sx was after breast surgery.  Pt reported h/o ongoing cancer treatments.  Pain symptoms are increased when reaching. Previous treatments have included wears compression sleeve one pice 15 mmHg. Functional impairments include reaching.  Pt demo's signs and sx consistent with left shoulder contracture, weakness.    Patient has ongoing chemotherapy.    Pt. would be a good candidate for edema management and to develop a home management program.    From Dr Ferro, S note 9-15-17  # Stage IIIA (pT2, pN2a, M0) invasive ductal carcinoma of the left breast- grade 3, ER (high positive), CA (positive) and HER2 (positive) with associated high-grade DCIS.  Final margins for invasive carcinoma and DCIS are positive.  S/p left breast lumpectomy, left axillary sentinel lymph node biopsy and a left axillary lymph node dissection on 5/31/17    Goals:   Pt. will be independent with home exercise program in : 4 weeks  Patient will reach / maintain arm movement: forward;overhead;for dressing;in 4 weeks  Patient will have decreased fibrosis, scar tissue for improved lymphatic mobilitiy : in 4 weeks  Patient will perform, verbalize self-management of: skin care;self-monitoring;exercise;massage;infection prevention;in 4 weeks    Goals and plan of care were set in collaboration with the patient.    Patient's expectations/goals are realistic.    Barriers to Learning or Achieving Goals:  No Barriers.    Lymphedema Category:  I - Stage  0 with Low Functional Demand       Plan / Patient Instructions:      Plan of Care:   Communication with: Referral Source  Patient Related Instruction: Nature of Condition;Treatment plan and rationale;Self Care instruction;Basis of treatment;Body mechanics;Posture;Precautions;Next steps;Expected outcome  Times per Week: 2  Number of Weeks: 5  Number of Visits: 10  Discharge Planning: patient will be discharge to self care with assistance from her partner.  Therapeutic Exercise: ROM;Stretching;Strengthening  Neuromuscular Reeducation: kinesio tape;posture  Manual Therapy: soft tissue mobilization;myofascial release  Treatment techniques, plan of care, and goals were discussed with the patient. The patient agrees to the plan as outlined. The plan of care is dynamic and will be modified on an ongoing basis.  Plan for next visit: MFR, patient/caregiver instruction, left shoulder ROM stretching/strenghtening, HEP.     Subjective:         Social information:   Living Situation:single family home, lives with others  and her partner cares for her.   Occupation:unemployed   Work Status:NA   Equipment Available: None    History of Present Illness:    Linda is a 62 y.o. female who presents to therapy today with complaints of left shoulder weakness. Date of onset/duration of symptoms is 17. Onset was gradual. Symptoms are constant and not improving. She denies history of similar symptoms. She describes their previous level of function as not limited    Pain Ratin  Pain rating at best: 1  Pain rating at worst: 3  Pain description: aching    Functional limitations are described as occurring with:   personal cares donning shirt or jacket, donning bra, combing hair, washing hair and washing body  reaching overhead and behind back  performing routine daily activities  sleeping    Patient reports benefit from:  rest         Objective:          Upper Extremity Lymphedema Volume Measure            DATE: 10-9-17 10-9-17              Side R L R L R L R L R L R L   Thumb 6.5 6.2             Index 6.4 6             Middle 6 5.6             Ring 5.6 5.4             Little 5.3 5.3             10 cm from third fingertip 17.8 17.3             8 cm to wrist 15 15.1             + 8 cm 17.2 17.2             + 16 cm 23.9 21.6             + 24 cm 25 25             + 32 cm 28.7 28.9             + 40 cm 33 33.2             Volume               Loss of Volume               Difference R to L                 Degree of swelling: Minimal < 10%    Areas of most significant swelling: left lateral trunk.    Left shoulder flexion 110o                        Abduction 80o    Examination  1. Contracture, left shoulder     2. Scar condition and fibrosis of skin     3. Post-mastectomy lymphedema syndrome       Precautions/Restrictions: ongoing cancer treatments.  Involved side: Left  Posture Observation:      General standing posture is fair.    Surgery: Lumpectomy  Node removal  Treatments: Chemotherapy    Involved area: Right Arm  Edema: Absent  Induration: No  Fibrosis: absent  Temperature: Normal  Sensation: Hypersensitive  Skin color: Normal  Skin texture: Dry  Scars: Location: left upper outer quadrant x 2.  Size: 8 cm.  Wounds: Absent  Muscle tone: Atrophy  Capillary refill: symmetrical .    Gait: independent without assistive device.          Treatment Today     TREATMENT MINUTES COMMENTS   Evaluation 30 low   Self-care/ Home management 15 Instructed on skin care, lymphedema, precautions, infections and care of the skin,patient was able to verbalize instructions, her questions were answered and provided written instructions.  Discussed POC, nature of condition and basis of treatment   Manual therapy 10 MFR with intruction to caregiver  Left shoulder, lateral trunk.   Neuromuscular Re-education     Therapeutic Activity     Therapeutic Exercises 5 Instructed on codmans and shoulder exercises, provided written instructions   Gait training      Modality__________________                Total 60    Blank areas are intentional and mean the treatment did not include these items.     PT Evaluation Code: (Please list factors)  Patient History/Comorbidities: cancer  Examination: left shoulder decreased ROM  Clinical Presentation: stable  Clinical Decision Making: low    Patient History/  Comorbidities Examination  (body structures and functions, activity limitations, and/or participation restrictions) Clinical Presentation Clinical Decision Making (Complexity)   No documented Comorbidities or personal factors 1-2 Elements Stable and/or uncomplicated Low   1-2 documented comorbidities or personal factor 3 Elements Evolving clinical presentation with changing characteristics Moderate   3-4 documented comorbidities or personal factors 4 or more Unstable and unpredictable High            Chitra Palomino PT, CLT-ROBERT  10/10/2017  3:53 PM

## 2021-06-13 NOTE — PROGRESS NOTES
Patient arrived ambulatory, accompanied by daughter and , for treatment, after seeing NP.  IV of NS initiated for treatment at gripper needle in port.  Given Herceptin IVPB over 30 minutes.  Port flushed with NS and Heparin instilled.  Needle dced and dressing applied.  Patient left ambulatory, accompanied by daughter and .  Instructed to call with questions/concerns/problems.  Patient verbalized understanding.

## 2021-06-13 NOTE — PROGRESS NOTES
[facility] Hematology and Oncology Progress Note    Patient: Linda Canada  MRN: [id number]  Date of Service: 10/6/2017        Reason for Visit    Follow-up breast cancer on adjuvant chemotherapy.    Assessment and Plan  Malignant neoplasm of upper-outer quadrant of left female breast    Staging form: Breast, AJCC 7th Edition    - Clinical: No stage assigned - Unsigned    - Pathologic stage from 6/14/2017: Stage IIIA (T2, N2a, cM0) - Signed by Joie Ferro MD on 6/14/2017    ECOG Performance   ECOG Performance Status: 1     Distress Assessment  Distress Assessment Score: Extreme distress    Pain  Pain Score (Initial OR Reassessment): 7  Location: Left breast/chest      # Stage IIIA (pT2, pN2a, M0) invasive ductal carcinoma of the left breast- grade 3, ER (high positive), NM (positive) and HER2 (positive) with associated high-grade DCIS.  Final margins for invasive carcinoma and DCIS are positive.  S/p left breast lumpectomy, left axillary sentinel lymph node biopsy and a left axillary lymph node dissection on 5/31/17.   - She is physcially feeling well without any evidence of infections.  She has stress and panic attack related to dexamethasone use.  Her labs are adequate.  Reviewed her cardiac MUGA scan and it is normal and no changes in her cardiac function.  She feels well enough to proceed with cycle #6 chemotherapy which is the last cycle of chemotherapy then she will continue on Herceptin every 3 weeks..     -Due to her severe panic attacks, advised her to stop dexamethasone and continue with other antiemetics for the next few days to control nausea/vomiting.   -Referral back to Dr. Kwong discussion about additional reexcision for positive margins.  She has understanding that she will need to see radiation oncology after that.  She is also made aware that she will continue Herceptin.   - She will also need endocrine therapy at the end of radiation for 5 years.   -RTC in 3 weeks with labs.    #.  Left upper  extremity restricted range of motion.   - She will start physical therapy next week 10/9/17.   - seen by Dr. Domingo and she is very happy with the visit. She is now wearing the sleeve.     #. Depression/anxiety- chronic with recent exacerbation due to recent diagnosis of breast cancer.                 - She had a panic attack last night with palpitation, anxiety and confused, but lasted for several minutes.  Dexamethasone was discontinued as it triggered her attack. I made her aware that she needs to go into emergency room if she or call the crisis line if she experience another attack like this.  She denies any homicidal or suicidal ideation today.    - She will continue to work with her own psychologist at [facility].      Problem List    1. Malignant neoplasm of upper-outer quadrant of left breast in female, estrogen receptor positive  prochlorperazine (COMPAZINE) 10 MG tablet    Infusion Appointment    CC OFFICE VISIT LONG    Ambulatory referral to General Surgery    DISCONTINUED: sodium chloride 0.9% 250 mL infusion    DISCONTINUED: palonosetron injection 0.25 mg (ALOXI)    DISCONTINUED: fosaprepitant 150 mg in sodium chloride 0.9% 150 mL (EMEND)    DISCONTINUED: pertuzumab 420 mg in sodium chloride 0.9% 250 mL chemo (PERJETA)    DISCONTINUED: trastuzumab 430 mg in sodium chloride 0.9% 250 mL chemo (HERCEPTIN)    DISCONTINUED: DOCEtaxel 130 mg in dextrose 5% (non-PVC) 250 mL chemo (TAXOTERE)    DISCONTINUED: CARBOplatin 740 mg in dextrose 5% 250 mL chemo (PARAPLATIN)    DISCONTINUED: pegfilgrastim injection 6 mg (NEULASTA DELIVERY KIT)    DISCONTINUED: sodium chloride 0.9 % flush 20 mL (NS)    DISCONTINUED: heparin 100 unit/mL lockflush (PF) porcine 300-600 Units    DISCONTINUED: diphenhydrAMINE injection 50 mg (BENADRYL)    DISCONTINUED: famotidine 20 mg/2 mL injection 20 mg (PEPCID)    DISCONTINUED: hydrocortisone sod succ (PF) 100 mg/2 mL injection 100 mg    DISCONTINUED: acetaminophen tablet 1,000  mg (TYLENOL)      ______________________________________________________________________________    Diagnosis  5/31/17  Stage IIIA (pT2, pN2a, M0) invasive ductal carcinoma of the left breast   - ER (high positive, 80-90%), [place] (high positive, 90%) HER2 (+ by FISH by avg HER2 signals 5.3, HER2/CEP17 ratio 2.3)  - [place] grade 3  - Tumor size: 26 mm  - Margins positive and posterior margin for invasive component and positive for posterior margin for DCIS.  - Angiolymphatic invasion present.  - 5/12 left axillary lymph node positive for carcinoma  - associated DCIS, grade 2, solid and cribriform with focal comedonecrosis.  20%,      PET scan did not show distant metastases.      Therapy to date:  5/31/17 - left partial mastectomy and Left axillary lymph node dissection  6/23/17- 10/6/17-C#1-6 TCHP; added neulasta support to TCHP starting C#2  Plan is to complete adjuvant chemotherapy then to reevaluate for a reexcision lumpectomy later.    Cardiac MUGA  6/16/17- LVEF 59.4%  9/15/17- LVEF 59%    History of Present Illness    [employer]  was used for this encounter.  She is accompanied by her partner, Jacinda.    She is here for cycle 6 chemotherapy.  She continues struggling with depression, anxiety.  She had a panic attack last evening after 1 dose of dexamethasone.  Her significant other did not bring her as it was resolved.  She did not take dexamethasone after.  She denies any other symptoms of bone pain.  No headaches.  She has blurry vision.  Runny nose.  No other intolerable side effects from chemotherapy.  However she has been struggling with most symptoms, trouble sleeping and panic symptoms from dexamethasone.  Apart from describing in HPI and ROS, the remainder of comprehensive ROS was negative.    Pain Status       Review of Systems    Constitutional  Constitutional (WDL): Exceptions to WDL  Fatigue: Fatigue relieved by rest  Fever: None  Chills: None  Weight Gain:  "None  Weight Loss: None  Neurosensory  Neurosensory (WDL): Exceptions to WDL  Peripheral Motor Neuropathy: Asymptomatic, clinical or diagnostic observations only, intervention not indicated  Ataxia: None  Peripheral Sensory Neuropathy: Asymptomatic, loss of deep tendon reflexes or paresthesia (bilat hands and feet)  Confusion: None (felt confused last night after taking decadron)  Syncope: None  Eye   Eye Disorder (WDL): Exceptions to WDL (itching eyes)  Blurred Vision: Intervention not indicated  Dry Eye: Asymptomatic, clinical or diagnostic observations only, mild symptoms relieved by lubricants  Eye Pain: None  Watering Eyes: Intervention not indicated  Ear  Ear Disorder (WDL): Exceptions to WDL  Ear Pain: None  Tinnitus: Mild symptoms, intervention not indicated (\"buzzing\")  Cardiovascular  Cardiovascular (WDL): Exceptions to WDL  Palpitations: Definition: A disorder characterized by inflammation of the muscle tissue of the heart. (last night)  Pulmonary  Respiratory (WDL): Exceptions to WDL  Cough: Mild symptoms, nonprescription intervention indicated (dry)  Dyspnea: Shortness of breath with moderate exertion  Hypoxia: None  Gastrointestinal  Gastrointestinal (WDL): Exceptions to WDL  Anorexia: Loss of appetite without alteration in eating habits  Constipation: None  Diarrhea: None  Dysphagia: None  Esophagitis: None  Nausea: Loss of appetite without alteration in eating habits  Pharyngitis: None  Vomiting: None  Dysgeusia: Altered taste but no change in diet  Dry Mouth: None  Genitourinary  Genitourinary (WDL): Exceptions to WDL (intermittent burinng sensation)  Lymphatic  Lymph (WDL): Exceptions to WDL  Musculoskeletal and Connective Tissue  Musculoskeletal and Connetive Tissue Disorders (WDL): Exceptions to WDL  Arthralgia: None  Bone Pain: None  Muscle Weakness : Symptomatic, perceived by patient but not evident on physical exam  Myalgia: None  Integumentary  Integumentary (WDL): Exceptions to " WDL  Alopecia: Hair loss of >50% normal for that individual that is readily apparent to others, a wig or hair piece is necessary if the patient desires to completely camouflage the hair loss, associated with psychosocial impact  Patient Coping  Patient Coping: Anxiety  Distress Assessment  Distress Assessment Score: Extreme distress  Accompanied by  Accompanied by: Family Member (Interpretor, Vero Pitts)  Oral Chemo Adherence       Past History  Past Medical History:   Diagnosis Date     Anxiety      Breast cancer 05/31/2017    left breast       Physical Exam    Recent Vitals 10/6/2017   Height -   Weight 157 lbs 3 oz   BSA (m2) -   /63   Pulse 87   Temp 97.7   Temp src 1   SpO2 97   Some recent data might be hidden     General: alert, awake, not in acute distress  HEENT: Head: Normal, normocephalic, atraumatic.  Eye: Normal external eye, conjunctiva, lids cornea, BALA.  Ears: Non-tender.  Nose: Normal external nose, mucus membranes and septum.  Pharynx: Dental Hygiene adequate. Normal buccal mucosa.  The meatus pharynx but no open ulceration.    Neck / Thyroid: Supple, no masses, nodes, nodules or enlargement.  Lymphatics: No abnormally enlarged lymph nodes.  Chest: Normal chest wall and respirations. Clear to auscultation.  Breast: Left breast showed well-healed scar with some skin induration as expected.  Limited range of motion in the left shoulder.  No palpable adenopathy no masses.  Heart: S1 S2 RRR, no murmur.   Abdomen: abdomen is soft without significant tenderness, masses, organomegaly or guarding  Extremities: normal strength, tone, and muscle mass  Skin: normal. Crusted papular rash on scalp.  CNS: non focal.      Lab Results    Recent Results (from the past 168 hour(s))   Magnesium (add-ons/treatment plan)   Result Value Ref Range    Magnesium 1.9 1.8 - 2.6 mg/dL   Comprehensive Metabolic Panel   Result Value Ref Range    Sodium 142 136 - 145 mmol/L    Potassium 3.6 3.5 - 5.0 mmol/L     Chloride 110 (H) 98 - 107 mmol/L    CO2 24 22 - 31 mmol/L    Anion Gap, Calculation 8 5 - 18 mmol/L    Glucose 135 (H) 70 - 125 mg/dL    BUN 17 8 - 22 mg/dL    Creatinine 0.68 0.60 - 1.10 mg/dL    GFR MDRD Af Amer >60 >60 mL/min/1.73m2    GFR MDRD Non Af Amer >60 >60 mL/min/1.73m2    Bilirubin, Total 0.3 0.0 - 1.0 mg/dL    Calcium 9.5 8.5 - 10.5 mg/dL    Protein, Total 6.9 6.0 - 8.0 g/dL    Albumin 3.8 3.5 - 5.0 g/dL    Alkaline Phosphatase 86 45 - 120 U/L    AST 16 0 - 40 U/L    ALT 17 0 - 45 U/L   HM1 (CBC with Diff)   Result Value Ref Range    WBC 10.3 4.0 - 11.0 thou/uL    RBC 3.08 (L) 3.80 - 5.40 mill/uL    Hemoglobin 9.6 (L) 12.0 - 16.0 g/dL    Hematocrit 28.2 (L) 35.0 - 47.0 %    MCV 92 80 - 100 fL    MCH 31.2 27.0 - 34.0 pg    MCHC 34.0 32.0 - 36.0 g/dL    RDW 18.6 (H) 11.0 - 14.5 %    Platelets 141 140 - 440 thou/uL    MPV 8.9 8.5 - 12.5 fL    Neutrophils % 89 (H) 50 - 70 %    Lymphocytes % 9 (L) 20 - 40 %    Monocytes % 1 (L) 2 - 10 %    Eosinophils % 0 0 - 6 %    Basophils % 0 0 - 2 %    Neutrophils Absolute 9.2 (H) 2.0 - 7.7 thou/uL    Lymphocytes Absolute 0.9 0.8 - 4.4 thou/uL    Monocytes Absolute 0.1 0.0 - 0.9 thou/uL    Eosinophils Absolute 0.0 0.0 - 0.4 thou/uL    Basophils Absolute 0.0 0.0 - 0.2 thou/uL       Imaging    Nm Muga    Result Date: 9/15/2017    The left ventricular ejection fraction is 59%.          Signed by: RASTA Bonds

## 2021-06-13 NOTE — PROGRESS NOTES
Met with Linda to discuss [employer] remaining funds in amount of $26.54 expiring 11/16/17.  She hoped to apply this to [employer] card, but the minimum on those gift cards is $50.  I have suggested she may use remaining amount toward a bill or a [employer] $25 and release the remaining $1.54 and she did choose the option of [employer] gift card.  Bill Payment form with that request has been faxed to [facility]. Jennifer Ventura RN

## 2021-06-13 NOTE — PROGRESS NOTES
PT here with  and partner for cycle 5 day 1 txt. Labs previously drawn from port approved for txt. txt reviewed with pt and administered as ordered. PT tolerated txt without any side effects. neulasta  placed on and reviewed with pt that it will administer neulasta at around tomorrow and be completed around. Follow up reviewed/tubing flushed with ns upon completion of txt.  Port flushed with heparin/deaccessed with 2x2 to site. Pt dc'd steady gait with partner.

## 2021-06-13 NOTE — PROGRESS NOTES
Nutrition Phone contact  9/21/2017: RD called patient Left phone message and offered appointment time(s) to meet.

## 2021-06-13 NOTE — PROGRESS NOTES
"[facility] Daily Progress     Patient Name: Linda Canada  Date: 11/3/2017  Visit #: 6  PTA visit #:  1    Date of evaluation: 10/10/2017  Referral Diagnosis: left shoulder decreased ROM  Referring provider: Margaret Domingo  Visit Diagnosis:     ICD-10-CM    1. 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 Contracture, left shoulder M24.512    3. Scar condition and fibrosis of skin L90.5          Assessment:   Pt was able to perform strengthening exercises without increased symptoms.    Patient is benefitting from skilled physical therapy and is making steady progress toward functional goals.  Patient is appropriate to continue with skilled physical therapy intervention, as indicated by initial plan of care.    Goal Status: On going  Pt. will be independent with home exercise program in : 4 weeks;Progressing toward  Patient will reach / maintain arm movement: forward;overhead;behind;for dressing;Progressing toward  Patient will have decreased fibrosis, scar tissue for improved lymphatic mobilitiy : in 4 weeks;Progressing toward  Patient will perform, verbalize self-management of: skin care;self-monitoring;exercise;massage;infection prevention;in 4 weeks;Progressing toward    Plan / Patient Education:     Continue with initial plan of care.  Progress with home program as tolerated. MFR, nerve glides, therapeutic exercises, stretching and strenghtening    Subjective:     Pain Ratin  Pt reports her L UE is getting better.   Pt states that her L chest wall feels tender after the treatment.  Pt reports her L UE gets tired.  Pt continues to have numbness in L fingers and her toes.          Objective:     Caregiver present: No    Left shoulder ROM:  Flexion: 140 degrees \"tight\"  AB: 145 degrees \"tight.\"  Observation of swelling: left upper extremity is better.     Volume measurements taken:  No      Skin condition is:  Intact, dry.    Compression:  Garment(s) worn to treatment.One piece arm " sleeve.    Medication for infection:  No    Treatment Today     TREATMENT MINUTES COMMENTS   Evaluation     Self-care/ Home management     Manual therapy 40 Position supine:  Myofascial release (layers 1-3): Left: teres major, pec minor, upper trap. Bilateral: Cervical paraspinals, scalenes, SCM.        left arm pull.     Neuromuscular Re-education     Therapeutic Activity     Therapeutic Exercises 15 Exercises:  Exercise #1: supine pectoral  stretch, latissimus dorsi, breathing.  Comment #1: seated shoulder rolls, nerve glides, provided written instructions.  Exercise #2: nu step level 1 x 3 minutes, seat 6, patient wears her compression sleeve during exercises.   UBE x2.5' today, Nustep not available  Added to HEP:  -bent over rows  -SL ER     Gait training     Modality__________________                Total 55    Blank areas are intentional and mean the treatment did not include these items.       Divina Hoff PTA,CLT  11/3/2017

## 2021-06-13 NOTE — PROGRESS NOTES
"[facility] Daily Progress     Patient Name: Linda Canada  Date: 10/26/2017  Visit #: 3  PTA visit #:  -    Date of evaluation: 10/10/2017  Referral Diagnosis: left shoulder decreased ROM  Referring provider: Margaret Domingo  Visit Diagnosis:     ICD-10-CM    1. 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 Contracture, left shoulder M24.512    3. Scar condition and fibrosis of skin L90.5          Assessment:     Left shoulder tenderness with end range flexion.  Patient is compliant wearing her one piece compression arm sleeve and has good tolerance to strengthening exercises.  Patient is concerned about been able to come to fllow up visits due to her partner will start orientation .    Patient is benefitting from skilled physical therapy and is making steady progress toward functional goals.  Patient is appropriate to continue with skilled physical therapy intervention, as indicated by initial plan of care.    Goal Status:  Pt. will be independent with home exercise program in : 4 weeks;Progressing toward  Patient will reach / maintain arm movement: forward;overhead;behind;for dressing;in 4 weeks;Progressing toward  Patient will have decreased fibrosis, scar tissue for improved lymphatic mobilitiy : in 4 weeks;Progressing toward  Patient will perform, verbalize self-management of: skin care;self-monitoring;exercise;massage;infection prevention;in 4 weeks;Progressing toward    Plan / Patient Education:     Continue with initial plan of care.  Progress with home program as tolerated. MFR, nerve glides, therapeutic exercises, stretching and strenghtening    Subjective:     Pain Ratin  \"Left shoulder feels sore\"          Objective:     Caregiver present: No    Left shoulder abduction to 97o active and flexion 99o active.  Observation of swelling: left upper extremity is better.     Volume measurements taken:  No      Skin condition is:  Intact, dry.    Compression:  Garment(s) worn to " treatment.One piece arm sleeve.    Medication for infection:  No    Treatment Today     TREATMENT MINUTES COMMENTS   Evaluation     Self-care/ Home management     Manual therapy 44 Position supine:  Myofascial release (layers 1-3): Left: teres major, pec minor, upper trap. Bilateral: Cervical paraspinals, scalenes, SCM.  -left side-glides of C3-C6 grade 3     Vertical thoracic release  Pectoral release.Left Axilla with tissue bending techniques, left arm pull.     Neuromuscular Re-education     Therapeutic Activity     Therapeutic Exercises 15 Exercises:  Exercise #1: supine pectoral  stretch, latissimus dorsi, breathing.  Comment #1: seated shoulder rolls, nerve glides, provided written instructions.  Exercise #2: nu step level 1 x 3 minutes, seat 6, patient wears her compression sleeve during exercises.     Gait training     Modality__________________                Total 59    Blank areas are intentional and mean the treatment did not include these items.       Chitra Palomino PT, ELLA-ROBERT  10/26/2017

## 2021-06-13 NOTE — PROGRESS NOTES
Linda comes in for continued follow up of her left breast cancer.  She underwent a left lumpectomy and axillary lymph node dissection about 4 months prior to this.  She had a positive deep margin however in reviewing my notes I was down to chest wall with that lumpectomy.  5 out of 12 lymph nodes were involved.  She has been undergoing chemotherapy and is finished the first part of it.  She will continue to get Herceptin.  The question is whether or not she needs a further reexcision lumpectomy.    Physical exam:  Looks well.  Alopecia secondary to chemotherapy.  Breast: No palpable masses in either breast.  Incisions have healed up nicely.  Axilla: No axillary adenopathy.    Impression: Left breast cancer.  The dilemma is whether or not she needs a reexcision lumpectomy prior to doing radiation.  Again I was already at chest wall so I am not sure how much further I can go.  It certainly did not feel like it was attached to the chest wall at that time.  After discussing the options we are going to proceed with an MRI right now.  If that looks okay my bias would be to just proceed with radiation but I will also run this past the radiation oncologist.  If they agree with that plan and the MRI looks good then she would just follow up with me at her anniversary date with bilateral mammograms at that time.

## 2021-06-13 NOTE — PROGRESS NOTES
Nutrition Assessment    Patient seen for outpatient MNT Initial assessment    Referring diagnosis: Breast cancer diagnosis and treatment.    D: RD visited with patient on 11/2/17 via phone with the aid of an .  Pt wanting to speak to RD about diet and breast cancer.  Appetite has been good.  She wants to learn about healthy eating.  She has been avoiding red meat and pork.  She has been eating a lot of vegetables, fruits, beans, lentils.  She uses canola or olive oil for cooking.    Height: 5 2   Current weight: 157#   BMI:  29.18  BMI indication: overweight  Wt history:      A: Nutrition Diagnosis:  Increased Nutrient needs related to cancer diagnosis and treatment.  Potential for impaired swallowing capacity and/or GI issues related to cancer diagnosis  & treatment.     Assessed needs: ~1500 calories (30 kcals/kg IBW)  Protein needs: 60-75 gm protein (1.2 to 1.5 grams/kg IBW)    Nutrition Goals:     Adequate Hydration  1500-1750mL plus fluid daily      Weight maintenance 1500 plus calories/Day     Normal bowel function FIBER 25 grams daily     Protein labs WNL  Protein goal = 60 plus grams /day     Oral Hygiene Salt Water rinses &   Rx to aid swallow function if  needed     Plan:   Pt had many good questions regarding nutrition and healthy eating.   RD answered questions and encouraged intake of vegetables (especially cruciferous veggies), fruits, whole grains, healthier protein options, and healthy fats.  Pt verbalized understanding.  RD will also mail info to pt s house.  (Pt said info in English is okay as it appeared she could speak English pretty well).  Pt asked about supplements.  Told her that general multivitamin, Ca, and Vit D supplements would probably be appropriate but that she should check with MD before starting anything.    Pt notes that she will likely go to [place] for a few months at the beginning of next year.  She has my name and phone number should any further questions  arise.      KRISHAN Holman  For: Maria Guadalupe PIRES   [facility] OP Oncology   Cancer Care Dietitian

## 2021-06-14 NOTE — PROGRESS NOTES
RADIATION ONCOLOGY WEEKLY TREATMENT VISIT NOTE      Assessment / Impression       1. Malignant neoplasm of upper-outer quadrant of left breast in female, estrogen receptor positive       Malignant neoplasm of upper-outer quadrant of left female breast    Staging form: Breast, AJCC 7th Edition    - Clinical: No stage assigned - Unsigned    - Pathologic stage from 6/14/2017: Stage IIIA (T2, N2a, cM0) - Signed by Joie Ferro MD on 6/14/2017   Tolerating radiation therapy well.  All questions and concerns addressed.      Plan:   Doing well with Valium in am, so not anxiety not able to build   Has psych appointment this AM.   Continue radiation treatment as prescribed.  Radiation: Site: Left breast  Stereotactic Radiosurgery: No  Stereotactic Radiosurgery: No  Concurrent Therapy: No  Today's Dose: 1080  Total Dose for Breast: 6040  Today's Fraction/Total Fraction Breast: 6/33        Subjective:      HPI: Linda Canada is a 62 y.o. female with    1. Malignant neoplasm of upper-outer quadrant of left breast in female, estrogen receptor positive         The following portions of the patient's history were reviewed and updated as appropriate: allergies, current medications, past family history, past medical history, past social history, past surgical history and problem list.      Objective:     Exam:     Vitals:    12/12/17 0917   BP: 126/71   Pulse: 77   SpO2: 97%       Wt Readings from Last 8 Encounters:   12/08/17 147 lb (66.7 kg)   12/05/17 147 lb 4.8 oz (66.8 kg)   12/05/17 148 lb (67.1 kg)   11/22/17 148 lb (67.1 kg)   11/10/17 151 lb 4.8 oz (68.6 kg)   10/27/17 157 lb (71.2 kg)   10/13/17 149 lb 11.2 oz (67.9 kg)   10/06/17 157 lb 3.2 oz (71.3 kg)       General: Alert and oriented, in no acute distress  Linda has no Erythema.    Treatment Summary to Date    [facility] chart and setup information reviewed    Betty Pinzon MD

## 2021-06-14 NOTE — PROGRESS NOTES
Writer contacted [facility] and arranged rides through end of radiation therapy through [facility]. I decided to confirm if Linda has any coverage for rides through [employer] program (I had asked Linda to check on this in late October as well).  She does have coverage through [employer] [phone].   I telephoned and cancelled the rides through [facility] and set up rides for her [facility] appointments through the end of December 2017.  Roxie cannot schedule for 2018 yet.     I did NOT make a ride for her [place] 12/28 appointment and Linda will need to call to schedule a ride to that.  I was not sure of the clinic address she is to be seen at.  She'll also need to call for appointments AFTER 1/1/18.       Linda should be aware Roxie's phone number is on the back of her [employer] insurance card and that is [phone].  They contract with [employer] and the phone number at [phone].  [employer] also may use [employer], [employer], or [employer] so she should be watching for any of those or [employer].  The cab will always be scheduled to pick her up one hour prior to the appointment time, but they have a 30-minute window.  The  will call her before or at the time of arrival to collect her.  She should never get into a cab unless the  knows her first and last name.  The return ride is on will call basis.  Linda verbalized understanding. Jennifer Ventura RN

## 2021-06-14 NOTE — PROGRESS NOTES
PT here for herceptin infusion after exam with Karen HUBBARD. Muga scan reviewed. PT is ambulatory. Herceptin infusion reviewed with pt and infused over 30 minutes. Upon completion tubing flushed with ns then port flushed with heparin/deaccessed with 2x2 to site. Follow up reviewed and pt dc'd steady gait

## 2021-06-14 NOTE — PROGRESS NOTES
Linda arrives today with transportation from a friend who had to miss work to bring her to her appointment.  The cab arranged yesterday did not arrive as scheduled due to severe weather.  I telephoned and spoke with cab company and they expect the will-call  as arranged yesterday. Jennifer Ventura RN

## 2021-06-14 NOTE — PROGRESS NOTES
"  RADIATION ONCOLOGY WEEKLY TREATMENT VISIT NOTE      Assessment / Impression       1. Malignant neoplasm of upper-outer quadrant of left breast in female, estrogen receptor positive       Malignant neoplasm of upper-outer quadrant of left female breast    Staging form: Breast, AJCC 7th Edition    - Clinical: No stage assigned - Unsigned    - Pathologic stage from 6/14/2017: Stage IIIA (T2, N2a, cM0) - Signed by Joie Ferro MD on 6/14/2017   Tolerating radiation therapy well.  All questions and concerns addressed.      Plan:     Continue radiation treatment as prescribed.  Radiation: Site: Left breast  Stereotactic Radiosurgery: No  Stereotactic Radiosurgery: No  Concurrent Therapy: No  Today's Dose: 180  Total Dose for Breast: 6040  Today's Fraction/Total Fraction Breast: 1/33        Subjective:      HPI: Linda Canada is a 62 y.o. female with    1. Malignant neoplasm of upper-outer quadrant of left breast in female, estrogen receptor positive         The following portions of the patient's history were reviewed and updated as appropriate: allergies, current medications, past family history, past medical history, past social history, past surgical history and problem list.      Objective:     Exam:     Vitals:    12/05/17 0938   BP: 147/70   Pulse: 65   Temp: 98  F (36.7  C)   TempSrc: Oral   SpO2: 100%   Weight: 147 lb 4.8 oz (66.8 kg)   Height: 5' 1\" (1.549 m)       Wt Readings from Last 8 Encounters:   12/05/17 147 lb 4.8 oz (66.8 kg)   12/05/17 148 lb (67.1 kg)   11/22/17 148 lb (67.1 kg)   11/10/17 151 lb 4.8 oz (68.6 kg)   10/27/17 157 lb (71.2 kg)   10/13/17 149 lb 11.2 oz (67.9 kg)   10/06/17 157 lb 3.2 oz (71.3 kg)   09/15/17 158 lb 11.2 oz (72 kg)       General: Alert and oriented, in no acute distress  Linda has no Erythema.    Treatment Summary to Date    Aria chart and setup information reviewed    Betty Pinzon MD  "

## 2021-06-14 NOTE — PROGRESS NOTES
[facility] Radiation Oncology Consult Note    Patient: Linda Canada  MRN: [id number]  Date of Service: 11/10/2017    Assessment / Impression     1. Malignant neoplasm of upper-outer quadrant of left breast in female, estrogen receptor positive  Ambulatory referral to Oncology Psychotherapist    NM PET CT Skull to Mid Thigh     Malignant neoplasm of upper-outer quadrant of left female breast    Staging form: Breast, AJCC 7th Edition    - Clinical: No stage assigned - Unsigned    - Pathologic stage from 6/14/2017: Stage IIIA (T2, N2a, cM0) - Signed by Joie Ferro MD on 6/14/2017  ECOG Peformance Status  ECOG Performance Status: 1  Distress Assessment Score: 8 (mental health, anxiety around tx)    Patient very anxious.  Used lavender to help calm patient.    Plan:   Order in for PET scan to evaluate level II lymph nodes.  Will speak with radiologist regarding prior PET from June.  Schedule simulation.     Continue to see phycologist for anti-anxiety medication management.  Order in to see Josselyn Bowles.  Bring anti-anxiety meds to simulation.  Had a anxiety attack in room.  Is not a candidate for DIBH due to this anxiety.  We will still be able to spare the heart, but will use other means.     Continue to see physical therapy/Dr. Pollock for ROM.      Face to face time  60 minutes with > 75% spent on consultation, education and coordination of care.  Intent of Therapy: Curative  Patient on concurrent Herceptin Yes:   Adjuvant hormonal therapy: Yes: Agent: TBD (likely AI)  Start: Following radiation  Chemotherapy: 6 cycles TCHP   Intended fractionation schedule - standard with boost.     Breast cancer risk factors:   No obstetric history on file.  LMP Dates from Last 4 Encounters:   No data found for LMP       We recommend adjuvant radiation as part of her breast conserving therapy to decrease her chance of local recurrence to the single digits. ROM stretches and skin care were discussed with the patient. She  understands that these maneuvers need to continue for 6 months following completion of radiation as skin and muscle fibrosis continue to form for weeks to months following completion of therapy.      Side effects that may occur during or within weeks after radiation therapy      Fatigue and general weakness    Darkening, irritation, itchiness, redness, dryness, erythema, peeling, scabbing, ulceration and contraction of the skin of the breast and chest    Swelling of the breast    Loss of armpit hair    Lung irritation    Decrease in appetite    Side effects that may occur months or years after radiation therapy      Development of another tumor or cancer    Thickening, telangiectasias (development of spider like blood vessels in the skin) and ulceration of the skin of the breast and chest    Firming, fibrosis (scar tissue), fat necrosis, and distortion of the breast    Poor healing after a trauma or surgery in the irradiated area    Nerve damage resulting in loss of arm strength and sensation    Coronary artery blockage causing angina pain or a heart attack    Lung inflammation of fibrosis causing cough, fever and shortness of breath    Fracture of the ribs    Swellingof an arm and hand    Decreasein ROM resulting in shoulder/neck injury    The risks, benefits and alternatives to radiation therapy were outlined with the patient. All questions were answered and a consent was signed.        Subjective:      HPI: Linda Canada is a 62 y.o. female with left breast cancer that was picked up by the patient in April.  She then had a mammogram and ultrasound done.  Pathology from needle biopsy on 5/15/2017 showed ER/IN+, HER-2 positive invasive ductal carcinoma.         Dr. Kwong performed left lumpectomy and axillary lymph node dissection 5/31/2017.  The tumor was 2.6cm and the deep margin was positive.  5/12 lymph nodes were positive.  Re-excision was debated due to positive margins.  Post-operative course uneventful.    Elenita ordered an MRI 10/19/2017 which did not show any abnormalities.         6 cycles of TCHP competed 10/6/2017 and maintenance Herceptin started 10/27/2017.      Seen by Dr. Pollock for lymphedema and ROM exercises.       Chief Complaint   Patient presents with     HE Cancer     Breast Cancer     Prior Radiation: No  Concurrent Chemotherapy: No    Current Outpatient Prescriptions   Medication Sig Dispense Refill     acetaminophen (TYLENOL) 500 MG tablet Take 1,000 mg by mouth every 4 (four) hours as needed for pain.       diazePAM (VALIUM) 5 MG tablet Take 5 mg by mouth 2 (two) times a day as needed for anxiety.       FLUoxetine (PROZAC) 20 MG capsule Take 50 mg by mouth daily.       lidocaine-prilocaine (EMLA) cream Apply 30-45 minutes before the port access. 30 g 3     LORazepam (ATIVAN) 0.5 MG tablet Take 1-2 tablets by mouth daily as needed.       naproxen sodium (ALEVE) 220 MG tablet Take 220 mg by mouth as needed for pain.       omeprazole (PRILOSEC) 20 MG capsule Take 1 capsule (20 mg total) by mouth daily before breakfast. 30 capsule 3     QUEtiapine (SEROQUEL) 25 MG tablet 25 mg at bedtime.        ranitidine (ZANTAC) 150 MG tablet Take 150 mg by mouth. prn       traZODone (DESYREL) 50 MG tablet Take 50 mg by mouth at bedtime.        loperamide (IMODIUM A-D) 2 mg tablet Start with 4 mg, followed by 2 mg after each loose stool (maximum: 16 mg/day) 30 tablet 0     No current facility-administered medications for this visit.      Past Medical History:   Diagnosis Date     Anxiety      Breast cancer 05/31/2017    left breast     Panic attack      Past Surgical History:   Procedure Laterality Date     BREAST SURGERY Left 05/31/2017    left lumpectomy     Penicillins  Family History   Problem Relation Age of Onset     No Medical Problems Mother      Lung cancer Father 78     Diabetes Sister      Skin cancer Sister      Hypertension Sister      Social History     Social History     Marital status: Single      Spouse name: N/A     Number of children: N/A     Years of education: N/A     Occupational History     Not on file.     Social History Main Topics     Smoking status: Former Smoker     Packs/day: 1.50     Years: 12.00     Types: Cigarettes     Quit date: 6/16/2002     Smokeless tobacco: Never Used     Alcohol use No      Comment: states has not drank in a couple months     Drug use: No     Sexual activity: No     Other Topics Concern     Not on file     Social History Narrative    Female partner.  Has been with partner for 5 years.  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          Review of Systems:        General  Constitutional (WDL): Exceptions to WDL  Fatigue: Fatigue relieved by rest  Hot flashes/Night Sweats: Mild symptoms, no intervention needed  EENT  Eye Disorder (WDL): Exceptions to WDL  Blurred Vision: Intervention not indicated  Watering Eyes: Intervention not indicated  Ear Disorder (WDL): Exceptions to WDL  Tinnitus: Mild symptoms, intervention not indicated (buzzing)  Respiratory       Respiratory (WDL): Exceptions to WDL  Dyspnea: Shortness of breath with moderate exertion  Cardiovascular  Cardiovascular (WDL): All cardiovascular elements are within defined limits  Endocrine     Gastrointestinal  Gastrointestinal (WDL): Exceptions to WDL  Anorexia: Loss of appetite without alteration in eating habits  Nausea: Loss of appetite without alteration in eating habits  Dysgeusia: Altered taste but no change in diet  Musculoskeletal  Musculoskeletal and Connetive Tissue Disorders (WDL): Exceptions to WDL  Arthralgia: Mild pain  Muscle Weakness : Symptomatic, perceived by patient but not evident on physical exam  Myalgia: Mild pain (leg and knee aches, left shoulder)  Integumentary               Integumentary (WDL): Exceptions to WDL  Alopecia: Hair loss of >50% normal for that individual that is readily apparent to others, a wig or hair piece is necessary if the patient desires to  completely camouflage the hair loss, associated with psychosocial impact  Rash Maculo-Papular: None  Pruritus: None  Neurological  Neurosensory (WDL): Exceptions to WDL  Peripheral Motor Neuropathy: Asymptomatic, clinical or diagnostic observations only, intervention not indicated  Ataxia: Asymptomatic, clinical or diagnostic observations only, intervention not indicated  Peripheral Sensory Neuropathy: Moderate symptoms, limiting instrumental ADL  Dizziness: Mild unsteadiness or sensation of movement  Psychological/Emotional   Patient Coping: Accepting  Hematological/Lymphatic  Lymph (WDL): All lymph disorder elements are within defined limits  Dermatologic     Genitourinary/Reproductive  Genitourinary (WDL): Exceptions to WDL  Urinary Tract Pain: Mild pain (mild burning)  Reproductive     Pain              Currently in Pain: Yes  Pain Score (Initial OR Reassessment): 6  Pain Frequency: Constant/continuous  Location: left shoulder, knees and lower legs  Pain Intervention(s): Home medication (Aleve)  Response to Interventions: helpful  Accompanied by  Accompanied by: Alone      Objective:     Physical Exam    Vitals:    11/10/17 1003   BP: 134/64   Pulse: 77   Temp: 98.3  F (36.8  C)   TempSrc: Oral   SpO2: 97%   Weight: 151 lb 4.8 oz (68.6 kg)       Physical Exam     Head: Normocephalic, without obvious abnormality, atraumatic  Neck: no adenopathy and supple, symmetrical, trachea midline  Lungs: clear to auscultation bilaterally  Breasts:  expected post operative changes, no masses skin changes suggestive of recurrence or infection.   Heart: regular rate and rhythm  Skin: Skin color, texture, turgor normal. No rashes or lesions  Lymph nodes: Cervical, supraclavicular, and axillary nodes normal.  Extremities: mild loss of ROM left UE.   Neurologic: Grossly normal  Pysch: affect normal, thought content appropriate.      Recent Labs: No results found for this or any previous visit (from the past 168  hour(s)).    Imaging: Imaging results 6 weeks:Mr Breast Bilateral With Without Contras    Result Date: 10/20/2017  MR BREAST W WO CONTRAST BILATERAL 10/19/2017 11:47 AM INDICATION: Left breast cancer in the upper outer quadrant status post lumpectomy with positive deep margin in May 2017. Has since received adjuvant chemotherapy. TECHNIQUE: 3D high-resolution images, vascular subtraction images with 8 mL Gadavist and MIP images performed. The study was processed using a [employer] computer-aided detection system to optimize radiologist interpretation by generating multiplanar and 3D reconstructions, creating subtraction images from the dynamic contrast data, and computing tumor volumes and dimensions. COMPARISON: Mammogram and ultrasound on 5/15/2017 RIGHT BREAST: There is heterogeneous fibroglandular tissue. There is minimal background enhancement. No suspicious mass or enhancement. A marker was placed on the skin at the site of what is reported as a cyst by the patient. No underlying abnormality at  this site. The axilla is unremarkable. MIP images confirm these findings. LEFT BREAST: There is heterogeneous fibroglandular tissue. There is minimal background enhancement. There are lumpectomy changes in the upper outer quadrant with an associated 3.4 x 1.8 cm postoperative seroma. No suspicious mass or enhancement. In particular no suspicious findings along the posterior margin of the lumpectomy site. The axilla is unremarkable. MIP images confirm these findings.     Post lumpectomy changes on the left. No MRI evidence for malignancy in either breast. RIGHT BREAST: ACR BI-RADS Category 1: Negative. LEFT BREAST: ACR BI-RADS Category 2: Benign.      Pathology:   No results found for this or any previous visit (from the past 8760 hour(s)).      Signed by: Betty Pinzon MD MPH

## 2021-06-14 NOTE — PROGRESS NOTES
Met with Linda to review her plan of care and offer support.  She had been scheduled for radiation oncology CT simulation today, but she consumed food and drink less than three hours before scheduled contrast today, so this has been rescheduled.  I have requested a cab for her rides to and from simulation and confirmation numbers are [phone] and [phone] on Monday.  I informed Linda she may expect  at 8:00am for her 9:00am sim.  Will call on return. She expressed appreciation for this assistance. Jennifer Ventura RN

## 2021-06-14 NOTE — PROGRESS NOTES
Pt here for CT simulation with contrast. She unfortunately at lunch at 1230 today. She needs to be NPO for three hours (firm, because she is not sure if she's had CT contrast before), and she has a ride coming prior to 3:30. I will draw Creat/BUN today since her port is already accessed from infusion. Pt will reschedule simulation scan for Monday or Wednesday next week per Radha RTTs request.     Labs drawn, port flushed with 20cc NS and 6cc Hep-lock, deaccessed with needle intact.

## 2021-06-14 NOTE — PROGRESS NOTES
RADIATION ONCOLOGY WEEKLY TREATMENT VISIT NOTE      Assessment / Impression       1. Malignant neoplasm of upper-outer quadrant of left breast in female, estrogen receptor positive       Malignant neoplasm of upper-outer quadrant of left female breast    Staging form: Breast, AJCC 7th Edition    - Clinical: No stage assigned - Unsigned    - Pathologic stage from 6/14/2017: Stage IIIA (T2, N2a, cM0) - Signed by Joie Ferro MD on 6/14/2017     Tolerating radiation therapy well.  Patient states that she has low self-esteem and has had suicidal ideations..  She saw a psychologist yesterday.  Has an appointment with Josselyn Bowles 1/11/2018.  All questions and concerns addressed.      Plan:   Patient knows to call suicide hotline or 911 to be admitted If she feels like she may hurt herself.  Will have nurse navigator call to check in with her.        Continue radiation treatment as prescribed.  Radiation: Site: left breast  Stereotactic Radiosurgery: No  Stereotactic Radiosurgery: No  Concurrent Therapy: No  Today's Dose: 1980  Total Dose for Breast: 6040  Today's Fraction/Total Fraction Breast: 11/33        Subjective:      HPI: Linda Canada is a 62 y.o. female with    1. Malignant neoplasm of upper-outer quadrant of left breast in female, estrogen receptor positive         The following portions of the patient's history were reviewed and updated as appropriate: allergies, current medications, past family history, past medical history, past social history, past surgical history and problem list.      Objective:     Exam:     Vitals:    12/19/17 0953   BP: 143/83   Pulse: 78   Temp: 97.8  F (36.6  C)   TempSrc: Oral   SpO2: 99%   Weight: 146 lb 4.8 oz (66.4 kg)       Wt Readings from Last 8 Encounters:   12/19/17 146 lb 4.8 oz (66.4 kg)   12/12/17 148 lb (67.1 kg)   12/08/17 147 lb (66.7 kg)   12/05/17 147 lb 4.8 oz (66.8 kg)   12/05/17 148 lb (67.1 kg)   11/22/17 148 lb (67.1 kg)   11/10/17 151 lb 4.8 oz (68.6 kg)    10/27/17 157 lb (71.2 kg)       General: Alert and oriented, in no acute distress  Linda has no Erythema.    Treatment Summary to Date    Aria chart and setup information reviewed    Betty Pinzon MD

## 2021-06-14 NOTE — PROGRESS NOTES
Exam: SIM    Date: 11/20/2017  There were no vitals filed for this visit.    Please ask your patient the following questions before you give any injection of a contrast media. If someone other than the patient is responding to these questions, please indicate the respondent's name and relationship to the patient.    Respondent Name:                  Relationship to patient:    Name: Linda Howell any allergies:   Allergies   Allergen Reactions     Penicillins Rash       Does the patient have renal disease?     No  Does the patient have diabetes?   No  If patient has diabetes, is metformin or metformin combination drug currently prescribed (i.e. Actoplus Met, Avandmet, Fortamet, Glucophage, Glucovance, Glumetza, Junamet, Prandimet, Metaglip, or Riomet)?       No  Is the patient pregnant? No  Is the patient on dialysis? No  Does the patient have cancer? Yes:   Does the patient have a history of cancer? No  When was the patient diagnosed? current  Treatment: current  Date of last chemo treatment:     LAB RESULTS       CREATININE       Lab Results   Component Value Date    CREATININE 0.67 11/17/2017           (Normal Range: Male: 0.6-1.5 mg/dL  Female: 0.5-1.3mg/dL)   (A Creatinine result greater than 6.0 mg/dL is a Critical value-the ordering provider must be   notified)        LASTLAB(EGFR,GFRNONAA,GFRAA)@ ml/min/1.73M2   (Normal Range >60)         CT Only  If the eGFR is less than 30 the radiologist must be informed  If labs are abnormal, was the physician informed?  Yes   Staff s Initials HC      This procedure involves an intravenous contrast media injection.  IV started in the right upper chest port. Good blood return.   Power Port     Mishel Bustos

## 2021-06-14 NOTE — PROGRESS NOTES
[facility] Hematology and Oncology Progress Note    Patient: Linda Canada  MRN: [id number]  Date of Service:         Reason for Visit    Chief Complaint   Patient presents with     HE Cancer       Assessment and Plan  Malignant neoplasm of upper-outer quadrant of left female breast    Staging form: Breast, AJCC 7th Edition    - Clinical: No stage assigned - Unsigned    - Pathologic stage from 6/14/2017: Stage IIIA (T2, N2a, cM0) - Signed by Joie Ferro MD on 6/14/2017    1. Breast cancer, stage IIIA ([id number]): pt has completed 6 cycles of TCHP and is here today to continue on maintenance herceptin. She will get that javier 3 weeks.  Her MUGA scan is normal.  She will continue her maintenance Herceptin.  See Dr. tanner in 6 weeks.  He is continuing on radiation.  She will likely start an AI after radiation is done. I did tell them she will take that for 5-10 years.     2. Positive margin from surgery: pt met with Dr. Kwong and because of the location and the proximity to the chest wall, she does not think she could remove more. She did get an MRI and it does not show any abnormalities. Radiation is aware and will alter radiation due to it.     3. Anemia: chemo induced and usually cumulative. Overall asymptomatic except fatigue. Continue to monitor. No bleeding or bruising issues.  Has improved significantly since being done with chemo will continue to monitor    ECOG Performance   ECOG Performance Status: 1     Distress Assessment  Distress Assessment Score: Extreme distress: continue to see therapist regularly.  He has an appointment next week.  No change.    Pain  Currently in Pain: Yes  Pain Score (Initial OR Reassessment): 8  Location: left breast and shoulder: Using heat and Aleve.  Not interested in anything stronger.  Continue to monitor.  I will try to see if we can do physical therapy in our building roughly the same time is radiation so she does not have to make extra appointments.      Problem  List    1. Scar condition and fibrosis of skin     2. Malignant neoplasm of upper-outer quadrant of left breast in female, estrogen receptor positive  Infusion Appointment    Infusion Appointment    CC OFFICE VISIT LONG    DISCONTINUED: sodium chloride 0.9% 250 mL infusion    DISCONTINUED: trastuzumab 400 mg in sodium chloride 0.9% 250 mL chemo (HERCEPTIN)    DISCONTINUED: sodium chloride 0.9 % flush 20 mL (NS)    DISCONTINUED: heparin 100 unit/mL lockflush (PF) porcine 300-600 Units    DISCONTINUED: diphenhydrAMINE injection 50 mg (BENADRYL)    DISCONTINUED: famotidine 20 mg/2 mL injection 20 mg (PEPCID)    DISCONTINUED: hydrocortisone sod succ (PF) 100 mg/2 mL injection 100 mg    DISCONTINUED: acetaminophen tablet 1,000 mg (TYLENOL)   3. Post-mastectomy lymphedema syndrome     4. Contracture, left shoulder     5. Anxiety        ______________________________________________________________________________    History of Present Illness    Diagnosis  5/31/17  Stage IIIA (pT2, pN2a, M0) invasive ductal carcinoma of the left breast   - ER (high positive, 80-90%), MT (high positive, 90%) HER2 (+ by FISH by avg HER2 signals 5.3, HER2/CEP17 ratio 2.3)  - Horacio grade 3  - Tumor size: 26 mm  - Margins positive and posterior margin for invasive component and positive for posterior margin for DCIS.  - Angiolymphatic invasion present.  - 5/12 left axillary lymph node positive for carcinoma  - associated DCIS, grade 2, solid and cribriform with focal comedonecrosis.  20%,       PET scan did not show distant metastases.      Therapy to date:  5/31/17 - left partial mastectomy and Left axillary lymph node dissection  6/23/17- 10/6/17-C#1-6 TCHP; added neulasta support to TCHP starting C#2  Plan is to complete adjuvant chemotherapy then to reevaluate for a reexcision lumpectomy later.     Cardiac MUGA  6/16/17- LVEF 59.4%  9/15/17- LVEF 59%     History of Present Illness  Pt is here today to continue on maintenance  herceptin. She is having left shoulder pain and limited ROM. She tried some PT, but has now started radiation and it is too many appt. She is using aleve and heat which helps.  She continues to have anxiety issues.  Continues to have neuropathy in her feet as well that does not seem to really be improving a whole lot.      Pain Status  Currently in Pain: Yes    Review of Systems    Constitutional  Constitutional (WDL): Exceptions to WDL  Fatigue: Fatigue not relieved by rest - Limiting instrumental ADL  Neurosensory  Neurosensory (WDL): Exceptions to WDL  Peripheral Motor Neuropathy: Moderate symptoms, limiting instrumental ADL  Ataxia: Asymptomatic, clinical or diagnostic observations only, intervention not indicated  Peripheral Sensory Neuropathy: Moderate symptoms, limiting instrumental ADL (hands and feet. )  Eye   Eye Disorder (WDL): Exceptions to WDL  Blurred Vision: Intervention not indicated  Watering Eyes: Intervention not indicated  Ear  Ear Disorder (WDL): Exceptions to WDL  Tinnitus: Mild symptoms, intervention not indicated  Cardiovascular  Cardiovascular (WDL): Exceptions to WDL  Palpitations: Definition: A disorder characterized by inflammation of the muscle tissue of the heart. (anxiety)  Pulmonary  Respiratory (WDL): Exceptions to WDL (runny nose)  Cough: Mild symptoms, nonprescription intervention indicated  Dyspnea: Shortness of breath with moderate exertion  Gastrointestinal  Gastrointestinal (WDL): Exceptions to WDL  Anorexia: Loss of appetite without alteration in eating habits  Nausea: Loss of appetite without alteration in eating habits  Dysgeusia: Altered taste but no change in diet  Genitourinary  Genitourinary (WDL): Exceptions to WDL  Urinary Tract Pain: Mild pain  Lymphatic  Lymph (WDL): Exceptions to WDL  Musculoskeletal and Connective Tissue  Musculoskeletal and Connetive Tissue Disorders (WDL): Exceptions to WDL  Arthralgia: Mild pain  Muscle Weakness : Symptomatic, perceived by  patient but not evident on physical exam  Myalgia: Mild pain  Integumentary  Integumentary (WDL): Exceptions to WDL  Alopecia: Hair loss of >50% normal for that individual that is readily apparent to others, a wig or hair piece is necessary if the patient desires to completely camouflage the hair loss, associated with psychosocial impact  Pruritus: Mild or localized, topical intervention indicated (after treatment)  Patient Coping  Patient Coping: Anxiety  Distress Assessment  Distress Assessment Score: Extreme distress  Accompanied by  Accompanied by: Alone  Oral Chemo Adherence       Past History  Past Medical History:   Diagnosis Date     Anxiety      Breast cancer 05/31/2017    left breast     Panic attack        PHYSICAL EXAM:  /75  Pulse 75  Temp 98.1  F (36.7  C) (Oral)   Wt 147 lb (66.7 kg)  SpO2 98%  BMI 27.78 kg/m2  GENERAL: no acute distress. Cooperative in conversation. Here alone  HEENT: pupils are equal, round and reactive. Oromucosa is clean and intact. No ulcerations or mucositis noted. No bleeding noted.  RESP: lungs are clear bilaterally per auscultation. Regular respiratory rate. No wheezes or rhonchi.  CV: Regular, rate and rhythm. No murmurs.  ABD: soft, nontender. Positive bowel sounds. No organomegaly.   MUSCULOSKELETAL: No lower extremity swelling.  Some pain and limited range of motion with her left shoulder  NEURO: non focal. Alert and oriented x3.   PSYCH: within normal limits. No depression or anxiety.  SKIN: warm dry intact, port is CDI  LYMPH: no cervical, supraclavicular lymphadenopathy        Lab Results    Recent Results (from the past 168 hour(s))   NM Muga   Result Value Ref Range    MUGA EF 56.57 %    MUGA PRIOR EF 59.25 %   Comprehensive Metabolic Panel   Result Value Ref Range    Sodium 144 136 - 145 mmol/L    Potassium 3.9 3.5 - 5.0 mmol/L    Chloride 108 (H) 98 - 107 mmol/L    CO2 27 22 - 31 mmol/L    Anion Gap, Calculation 9 5 - 18 mmol/L    Glucose 98 70 - 125  mg/dL    BUN 16 8 - 22 mg/dL    Creatinine 0.71 0.60 - 1.10 mg/dL    GFR MDRD Af Amer >60 >60 mL/min/1.73m2    GFR MDRD Non Af Amer >60 >60 mL/min/1.73m2    Bilirubin, Total 0.3 0.0 - 1.0 mg/dL    Calcium 9.8 8.5 - 10.5 mg/dL    Protein, Total 7.4 6.0 - 8.0 g/dL    Albumin 3.5 3.5 - 5.0 g/dL    Alkaline Phosphatase 91 45 - 120 U/L    AST 14 0 - 40 U/L    ALT 13 0 - 45 U/L   Magnesium (add-ons/treatment plan)   Result Value Ref Range    Magnesium 1.8 1.8 - 2.6 mg/dL   HM1 (CBC with Diff)   Result Value Ref Range    WBC 4.3 4.0 - 11.0 thou/uL    RBC 3.65 (L) 3.80 - 5.40 mill/uL    Hemoglobin 11.3 (L) 12.0 - 16.0 g/dL    Hematocrit 34.2 (L) 35.0 - 47.0 %    MCV 94 80 - 100 fL    MCH 31.0 27.0 - 34.0 pg    MCHC 33.0 32.0 - 36.0 g/dL    RDW 12.9 11.0 - 14.5 %    Platelets 175 140 - 440 thou/uL    MPV 10.7 8.5 - 12.5 fL    Neutrophils % 66 50 - 70 %    Lymphocytes % 26 20 - 40 %    Monocytes % 6 2 - 10 %    Eosinophils % 1 0 - 6 %    Basophils % 0 0 - 2 %    Neutrophils Absolute 2.9 2.0 - 7.7 thou/uL    Lymphocytes Absolute 1.1 0.8 - 4.4 thou/uL    Monocytes Absolute 0.3 0.0 - 0.9 thou/uL    Eosinophils Absolute 0.1 0.0 - 0.4 thou/uL    Basophils Absolute 0.0 0.0 - 0.2 thou/uL       Imaging    Nm Muga    Result Date: 12/5/2017    Normal study.   The left ventricular ejection fraction is 56.57%.   The measured left ventricular ejection fraction on the prior study date of 9/15/2017 was 59.25%.      Nm Pet Ct Skull To Mid Thigh    Result Date: 11/22/2017  [facility] PET FDG/CT 11/22/2017 2:29 PM INDICATION: Invasive ductal carcinoma upper outer quadrant left breast. Status post left partial mastectomy with left axillary lymph node dissection and chemotherapy. Follow-up and subsequent treatment strategy.. TECHNIQUE: Serum glucose level 78 mg/dL. One hour post intravenous administration of 10.7 mCi F-18 FDG, PET imaging was performed from the skull base to the mid thighs utilizing attenuation correction with  concurrent axial CT and PET/CT image fusion. Dose reduction techniques were used. COMPARISON: 06/13/2017. FINDINGS: There is physiologic activity in the neck. Right Port-A-Cath remains in good position. Posttreatment changes left breast and left axilla. The mildly FDG avid left axillary lymph node demonstrated on the prior scan is no longer demonstrated and there are no sites of suspicious FDG activity within the chest. There is physiologic activity in the abdomen and pelvis. Degenerative disc disease throughout the spine.     CONCLUSION: No evidence of disease.      Signed by: Karen Rose, CNP

## 2021-06-14 NOTE — PROGRESS NOTES
Pt here ambulatory by herself for consult with Dr. Pinzon.  present throughout encounter, but pt speaks English very well. She c/o left shoulder and back pain for which she still goes to PT. C/O many s/e from chemo such as hair loss, watery eyes, and moderate to severe neuropathy in hands and feet. She was given the new patient radiation folder which included Dr. Pinzon's bio card, [facility] Class and support group schedule, [facility] RT booklet, [facility] RT info, breast specific educ sheet, and ROM/exercise sheets for breast pts. She verbalized her understanding. She does not want RT at , prefers [facility].

## 2021-06-14 NOTE — PROGRESS NOTES
"Met with Linda in radiation oncology where she is arriving for daily rad treatment.  I gave her a schedule of upcoming appointments and highlighted appointment with oncology psychotherapist Josselyn Bowles on January 11, 2018 at 9:00am.  Linda also meets with Jesusita at [facility] who she identifies as a psychologist.  She had been meeting with a psychotherapist, but recent appointment was cancelled due to therapist being out of town.  Linda states Jesusita is hoping to find patient a therapist closer to her home.  Linda admits to feeling overwhelmed with treatment and medical appointments.  She has stopped physical therapy for now, at least until radiation therapy is concluded.      I also reminded Linda to telephone Roxie through her insurance company to set up January appointments.      Linda states she tries not to lean on her partner Alam too much.  Alma is now working and is called on to work extra shifts, and has no vacation saved up yet.  Although Linda and Alma would love to travel to [place] to be with family over holidays, this is not possible for Alma, and Linda does not feel she can travel at the moment due to treatment.  Linda has three sisters in [place], and nephews and nieces residing in [place].  Linda acknowledges she has few friends here in [place] and got sick shortly after they arrived her so has not had many opportunities to make new friends.  I encouraged her to attend Breast Cancer Support Group and gave her holiday gift prepared for members.      She complains of back pain and gestures to thoracic level left lateral to the spine.  She does state, \"it's from the neck all the way down.\"  She rates this as 6/10 and uses ibuprofen only for modest, not complete, relief.   She mentions an increase in her Prozac dose recently, and a reminded from psychologist at [facility] to use the lorazepam dose if needed.  This has been reported to rad onc nurse.    Linda is aware I will be out of the " office from 12/21-12/28.  I gave her a printout with information about suicide hotline and phone number.  She denies any plan to harm herself at this time. Jennifer Ventura RN

## 2021-06-14 NOTE — PROGRESS NOTES
"[facility] Daily Progress     Patient Name: Linda Canada  Date: 2017  Visit #: 8 /10  PTA visit #:  2    Date of evaluation: 10/10/2017  Referral Diagnosis: left shoulder decreased ROM  Referring provider: Margaret Domingo  Visit Diagnosis:     ICD-10-CM    1. Contracture, left shoulder M24.512    2. Post-mastectomy lymphedema syndrome I97.2    3. Scar condition and fibrosis of skin L90.5          Assessment:     Good progress with increase of left shoulder ROM. Left arm swelling is stable and patient is compliant with the wear of the compression sleeve.    Patient is benefitting from skilled physical therapy and is making steady progress toward functional goals.  Patient is appropriate to continue with skilled physical therapy intervention, as indicated by initial plan of care.    Goal Status: On going  Pt. will be independent with home exercise program in : 4 weeks;Progressing toward  Patient will reach / maintain arm movement: forward;overhead;behind;for work;for home chores;for dressing;for other activity;with full ROM;Progressing toward  Patient will have decreased fibrosis, scar tissue for improved lymphatic mobilitiy : in 4 weeks;Progressing toward  Patient will perform, verbalize self-management of: skin care;self-monitoring;exercise;massage;infection prevention;in 4 weeks;Progressing toward    Plan / Patient Education:     Continue with initial plan of care.  Progress with home program as tolerated. MFR, nerve glides, therapeutic exercises, stretching and strengthening  Patient will continue with independent home exercises and will return after her radiation treatments.    Subjective:     Pain Ratin  Pt reports she will start radiation therapy next week and will continue with infusions.      Objective:     Caregiver present: No    Left shoulder ROM:  Flexion: 172 degrees \"tight\" end feel  AB: 155 degrees \"tight\" end feel    Observation of swelling: left upper extremity is better " "per pt    Volume measurements taken:  No      Skin condition is:  Intact, dry.    Compression:  Garment(s) worn to treatment.One piece arm sleeve.    Medication for infection:      Treatment Today     TREATMENT MINUTES COMMENTS   Evaluation     Self-care/ Home management 5 Discussed how to manage increased episodes of achiness, general self care   Manual therapy 35 Position supine:  Myofascial release L shoulder and periscapular region, teres major, pec minor, upper trap.  Cervical paraspinals, scalenes, SCM, gentle  L arm pull , assisted stretching           Neuromuscular Re-education     Therapeutic Activity     Therapeutic Exercises 15 Exercises:  Exercise #1: supine pectoral  stretch, latissimus dorsi, breathing.Reviewed, performed with the therapist and on her own. Pt to use pillow under arm for increased comfort  Comment #1: seated shoulder rolls, nerve glides, provided written instructions.  Exercise #2: nu step level 1 x 5'minutes, seat 6, patient wears her compression sleeve during exercises.  Exercise #3: scap retraction 5\" x 10  Comment #3: B shoulder ER with L1 band x 10-30 reps, sleeve on      Gait training     Modality__________________                Total 55    Blank areas are intentional and mean the treatment did not include these items.       Chitra Palomino PT,CLT-ROBERT  11/13/2017    "

## 2021-06-14 NOTE — PROGRESS NOTES
Pt here for herceptin. PT states neuropathy continues to be bad in hands and feet. Energy gradually getting better. Port accessed sterile technique, brisk blood return/smooth ns flush. herceptin infused over 30 minutes then tubing flushed with ns. Port capped and left accessed for radiation simulation. PT dc'd steady gait. Pt tolerated infusion without any side effects.

## 2021-06-14 NOTE — PROGRESS NOTES
[facility] Discharge Summary  Patient Name: Linda Canada  Date: 12/12/2017  Referral Diagnosis: left shoulder decreased ROM  Referring provider: Margaret Domingo  Visit Diagnosis:   1. Contracture, left shoulder     2. Post-mastectomy lymphedema syndrome     3. Scar condition and fibrosis of skin         Goals:Met  Pt. will be independent with home exercise program in : 4 weeks;Progressing toward  Patient will reach / maintain arm movement: forward;overhead;behind;for work;for home chores;for dressing;for other activity;with full ROM;Progressing toward  Patient will have decreased fibrosis, scar tissue for improved lymphatic mobilitiy : in 4 weeks;Progressing toward  Patient will perform, verbalize self-management of: skin care;self-monitoring;exercise;massage;infection prevention;in 4 weeks;Progressing toward    Patient was seen for 08 visits from 10-9-17 to 11-13-17 with 0 missed appointments.  The patient met goals and has demonstrated understanding of and independence in the home program for self-care, and progression to next steps.  She will initiate contact if questions or concerns arise.  The patient was instructed to follow up with physician's clinic.    Therapy will be discontinued at this time.  The patient will need a new referral to resume.    Thank you for your referral.  Chitra Palomino PT  12/12/2017  1:59 PM

## 2021-06-15 NOTE — PROGRESS NOTES
RADIATION ONCOLOGY WEEKLY TREATMENT VISIT NOTE      Assessment / Impression       1. Malignant neoplasm of upper-outer quadrant of left breast in female, estrogen receptor positive       Malignant neoplasm of upper-outer quadrant of left female breast    Staging form: Breast, AJCC 7th Edition    - Clinical: No stage assigned - Unsigned    - Pathologic stage from 2017: Stage IIIA (T2, N2a, cM0) - Signed by Joie Ferro MD on 2017          ER Status: Positive          NH Status: Positive          HER2 Status: Positive     Tolerating radiation therapy well.  All questions and concerns addressed.    Plan:     Continue radiation treatment as prescribed.    Discussed with the patient about proper skin care.    Subjective:      HPI: Linda Canada is a 63 y.o. female with    1. Malignant neoplasm of upper-outer quadrant of left breast in female, estrogen receptor positive         The following portions of the patient's history were reviewed and updated as appropriate: allergies, current medications, past family history, past medical history, past social history, past surgical history and problem list.    Assessment                  Body Site: Breast                           Site: left breast  Stereotactic Radiosurgery: No  Concurrent Therapy: No  Today's Dose: 5240  Total Dose for Breast: 6040  Today's Fraction/Total Fraction Breast:   Drainage: 0: Absent                                            Sexuality Alteration                 Emotional Alteration Copin: Ineffective (depressed)  Comfort Alteration KPS: 70% Cannot do active work, but can care for self  Fatigue (ONS scale) : 5: Moderate Fatigue  Pain Location: left shoulder  Pain Intensity. Rate degree of pain ranging from 0 (no pain) to 10 (severe pain) : 8  Pain Description: Ache - Muscular type ache  Pain Intervention: 2: Nonsteroidal anti-inflammatory agents or non-opiods  Effectiveness of pain intervention: 1: Pain relieved  25%  Hot Flashes and/or Flushes: 0: None   Nutrition Alteration Anorexia: 1: Loss of appetite  Nausea: 1: Able to eat  Vomitin: None  Skin Alteration Skin Sensation: 1:Pruitis  Skin Reaction: 2: Bright erythema  AUA Assessment                                  Accompanied by       Objective:     Exam:     Vitals:    18 1029   BP: 131/75   Pulse: 72   Temp: 98  F (36.7  C)   TempSrc: Oral   SpO2: 99%   Weight: 146 lb 14.4 oz (66.6 kg)       Wt Readings from Last 8 Encounters:   18 146 lb 14.4 oz (66.6 kg)   18 144 lb 12.8 oz (65.7 kg)   18 145 lb (65.8 kg)   17 156 lb (70.8 kg)   17 148 lb 12.8 oz (67.5 kg)   17 146 lb 4.8 oz (66.4 kg)   17 148 lb (67.1 kg)   17 147 lb (66.7 kg)       General: Alert and oriented, in no acute distress  Linda has mild Erythema.  Aria chart and setup information reviewed    Lakshmi Merchant MD

## 2021-06-15 NOTE — PROGRESS NOTES
RADIATION ONCOLOGY WEEKLY TREATMENT VISIT NOTE      Assessment / Impression       1. Malignant neoplasm of upper-outer quadrant of left breast in female, estrogen receptor positive       Malignant neoplasm of upper-outer quadrant of left female breast    Staging form: Breast, AJCC 7th Edition    - Clinical: No stage assigned - Unsigned    - Pathologic stage from 2017: Stage IIIA (T2, N2a, cM0) - Signed by Joie Ferro MD on 2017   Tolerating radiation therapy well.  All questions and concerns addressed.      Plan:     Continue radiation treatment as prescribed.  Radiation: Site: left breast  Stereotactic Radiosurgery: No  Stereotactic Radiosurgery: No  Concurrent Therapy: No  Today's Dose: 4320  Total Dose for Breast: 6040  Today's Fraction/Total Fraction Breast:         Subjective:      HPI: Linda Canada is a 63 y.o. female with    1. Malignant neoplasm of upper-outer quadrant of left breast in female, estrogen receptor positive         The following portions of the patient's history were reviewed and updated as appropriate: allergies, current medications, past family history, past medical history, past social history, past surgical history and problem list.    Assessment                  Body Site: Breast                           Site: left breast  Stereotactic Radiosurgery: No  Concurrent Therapy: No  Today's Dose: 4320  Total Dose for Breast: 6040  Today's Fraction/Total Fraction Breast:   Drainage: 0: Absent                                            Sexuality Alteration                 Emotional Alteration Copin: Ineffective (depressed)  Comfort Alteration KPS: 70% Cannot do active work, but can care for self  Fatigue (ONS scale) : 5: Moderate Fatigue  Pain Location: left shoulder  Pain Intensity. Rate degree of pain ranging from 0 (no pain) to 10 (severe pain) : 8  Pain Description: Ache - Muscular type ache  Pain Intervention: 2: Nonsteroidal anti-inflammatory agents  or non-opiods  Effectiveness of pain intervention: 1: Pain relieved 25%  Hot Flashes and/or Flushes: 0: None   Nutrition Alteration Anorexia: 1: Loss of appetite  Nausea: 1: Able to eat  Vomitin: None  Skin Alteration Skin Sensation: 1:Pruitis  Skin Reaction: 1: Faint erythema or dry desquamation  AUA Assessment                                  Accompanied by       Objective:     Exam:     Vitals:    18 0952   BP: 137/72   Pulse: 70   Temp: 97.7  F (36.5  C)   TempSrc: Oral   SpO2: 99%   Weight: 144 lb 12.8 oz (65.7 kg)       Wt Readings from Last 8 Encounters:   18 144 lb 12.8 oz (65.7 kg)   18 145 lb (65.8 kg)   17 156 lb (70.8 kg)   17 148 lb 12.8 oz (67.5 kg)   17 146 lb 4.8 oz (66.4 kg)   17 148 lb (67.1 kg)   17 147 lb (66.7 kg)   17 147 lb 4.8 oz (66.8 kg)       General: Alert and oriented, in no acute distress  Linda has mild Erythema.    Treatment Summary to Date    Aria chart and setup information reviewed    Betty Pinzon MD
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
## 2021-06-15 NOTE — PROGRESS NOTES
Pt ambulates to infusion center for treatment.  IVAD accessed w/good blood return noted.  Herceptin infused as ordered without complication.  IVAD flushed w/NS et heparin and needle deaccessed.  Pt left clinic stable to lobby.  Plan RTC as scheduled.

## 2021-06-15 NOTE — PROGRESS NOTES
RADIATION ONCOLOGY WEEKLY TREATMENT VISIT NOTE      Assessment / Impression       1. Malignant neoplasm of upper-outer quadrant of left breast in female, estrogen receptor positive       Malignant neoplasm of upper-outer quadrant of left female breast    Staging form: Breast, AJCC 7th Edition    - Clinical: No stage assigned - Unsigned    - Pathologic stage from 6/14/2017: Stage IIIA (T2, N2a, cM0) - Signed by Joie Ferro MD on 6/14/2017   Tolerating radiation therapy well.  All questions and concerns addressed.    Plan:     Continue radiation treatment as prescribed.  Radiation: Site: left breast  Stereotactic Radiosurgery: No  Stereotactic Radiosurgery: No  Concurrent Therapy: No  Today's Dose: 2700  Total Dose for Breast: 6040  Today's Fraction/Total Fraction Breast: 15/33      Subjective:      HPI: Linda Canada is a 62 y.o. female with    1. Malignant neoplasm of upper-outer quadrant of left breast in female, estrogen receptor positive         The following portions of the patient's history were reviewed and updated as   ppropriate: allergies, current medications, past family history, past medical   history, past social history, past surgical history and problem list.    She reports being significantly fatigued. This may be a component of her  depressed mood.        Objective:     Exam:     Vitals:    12/26/17 0934   BP: 144/81   Pulse: 66   Temp: 98.1  F (36.7  C)   TempSrc: Oral   SpO2: 100%   Weight: 148 lb 12.8 oz (67.5 kg)       Wt Readings from Last 8 Encounters:   12/26/17 148 lb 12.8 oz (67.5 kg)   12/19/17 146 lb 4.8 oz (66.4 kg)   12/12/17 148 lb (67.1 kg)   12/08/17 147 lb (66.7 kg)   12/05/17 147 lb 4.8 oz (66.8 kg)   12/05/17 148 lb (67.1 kg)   11/22/17 148 lb (67.1 kg)   11/10/17 151 lb 4.8 oz (68.6 kg)       General: Alert and oriented, in no acute distress  Linda has mld erythema of left breast..    Treatment Summary to Date    Aria chart and setup information reviewed    Bharat Fagan  MD

## 2021-06-15 NOTE — PROGRESS NOTES
"ASSESSMENT: Ingrown right great toenail, neuropathy from chemotherapy    PLAN: Good relief with simple nail debridement today.  Continue with soaks and topical antibiotics.  If symptoms persist, return to clinic for partial nail avulsion.      SUBJECTIVE: New patient visit at the [facility] regarding pain at the medial border of her right great toenail.  She missed this initially because of chemotherapy-induced neuropathy.  Redness and swelling persisted.  Some relief with soaks, but still consistent sharp pain.  She recalls many years ago having an episode of ingrowing toenail that required the nail be removed.  She does not recall which toe it was.  She continues chemo and radiation therapy for breast cancer.    PHYSICAL EXAM:  /78  Pulse 64  Resp 16  Ht 5' 4\" (1.626 m)  Wt 145 lb (65.8 kg)  SpO2 100%  BMI 24.89 kg/m2  General: Pleasant 63 y.o. female in no acute distress.  Vascular: DP pulses are palpable. PT pulses are palpable. Pedal hair is diminished. Feet are warm to the touch.  Cardiac: Pulse is regular.  Lymphatic: No edema at the ankles.  Neuro: Sensation in the feet is altered. Patient describes paresthesias.  Derm: The medial border of the right great toenail is incurved with surrounding erythema and edema.  Sharp nail point was debrided back, relieving pain.  No pus.  Musculoskeletal: Adequate passive range of motion in foot and ankle joints.    Past Medical History:   Diagnosis Date     Anxiety      Breast cancer 05/31/2017     Neuropathy      Panic attack        She has a past surgical history that includes Breast surgery (Left, 05/31/2017).    Allergies   Allergen Reactions     Penicillins Rash       Current Outpatient Prescriptions   Medication Sig Dispense Refill     acetaminophen (TYLENOL) 500 MG tablet Take 1,000 mg by mouth every 4 (four) hours as needed for pain.       docusate sodium (COLACE) 100 MG capsule Take 100 mg by mouth at bedtime as needed for constipation.       " FLUoxetine (PROZAC) 20 MG capsule Take 40 mg by mouth daily.        ibuprofen (ADVIL,MOTRIN) 200 MG tablet Take 400 mg by mouth every 6 (six) hours as needed for pain.       lidocaine-prilocaine (EMLA) cream Apply 30-45 minutes before the port access. 30 g 3     loperamide (IMODIUM A-D) 2 mg tablet Start with 4 mg, followed by 2 mg after each loose stool (maximum: 16 mg/day) 30 tablet 0     LORazepam (ATIVAN) 0.5 MG tablet Take 0.5 mg by mouth daily as needed.        mometasone (ELOCON) 0.1 % lotion Apply PRN for itch 60 mL 2     MULTIVITAMIN WITH MINERALS (HAIR,SKIN AND NAILS ORAL) Take 2 capsules by mouth daily.       naproxen (NAPROSYN) 375 MG tablet        naproxen sodium (ALEVE) 220 MG tablet Take 220 mg by mouth as needed for pain.       NEOMYCIN-BACITRACIN-POLYMYXIN ointment        omeprazole (PRILOSEC) 20 MG capsule Take 20 mg by mouth daily.       QUEtiapine (SEROQUEL) 25 MG tablet 25 mg at bedtime as needed.        ranitidine (ZANTAC) 150 MG tablet Take 150 mg by mouth. prn       sulfamethoxazole-trimethoprim (SEPTRA DS) 800-160 mg per tablet Take 1 tablet by mouth 2 (two) times a day.        traZODone (DESYREL) 50 MG tablet Take 50 mg by mouth at bedtime.        Current Facility-Administered Medications   Medication Dose Route Frequency Provider Last Rate Last Dose     heparin 100 unit/mL lockflush (PF) porcine 300-600 Units  300-600 Units Intravenous PRN Bharat Fagan MD   600 Units at 11/20/17 0919       Family History:  family history includes Diabetes in her sister; Hypertension in her sister; Lung cancer (age of onset: 78) in her father; No Medical Problems in her mother; Skin cancer in her sister.    Social History:  Reviewed, and she reports that she quit smoking about 15 years ago. Her smoking use included Cigarettes. She has a 18.00 pack-year smoking history. She has never used smokeless tobacco. She reports that she does not drink alcohol or use illicit drugs.    Review of Systems:  A 12  point comprehensive review of systems was negative except as noted.

## 2021-06-15 NOTE — PROGRESS NOTES
Pt ambulates to infusion center for treatment.  IVAD accessed w/good blood return noted.  Herceptin infused as ordered without complication.  IVAD flushed w/NS et heparin and needle deaccessed.  Pt left clinic stable.  Plan RTC as scheduled.

## 2021-06-15 NOTE — PROGRESS NOTES
RADIATION ONCOLOGY WEEKLY TREATMENT VISIT NOTE      Assessment / Impression       1. Malignant neoplasm of upper-outer quadrant of left breast in female, estrogen receptor positive       Malignant neoplasm of upper-outer quadrant of left female breast    Staging form: Breast, AJCC 7th Edition    - Clinical: No stage assigned - Unsigned    - Pathologic stage from 6/14/2017: Stage IIIA (T2, N2a, cM0) - Signed by Joie Ferro MD on 6/14/2017   Tolerating radiation therapy well.  All questions and concerns addressed.    Patient complains of getting back cramps.   Plan:   Over sedated - could barely stay awake during appointment. Denies suicidal ideation  Reviewed meds and partner fills pill boxes.   1) D/C Vistaril -   2) Reserve Lorazepam for panic attack only. Should not be taken if tired  3) Contact Jesusita for instructions of Seroquel/Trazodone at night.     Patient instruction sheet with above given.     Continue radiation treatment as prescribed.  Encouraged patient to increase activity/exercise and ROM exercises.     Patient to bring in all meds to confirm dosages.      Radiation: Site: left breast  Stereotactic Radiosurgery: No  Stereotactic Radiosurgery: No  Concurrent Therapy: No  Today's Dose: 3420  Total Dose for Breast: 6040  Today's Fraction/Total Fraction Breast: 19/33        Subjective:      HPI: Linda Canada is a 62 y.o. female with    1. Malignant neoplasm of upper-outer quadrant of left breast in female, estrogen receptor positive         The following portions of the patient's history were reviewed and updated as appropriate: allergies, current medications, past family history, past medical history, past social history, past surgical history and problem list.      Objective:     Exam:     Vitals:    01/02/18 0954   BP: 136/70   Pulse: 66   Temp: 97.9  F (36.6  C)   TempSrc: Oral   SpO2: 99%   Weight: 145 lb (65.8 kg)       Wt Readings from Last 8 Encounters:   01/02/18 145 lb (65.8 kg)    12/28/17 156 lb (70.8 kg)   12/26/17 148 lb 12.8 oz (67.5 kg)   12/19/17 146 lb 4.8 oz (66.4 kg)   12/12/17 148 lb (67.1 kg)   12/08/17 147 lb (66.7 kg)   12/05/17 147 lb 4.8 oz (66.8 kg)   12/05/17 148 lb (67.1 kg)       General: Sleepy and oriented, in no acute distress  Linda has mild Erythema.    Treatment Summary to Date    Aria chart and setup information reviewed    Betty Pinzon MD

## 2021-06-15 NOTE — PROGRESS NOTES
Met with Linda to check in, especially about her follow up with psychologist about her medications.  She states she does not have an appointment scheduled but intends to call to schedule appointment with her at [facility] to clarify dosing and use of anti-anxiety medications.      Linda is discouraged about her social security disability application supposedly in process through [facility].  She state she recently mailed paperwork to [place], and took documentation in person to SSD office last Friday.  Unfortunately she cannot produce correspondence or documentation to help me understand where this is in the process.       Linda is considering moving to [place] believing that will fast-forward her SSD application.  I have offered to touch base with [facility] and have left a message for  Anyi Pierre at [phone] to gain her assistance.

## 2021-06-15 NOTE — PROGRESS NOTES
Met with Linda in rad onc reception where she has just completed treatment for today.  Informed her I have applied for 2018 [facility]'s Fund.  (This was subsequently awarded.)    Informed her I have reached out to Anyi Pierre of [facility] with no response yet.  Linda states she has NOT telephoned the therapist yet, and I encouraged her to do so at ASAP.  Jennifer Ventura RN

## 2021-06-15 NOTE — PROGRESS NOTES
"Date Of Service: 12/28/2017    Date last Seen:  9/25/2017    PCP: Toña Barrera PA-C    Impression:   1.  Left arm swelling and left arm/shoulder tightness  2.  Secondary post mastectomy lymphedema  3.  Left shoulder contracture with cording  4.  Breast carcinoma (5/2017 left lumpectomy with LND, chemo/rad)     Plan:        1.  Questions were answered.  2.  Continue exercise for range of motion of left shoulder.  3.  While she is still undergoing treatment she will continue to wear the compression as much as she is able to tolerate.  4.  Has the Dragon Diva information.  5.  She promised me that she would not hurt herself.  If she felt she might she agreed to talk to her psychologist and/or go to the ED.   6.  Follow up with me in early March 2017, or when needed.    Time spent with patient 25 minutes, with greater than 50% time in consultation, education and coordination of care.  _____________________________________________________________________    Chief Complaint:  left arm swelling and left arm/shoulder tightness     History of Present Illness:  Linda Canada returns to the [facility] for her  left arm swelling and left arm/shoulder tightness and numbness due to post mastectomy lymphedema with cording in the left axilla after being diagnosed with left breast carcinoma in May of 2017.  Specifically, per her oncologist Dr. Ferro she had \"Stage IIIA (pT2, pN2a, M0) invasive ductal carcinoma of the left breast- grade 3, ER (high positive), WI (positive) and HER2 (positive) with associated high-grade DCIS.  Final margins for invasive carcinoma and DCIS are positive.  S/p left breast lumpectomy, left axillary sentinel lymph node biopsy and a left axillary lymph node dissection on 5/31/17.  She is done with chemotherapy and is now getting radiation.  She went to therapy to learn the exercises to keep the left shoulder stretched.  She has been trying to do her exercises and has the compression sleeve.  " There is no new unexplained weight loss, loss of appetite, nausea and/or vomiting, shortness of breath, weight loss and chest pain. She is having peripheral neuropathic changes from the chemotherapy. She has been having a hard time psychologically dealing with things.  She is regularly seeing her psychologist.        Past Medical History:   Diagnosis Date     Anxiety      Breast cancer 05/31/2017    left breast     Neuropathy      Panic attack        Past Surgical History:   Procedure Laterality Date     BREAST SURGERY Left 05/31/2017    left lumpectomy         Current Outpatient Prescriptions:      acetaminophen (TYLENOL) 500 MG tablet, Take 1,000 mg by mouth every 4 (four) hours as needed for pain., Disp: , Rfl:      docusate sodium (COLACE) 100 MG capsule, Take 100 mg by mouth at bedtime as needed for constipation., Disp: , Rfl:      FLUoxetine (PROZAC) 20 MG capsule, Take 40 mg by mouth daily. , Disp: , Rfl:      hydrOXYzine pamoate (VISTARIL) 25 MG capsule, Take 25 mg by mouth every 8 (eight) hours as needed for anxiety. , Disp: , Rfl:      lidocaine-prilocaine (EMLA) cream, Apply 30-45 minutes before the port access., Disp: 30 g, Rfl: 3     loperamide (IMODIUM A-D) 2 mg tablet, Start with 4 mg, followed by 2 mg after each loose stool (maximum: 16 mg/day), Disp: 30 tablet, Rfl: 0     LORazepam (ATIVAN) 0.5 MG tablet, Take 0.5 mg by mouth daily as needed. , Disp: , Rfl:      MULTIVITAMIN WITH MINERALS (HAIR,SKIN AND NAILS ORAL), Take 2 capsules by mouth daily., Disp: , Rfl:      naproxen sodium (ALEVE) 220 MG tablet, Take 220 mg by mouth as needed for pain., Disp: , Rfl:      QUEtiapine (SEROQUEL) 25 MG tablet, 25 mg at bedtime as needed. , Disp: , Rfl:      traZODone (DESYREL) 50 MG tablet, Take 50 mg by mouth at bedtime. , Disp: , Rfl:      ranitidine (ZANTAC) 150 MG tablet, Take 150 mg by mouth. prn, Disp: , Rfl:     Current Facility-Administered Medications:      heparin 100 unit/mL lockflush (PF) porcine  300-600 Units, 300-600 Units, Intravenous, PRN, Bharat Fagan MD, 600 Units at 11/20/17 0919    Allergies   Allergen Reactions     Penicillins Rash       Social History     Social History     Marital status: Single     Spouse name: Gisella     Number of children: 0     Years of education: N/A     Occupational History     NA      Partner works      Social History Main Topics     Smoking status: Former Smoker     Packs/day: 1.50     Years: 12.00     Types: Cigarettes     Quit date: 6/16/2002     Smokeless tobacco: Never Used     Alcohol use No      Comment: states has not drank in a couple months     Drug use: No     Sexual activity: No     Other Topics Concern     Not on file     Social History Narrative    Female partner.  Has been with partner for 5 years.  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         Family History   Problem Relation Age of Onset     No Medical Problems Mother      Lung cancer Father 78     Diabetes Sister      Skin cancer Sister      Hypertension Sister        Review of Systems:  Review of systems is otherwise negative, except as noted above.  Full 12 point review of systems was completed.    Imaging:  Nm Muga    Result Date: 12/5/2017    Normal study.   The left ventricular ejection fraction is 56.57%.   The measured left ventricular ejection fraction on the prior study date of 9/15/2017 was 59.25%.      Nm Pet Ct Skull To Mid Thigh    Result Date: 11/22/2017  [facility] PET FDG/CT 11/22/2017 2:29 PM INDICATION: Invasive ductal carcinoma upper outer quadrant left breast. Status post left partial mastectomy with left axillary lymph node dissection and chemotherapy. Follow-up and subsequent treatment strategy.. TECHNIQUE: Serum glucose level 78 mg/dL. One hour post intravenous administration of 10.7 mCi F-18 FDG, PET imaging was performed from the skull base to the mid thighs utilizing attenuation correction with concurrent axial CT and PET/CT image fusion.  Dose reduction techniques were used. COMPARISON: 06/13/2017. FINDINGS: There is physiologic activity in the neck. Right Port-A-Cath remains in good position. Posttreatment changes left breast and left axilla. The mildly FDG avid left axillary lymph node demonstrated on the prior scan is no longer demonstrated and there are no sites of suspicious FDG activity within the chest. There is physiologic activity in the abdomen and pelvis. Degenerative disc disease throughout the spine.     CONCLUSION: No evidence of disease.      Labs:  No results found for: SEDRATE      No results found for: CRP        Lab Results   Component Value Date    CREATININE 0.71 12/08/2017      No results found for: HGBA1C        Lab Results   Component Value Date    BUN 16 12/08/2017              Lab Results   Component Value Date    ALBUMIN 3.5 12/08/2017       No results found for: [id number]    No results found for: TSH  No components found for: [id number]      Physical Exam:  Vitals:    12/28/17 1421   BP: 117/80   Pulse: 80   Resp: 20   Temp: 97.5  F (36.4  C)      BMI 29.48    Circumferential measures:    Vasc Edema 9/25/2017 12/28/2017   Right-just above MCP 14.5 18   Right Wrist 14.7 15.3   Right Up 10cm 21.0 18.8   Right Up 10cm From Elbow 29.0 27.8   Left-just above MCP 17.8 18.3   Left Wrist 15.0 15.1   Left Up 10cm 21.0 19.3   Left Up 10cm From Elbow 30.5 29.3     Fairly stable    General:  62 y.o. female in no apparent distress.  Alert and oriented x 3.  Cooperative. Affect normal.    Range of Motion:  Range of motion of elbows, wrists is normal bilaterally without active joint synovitis, erythema, joint swelling, crepitus or joint laxity.   Right shoulder range of motion is full and left is to 178 forward flexion and abduction. There continues to be cording on the left anterior axilla.  This is all stable.      Strength:  Normal strength testing in shoulder abduction, elbow flexion, elbow extension, wrist extension, forearm  supination, forearm pronation and hand intrinsics bilaterally.       Lymph nodes: No cervical, supraclavicular, infraclavicular, or axillary lymphadenopathy palpated.     Vascular: No unusual venous distention.  Radial arterial pulses strong and equal at the wrists bilaterally.      Skin: No unusual rubor, calor, masses or rashes along the skin in either arm. Slight along left breast with tenderness consistent with radiation.   No significant fibrosity or scarring.  No pain to palpation.     Margaret Domingo MD, ABWMS, FACCWS, [facility]  Medical Director Wound Care and Lymphedema  [facility]  [phone]

## 2021-06-15 NOTE — PROGRESS NOTES
PT here for herceptin.  PT has ingrown nail on right foot and has difficulty with walking due to pain. Port accessed labs drawn and txt approved. herceptin infused over 30 minutes then tubing flushed with ns then port flushed with heparin/follow up reviewed and pt dc'd in wheelchair to taxi

## 2021-06-15 NOTE — PROGRESS NOTES
[facility] Hematology and Oncology Progress Note    Patient: Linda Canada  MRN: [id number]  Date of Service: 1/19/2018        Reason for Visit    Follow-up breast cancer on adjuvant chemotherapy.    Assessment and Plan  Malignant neoplasm of upper-outer quadrant of left female breast    Staging form: Breast, AJCC 7th Edition    - Clinical: No stage assigned - Unsigned    - Pathologic stage from 6/14/2017: Stage IIIA (T2, N2a, cM0) - Signed by Joie Ferro MD on 6/14/2017          ER Status: Positive          DE Status: Positive          HER2 Status: Positive    ECOG Performance   ECOG Performance Status: 1     Distress Assessment  Distress Assessment Score: Extreme distress    Pain  Currently in Pain: Yes  Pain Score (Initial OR Reassessment): 10  Location: right great toe      # Stage IIIA (pT2, pN2a, M0) invasive ductal carcinoma of the left breast- grade 3, ER (high positive), DE (positive) and HER2 (positive) with associated high-grade DCIS.  Final margins for invasive carcinoma and DCIS are positive.  S/p left breast lumpectomy, left axillary sentinel lymph node biopsy and a left axillary lymph node dissection on 5/31/17.   - She is physcially feeling well except she has a toenail infection that she is not able to walk and having pain from that.  No infections.  Her labs were reviewed and they were adequate.  She is agreeable to continue with Herceptin today.  We review her treatment plan and she will complete Herceptin in end of May 2018.     -She is currently undergoing adjuvant radiation and tolerating well with expected side effects.   -RTC in every 3 weeks for Herceptin and every 6 weeks provider visit.  We will discuss about adjuvant endocrine therapy in next visit after completion of radiation treatment.    #.  Left upper extremity restricted range of motion.   -She is doing physical exercises and follow with Dr. Domingo.     #. Depression/anxiety- chronic with recent exacerbation due to recent  diagnosis of breast cancer.   -She is still struggling with her anxiety and depression.  She feels  she reports very helpful for her.  She will continue to working with her.  She continue on her antidepressant medication as well.      Problem List    1. Malignant neoplasm of upper-outer quadrant of left breast in female, estrogen receptor positive  CC OFFICE VISIT LONG    Infusion Appointment    Infusion Appointment    CC OFFICE VISIT LONG    NM Muga      ______________________________________________________________________________    Diagnosis  5/31/17  Stage IIIA (pT2, pN2a, M0) invasive ductal carcinoma of the left breast   - ER (high positive, 80-90%), SC (high positive, 90%) HER2 (+ by FISH by avg HER2 signals 5.3, HER2/CEP17 ratio 2.3)  - Horacio grade 3  - Tumor size: 26 mm  - Margins positive and posterior margin for invasive component and positive for posterior margin for DCIS.  - Angiolymphatic invasion present.  - 5/12 left axillary lymph node positive for carcinoma  - associated DCIS, grade 2, solid and cribriform with focal comedonecrosis.  20%,      PET scan did not show distant metastases.      Therapy to date:  5/31/17 - left partial mastectomy and Left axillary lymph node dissection  6/23/17- 10/6/17-C#1-6 TCHP; added neulasta support to TCHP starting C#2.  Herceptin to complete one year.  1/22/2018-completed adjuvant radiation to the left breast 6040 cGy/33 fractions    Cardiac MUGA  6/16/17- LVEF 59.4%  9/15/17- LVEF 59%    History of Present Illness  She presented herself today.      She is here for cycle 11 for Herceptin. She continues struggling with depression, anxiety and seeing Josselyn Bowles for that.  She has mild burning with urination but not significant.  No abdominal pain, nausea, vomiting.  No fever.     Pain Status  Currently in Pain: Yes    Review of Systems    Constitutional  Constitutional (WDL): Exceptions to WDL  Fatigue: Fatigue not relieved by rest - Limiting  "instrumental ADL  Fever: None  Chills: None (occ)  Weight Loss: None  Neurosensory  Neurosensory (WDL): Exceptions to WDL  Peripheral Motor Neuropathy: Moderate symptoms, limiting instrumental ADL  Ataxia: Asymptomatic, clinical or diagnostic observations only, intervention not indicated  Peripheral Sensory Neuropathy: Moderate symptoms, limiting instrumental ADL  Confusion: None  Syncope: None  Eye   Eye Disorder (WDL): Exceptions to WDL  Blurred Vision: Intervention not indicated  Dry Eye: None  Eye Pain: None  Watering Eyes: Intervention not indicated  Ear  Ear Disorder (WDL): All ear disorder elements are within defined limits  Cardiovascular  Cardiovascular (WDL): Exceptions to WDL (R great toe due to ingroin toe nail)  Pulmonary  Respiratory (WDL): Exceptions to WDL  Cough: Mild symptoms, nonprescription intervention indicated (\"once in awhile\"-dry cough)  Dyspnea: Shortness of breath with moderate exertion  Hypoxia: None  Gastrointestinal  Gastrointestinal (WDL): Exceptions to WDL  Anorexia: Loss of appetite without alteration in eating habits  Constipation: Occasional or intermittent symptoms, occasional use of stool softeners, laxatives, dietary modification, or enema  Diarrhea: None  Dysphagia: None  Esophagitis: None  Nausea: Loss of appetite without alteration in eating habits (intermittent)  Pharyngitis: None  Vomiting: None  Dysgeusia: Altered taste but no change in diet  Dry Mouth: Symptomatic (e.g., dry or thick saliva) without significant dietary alteration, unstimulated saliva flow >0.2 ml/min  Genitourinary  Genitourinary (WDL): Exceptions to WDL  Urinary Tract Pain: Mild pain (\"not bad by I can feel it\")  Lymphatic  Lymph (WDL): Exceptions to WDL  Musculoskeletal and Connective Tissue  Musculoskeletal and Connetive Tissue Disorders (WDL): All Musculoskeletal and Connetive Tissue Disorder elements are within defined limits  Integumentary  Integumentary (WDL): Exceptions to WDL  Alopecia: Hair loss " of >50% normal for that individual that is readily apparent to others, a wig or hair piece is necessary if the patient desires to completely camouflage the hair loss, associated with psychosocial impact  Pruritus: Mild or localized, topical intervention indicated  Patient Coping  Patient Coping: Open/discussion  Distress Assessment  Distress Assessment Score: Extreme distress  Accompanied by  Accompanied by: Alone  Oral Chemo Adherence       Past History  Past Medical History:   Diagnosis Date     Anxiety      Breast cancer 05/31/2017    left breast     Neuropathy      Panic attack        Physical Exam    Recent Vitals 1/19/2018   Height -   Weight 147 lbs 8 oz   BSA (m2) -   /67   Pulse 66   Temp 98.1   Temp src 1   SpO2 100   Some recent data might be hidden     General: alert, awake, not in acute distress  HEENT: Head: Normal, normocephalic, atraumatic.  Eye: Normal external eye, conjunctiva, lids cornea, BALA.  Ears: Non-tender.  Nose: Normal external nose, mucus membranes and septum.  Pharynx: Dental Hygiene adequate. Normal buccal mucosa.  The meatus pharynx but no open ulceration.    Neck / Thyroid: Supple, no masses, nodes, nodules or enlargement.  Lymphatics: No abnormally enlarged lymph nodes.  Chest: Normal chest wall and respirations. Clear to auscultation.  Breast: Left breast showed well-healed scar with some skin erythema as expected.  Limited range of motion in the left shoulder.  No palpable adenopathy no masses.  Heart: S1 S2 RRR, no murmur.   Abdomen: abdomen is soft without significant tenderness, masses, organomegaly or guarding  Extremities: normal strength, tone, and muscle mass  Skin: normal. Crusted papular rash on scalp.  CNS: non focal.      Lab Results    Recent Results (from the past 168 hour(s))   Magnesium (add-ons/treatment plan)   Result Value Ref Range    Magnesium 2.0 1.8 - 2.6 mg/dL       Imaging    No results found.      Signed by: RASTA Bonds

## 2021-06-15 NOTE — PROGRESS NOTES
RADIATION ONCOLOGY WEEKLY TREATMENT VISIT NOTE      Assessment / Impression       1. Malignant neoplasm of upper-outer quadrant of left breast in female, estrogen receptor positive       Malignant neoplasm of upper-outer quadrant of left female breast    Staging form: Breast, AJCC 7th Edition    - Clinical: No stage assigned - Unsigned    - Pathologic stage from 2017: Stage IIIA (T2, N2a, cM0) - Signed by Joie Ferro MD on 2017          ER Status: Positive          AR Status: Positive          HER2 Status: Positive   Tolerating radiation therapy well.  All questions and concerns addressed.  Completed this RT course 6040cGy in 33 fx  No c/o  Skin n color intact  Heart lungs n sounds  No edema    Plan:     Continue radiation treatment as prescribed.  Radiation: Site: Left Breast  Stereotactic Radiosurgery: No  Stereotactic Radiosurgery: No  Concurrent Therapy: No  Today's Dose: 6040  Total Dose for Breast: 6040  Today's Fraction/Total Fraction Breast:     Subjective:      HPI: Linda Canada is a 63 y.o. female with    1. Malignant neoplasm of upper-outer quadrant of left breast in female, estrogen receptor positive         The following portions of the patient's history were reviewed and updated as appropriate: allergies, current medications, past family history, past medical history, past social history, past surgical history and problem list.    Assessment                  Body Site: lt breast                                          Sexuality Alteration                 Emotional Alteration Copin: Ineffective  Comfort Alteration KPS: 70% Cannot do active work, but can care for self  Fatigue (ONS scale) : 7: Extreme Fatigue  Pain Location: Right Foot  Pain Intensity. Rate degree of pain ranging from 0 (no pain) to 10 (severe pain) : 8  Pain Description: Throbbing - Pulsating pain, heart beat type of rhythm  Pain Intervention: 2: Nonsteroidal anti-inflammatory agents or  non-opiods  Effectiveness of pain intervention: 2: Pain relieved 50%  Hot Flashes and/or Flushes: 1: Mild or no more than 1 per day   Nutrition Alteration Anorexia: 1: Loss of appetite  Nausea: 1: Able to eat (had reaction to Norco)  Vomitin: 1 episode in 24 hours over pretreatment (after taking Norco)  Skin Alteration Skin Sensation: 1:Pruitis (improving)  Skin Reaction: 2: Bright erythema  AUA Assessment                                  Accompanied by       Objective:     Exam:     Vitals:    18 1021   BP: 129/67   Pulse: 70   Temp: 98.2  F (36.8  C)   SpO2: 97%       Wt Readings from Last 8 Encounters:   18 145 lb (65.8 kg)   18 147 lb 8 oz (66.9 kg)   18 146 lb 14.4 oz (66.6 kg)   18 144 lb 12.8 oz (65.7 kg)   18 145 lb (65.8 kg)   17 156 lb (70.8 kg)   17 148 lb 12.8 oz (67.5 kg)   17 146 lb 4.8 oz (66.4 kg)       General: Alert and oriented, in no acute distress  Linda has mild, moderate Erythema.    Treatment Summary to Date    Aria chart and setup information reviewed    Adrian Horton MD

## 2021-06-15 NOTE — PROGRESS NOTES
Radiation Treatment Summary    Patient: Linda Canada   MRN: [id number]  : 1955  Care Provider: Betty Pinzon  Date of Service: 2018      HISTORY: Linda Canada was treated with 3D conformal radiation therapy for left breast cancer that was picked up by the patient in April.  She then had a mammogram and ultrasound done.  Pathology from needle biopsy on 5/15/2017 showed ER/NY+, HER-2 positive invasive ductal carcinoma.          Dr. Kwong performed left lumpectomy and axillary lymph node dissection 2017.  The tumor was 2.6cm and the deep margin was positive.  5/12 lymph nodes were positive.  Re-excision was debated due to positive margins.  Post-operative course uneventful.  Dr. Kwong ordered an MRI 10/19/2017 which did not show any abnormalities.          6 cycles of TCHP competed 10/6/2017 and maintenance Herceptin started 10/27/2017.  Seen by Dr. Pollock for lymphedema and ROM exercises.          SITE TREATED: Left Breast   TOTAL DOSE: 6040 cGy  NUMBER OF FRACTIONS: 33  DATES COMPLETED: 2018  CONCURRENT CHEMOTHERAPY: No  ADJUVANT THERAPY:Yes: Herceptin    Linda tolerated the treatment without unexpected side effects.  She did struggle throughout the course of treatment with ongoing anxiety and depression which and has been followed by her psychologist.       PLAN: Discharge instructions were given and Linda knows to call if questions/issues arise. Linda will be seen in f/u in 4-6 weeks without a scan.       Signed by: Agnes Holliday, [facility]

## 2021-06-16 PROBLEM — M25.512 ACUTE PAIN OF LEFT SHOULDER: Status: ACTIVE | Noted: 2018-03-08

## 2021-06-16 PROBLEM — E55.9 VITAMIN D DEFICIENCY: Status: ACTIVE | Noted: 2019-07-29

## 2021-06-16 PROBLEM — S46.002A INJURY OF LEFT ROTATOR CUFF: Status: ACTIVE | Noted: 2018-03-08

## 2021-06-16 PROBLEM — F41.9 ANXIETY: Status: ACTIVE | Noted: 2017-07-14

## 2021-06-16 PROBLEM — M81.8 OTHER OSTEOPOROSIS WITHOUT CURRENT PATHOLOGICAL FRACTURE: Status: ACTIVE | Noted: 2018-09-28

## 2021-06-16 PROBLEM — G62.0 DRUG-INDUCED POLYNEUROPATHY (H): Status: ACTIVE | Noted: 2017-12-28

## 2021-06-16 PROBLEM — Z79.811 AROMATASE INHIBITOR USE: Status: ACTIVE | Noted: 2018-09-28

## 2021-06-16 PROBLEM — L90.5 SCAR CONDITION AND FIBROSIS OF SKIN: Status: ACTIVE | Noted: 2017-09-25

## 2021-06-16 PROBLEM — I97.2 POST-MASTECTOMY LYMPHEDEMA SYNDROME: Status: ACTIVE | Noted: 2017-09-25

## 2021-06-16 PROBLEM — M24.512 CONTRACTURE, LEFT SHOULDER: Status: ACTIVE | Noted: 2017-09-25

## 2021-06-16 PROBLEM — C50.412 MALIGNANT NEOPLASM OF UPPER-OUTER QUADRANT OF LEFT FEMALE BREAST (H): Status: ACTIVE | Noted: 2017-06-14

## 2021-06-16 PROBLEM — F33.9 RECURRENT MAJOR DEPRESSIVE DISORDER (H): Status: ACTIVE | Noted: 2017-07-14
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
## 2021-06-16 NOTE — TELEPHONE ENCOUNTER
Telephone Encounter by Mariah Anglin RN at 7/29/2019 11:46 AM     Author: Mariah Anglin RN Service: -- Author Type: Registered Nurse    Filed: 7/29/2019 11:57 AM Encounter Date: 7/29/2019 Status: Addendum    : Mariah Anglin RN (Registered Nurse)    Related Notes: Original Note by Mariah Anglin RN (Registered Nurse) filed at 7/29/2019 11:48 AM       Joie Ferro MD Sullivan, Jill M, RN             Please ask her to start   1) Cordelia D 50,000 units once a week for 6 week.   2) stop Cordelia D 1000 unit but to resume after she finishes 50,000 unit Rx.   3) will recheck the cordelia D level in 3 months in next visit.     Thanks.      1147:  Left message to return my call so I can review the above message from Dr. Ferro/BETH Anglin RN     1156:  Patient returned my call. Instructions above given. Patient wrote them down and repeated back to me.  She will  her Vit D. Rx and start tomorrow, 7/30/19. Dr. Ferro updated/BETH Anglin RN

## 2021-06-16 NOTE — PROGRESS NOTES
Day 1 Cycle 13 --  Pt arrived amb[facility] from [facility] at 09:05, seated in chair 4. Reviewed plan of care, today's treatment, the chest x-ray scheduled for today, and the follow-up appointments. Excellent blood return from the port a cath, and NS was used as the primary IV solution. Trastuzumab 400 mg infused over 30 minutes, with no ill effects noted. The IV line was flushed with NS 50 mls - and the port a cath was flushed, heparinized, and then deaccessed. Assisted pt to the bathroom. The volunteer took pt via w/c to radiology for CXR at 11:09. Appointment schedule was given to the patient. Following the x-ray, pt returned to the reception area on 2nd floor [facility], and Carol assisted with calling [facility] for [facility]. Per pt request, she went to [facility] to wait.    Veronica Alexander RN

## 2021-06-16 NOTE — PROGRESS NOTES
[facility] Daily Progress     Patient Name: Linda Canada  Date: 3/26/2018  Visit #: 2  PTA visit #:  1  Referral Diagnosis: [unfilled]  Referring provider: Toña Barrera PA-C  Visit Diagnosis:     ICD-10-CM    1. Injury of left rotator cuff, initial encounter S46.002A    2. Scar condition and fibrosis of skin L90.5    3. Contracture, left shoulder M24.512    4. Post-mastectomy lymphedema syndrome I97.2    5. Malignant neoplasm of upper-outer quadrant of left breast in female, estrogen receptor positive C50.412     Z17.0          Assessment:   Pt was seen today for her first follow up visit.  Pt presents with L UE pain and decreased ROM. Pt tends to guard her LE.  Patient is benefitting from skilled physical therapy and is making steady progress toward functional goals.  Patient is appropriate to continue with skilled physical therapy intervention, as indicated by initial plan of care.    Goal Status:  Pt. will demonstrate/verbalize independence in self-management of condition in : 4 weeks  Pt. will have improved quality of sleep: with less pain;waking less times/night;in 4 weeks  Patient will reach / maintain arm movement: forward;overhead;behind;for home chores;for dressing;with partial ROM;with less pain;in 4 weeks;Comment  Comment: decrease fatigue  No Data Recorded    Plan / Patient Education:     Continue with initial plan of care.  Progress with home program as tolerated.    Subjective:   Pt reports her L UE was very sore last night.   She has been compliant with her HEP.  Pain Ratin        Objective:   L shoulder ROM:  Flexion: 80 degrees   Abduction: 60 degrees  ER: 40 degrees  IR: thumb to sacrum      Treatment Today     TREATMENT MINUTES COMMENTS   Evaluation     Self-care/ Home management     Manual therapy 30 MFR/STM to L upper traps, L pec major, L scalene and SCM L cervical paraspinals  PROM L shoulder   Neuromuscular Re-education     Therapeutic Activity     Therapeutic Exercises  25 UBE x3'  -added to HEP:  Bent over rows  -SL ER  -wand ER and AB (seated)   Gait training     Modality__________________                Total 55    Blank areas are intentional and mean the treatment did not include these items.       Divina Wick,CLT  3/26/2018
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
## 2021-06-16 NOTE — PROGRESS NOTES
[facility]   Lymphedema Initial Evaluation      Patient Name: Linda Canada  Date of evaluation: 3/26/2018  Referral Diagnosis: left rotator cuff injury with pain and decreased rom  Referring provider: Margaret Domingo  Visit Diagnosis:     ICD-10-CM    1. Injury of left rotator cuff, initial encounter S46.002A    2. Scar condition and fibrosis of skin L90.5    3. Contracture, left shoulder M24.512        Assessment:   Linda Canada is a 63 y.o. female who presents to therapy today with chief complaints of left shoulder pain. Onset date of sx was after finished her radiation treatments.  Pt reported h/o feeling tired and not having energy to do her normal daily activities and needs help for dressing.  Pain symptoms are waking her up at night. Previous treatments have included physical therapy Functional impairments include reaching, dressing, lifting.  Pt demo's signs and sx consistent with left shoulder rotator cuff, limited left shoulder functional mobility, fatigue.      Pt. is appropriate for skilled PT intervention as outlined in the Plan of Care (POC).  Pt. is a good candidate for skilled PT services to improve pain levels and function.    Diagnoses and all orders for this visit:    Injury of left rotator cuff, initial encounter    Scar condition and fibrosis of skin    Contracture, left shoulder         Goals:   Pt. will demonstrate/verbalize independence in self-management of condition in : 4 weeks  Pt. will have improved quality of sleep: with less pain;waking less times/night;in 4 weeks  Patient will reach / maintain arm movement: forward;overhead;behind;for home chores;for dressing;with partial ROM;with less pain;in 4 weeks;Comment  Comment: decrease fatigue      Goals and plan of care were set in collaboration with the patient.    Patient's expectations/goals are realistic.    Barriers to Learning or Achieving Goals:  Co-morbidities or other medical factors.  history of cncer  treatments.           Plan / Patient Instructions:      Plan of Care:   Authorization / Certification Start Date: 03/20/18  Authorization / Certification End Date: 05/19/18  Communication with: Referral Source;Patient Caregiver  Patient Related Instruction: Nature of Condition;Treatment plan and rationale;Self Care instruction;Basis of treatment;Body mechanics;Posture;Precautions;Next steps;Expected outcome  Times per Week: 2  Number of Weeks: 4  Number of Visits: 8  Discharge Planning: patient will be discharge to self care  Therapeutic Exercise: ROM;Stretching;Strengthening  Neuromuscular Reeducation: kinesio tape;posture  Manual Therapy: soft tissue mobilization;myofascial release  Treatment techniques, plan of care, and goals were discussed with the patient. The patient agrees to the plan as outlined. The plan of care is dynamic and will be modified on an ongoing basis.  Plan for next visit:   Contraindications: No diathermy/US, painfree    Frequency: 2-3 per week Duration: 3-5 weeks    Therapies:   PT    Procedures/ ROM:  ROM Passive, ROM Active Assisted, ROM Active and Massage MFR    Exercise:  Stretching / Flexibility, Shoulder Stabilization / Strength and PRE's conditioning    Education:  Self manage / Home program, Caregiver education, Posture and Body Mechanics    Goals:  Decrease pain, Decrease swelling, Increase motion, Increase independence / self manage and Increase strength     Subjective:         Social information:   Living Situation:single family home with partner.   Occupation:unemployed   Work Status:NA   Equipment Available: None and SE cane    History of Present Illness:    Linda is a 63 y.o. female who presents to therapy today with complaints of left shoulder pain. Date of onset/duration of symptoms is after she finished radiation. Onset was gradual. Symptoms are constant and not improving. She denies history of similar symptoms. She describes their previous level of function as not  limited    Pain Ratin  Pain rating at best: 6  Pain rating at worst: 9  Pain description: throbing.wakes at night.   Cramps on the left hand once on a while.    Functional limitations are described as occurring with:   reaching at shoulder height, overhead and behind back    Patient reports benefit from:  rest         Objective:      Patient Outcome Measures :    Shoulder Pain and Disability Index (SPADI) in %: 91   Scores range from 0-100%, where a score of 0% represents minimal pain and maximal function. The minimal clincically important difference is a score reduction of 10%.    FACIT:9  supine  Lymphedema Assessment 3/20/2018   Right Upper Extremity Total Estimated Volume (cm3) .84   Left Upper Extremity Total Estimated Volume (cm3) 2768.81   Swelling Description Left>Right   Upper Extremity Swelling Comparison (%) 25.71         Upper Extremity Lymphedema  3/20/2018   cm to wrist (cm) 9   cm to Tip of 3rd Finger 10   R Thumb (cm) 6.8   R Index (cm) 6.5   R Middle (cm) 6.4   R Ring (cm) 5.7   R Little (cm) 5.8   R --cm from 3rd fingertip 10   R --cm to smallest wrist 16.5   R 8cm Proximal to Wrist (cm) 15   R 16cm Proximal to Wrist (cm) 16.7   R 24cm Proximal to Wrist (cm) 23.5   R 32cm Proximal to Wrist (cm) 25   R 40cm Proximal to Wrist (cm) 27   R 48cm Proximal to Wrist (cm) 31.5   Right Upper Extremity Total Estimated Volume (cm3) .84   L Thumb (cm) 6.5   L Index (cm) 6.1   L Middle (cm) 6.1   L Ring (cm) 5.5   L Little (cm) 5.1   L --cm from 3rd fingertip (cm) 17.4   L --cm to smallest wrist (cm) 15.3   L 8cm Proximal to Wrist (cm) 17   L 16cm Proximal to Wrist (cm) 22   L 24cm Proximal to Wrist (cm) 25.6   L 32cm Proximal to Wrist (cm) 29.4   L 40cm Proximal to Wrist (cm) 32   L 48cm Proximal to Wrist (cm) 35.4   Left Upper Extremity Total Estimated Volume (cm3) 2768.81   Swelling Description Left>Right   Upper Extremity Swelling Comparison (%) 25.71           Degree of swelling: Minimal <  10%    Areas of most significant swelling: left deltoid    Shoulder/Elbow ROM  Date: 3-20-18     Shoulder and Elbow ROM ( )   AROM in degrees AROM in degrees AROM in degrees    Right Left Right Left Right Left   Shoulder Flexion (0-180 )  25o       Shoulder Abduction (0-180 )  10o       Shoulder Extension (0-60 )  14o       Shoulder ER (0-90 )         Shoulder IR (0-70 )         Shoulder IR/EXT         Elbow Flexion (150 )         Elbow Extension (0 )          PROM in degrees PROM in degrees PROM in degrees    Right Left Right Left Right Left   Shoulder Flexion (0-180 )         Shoulder Abduction (0-180 )         Shoulder Extension (0-60 )         Shoulder ER (0-90 )         Shoulder IR (0-70 )         Elbow Flexion (150 )         Elbow Extension (0 )           Shoulder/Elbow Strength  Date: 3-20-18     Shoulder/Elbow Strength (/5)  Manual Muscle Test (MMT) MMT MMT MMT    Right Left Right Left Right Left   Shoulder Flexion  3+       Supraspinatus         Shoulder Abduction  3       Shoulder Extension  3       Shoulder External Rotation         Shoulder Internal Rotation         Elbow Flexion         Elbow Extension         Other:         Other:               Examination  1. Injury of left rotator cuff, initial encounter     2. Scar condition and fibrosis of skin     3. Contracture, left shoulder       Precautions/Restrictions: history of left breast cancer.  Involved side: Left  Posture Observation:      General standing posture is fair.  Shoulder/Thoracic complex: Moderate left scapular winging    mastectomy lymphedema with cording in the left axilla after being diagnosed with left breast carcinoma in May of 2017, chemo and radiation.  Edema: Pitting at grade, 1+, Minimal and Stemmers sign  negative  Induration: No  Fibrosis: absent  Temperature: Normal  Sensation: Hyposensitive  Skin color: Normal  Skin texture: Dry  Scars: Location: left chest wall.  Size: 10 cm.  Wounds: Absent  Muscle tone: Atrophy  Capillary  refill: symmetrical .    Gait: slow independent using SEC, gets t.ired after 30feet.        Treatment Today     TREATMENT MINUTES COMMENTS   Evaluation 30 low   Self-care/ Home management     Manual therapy 15 Position supine:  Myofascial release (layers 1-3): Left: teres major, pec minor, upper trap. Bilateral: Cervical paraspinals, scalenes, SCM.  Left scapular: transverse stroking follow with deep stroking pressure medially     Neuromuscular Re-education     Therapeutic Activity     Therapeutic Exercises 15 Exercises:  Exercise #1: pendulum, wand  Passive left shoulder ROM   Gait training     Modality__________________                Total 60    Blank areas are intentional and mean the treatment did not include these items.     PT Evaluation Code: (Please list factors)  Patient History/Comorbidities: breast ca.  Examination: left shoulder pain.  Clinical Presentation: stable.  Clinical Decision Making: low.    Patient History/  Comorbidities Examination  (body structures and functions, activity limitations, and/or participation restrictions) Clinical Presentation Clinical Decision Making (Complexity)   No documented Comorbidities or personal factors 1-2 Elements Stable and/or uncomplicated Low   1-2 documented comorbidities or personal factor 3 Elements Evolving clinical presentation with changing characteristics Moderate   3-4 documented comorbidities or personal factors 4 or more Unstable and unpredictable High            Chitra Palomino PT, JOSET-ROBERT  3/20/2018  8:38 AM

## 2021-06-16 NOTE — PROGRESS NOTES
[facility] Radiation Oncology Follow Up Note    Patient: Linda Canada  MRN: [id number]  Date of Service: 02/27/2018    Assessment / Impression     1. Malignant neoplasm of upper-outer quadrant of left breast in female, estrogen receptor positive        Malignant neoplasm of upper-outer quadrant of left female breast    Staging form: Breast, AJCC 7th Edition    - Clinical: No stage assigned - Unsigned    - Pathologic stage from 6/14/2017: Stage IIIA (T2, N2a, cM0) - Signed by Joie Ferro MD on 6/14/2017          ER Status: Positive          LA Status: Positive          HER2 Status: Positive  ECOG Peformance Status  ECOG Performance Status: 1  Distress Assessment Score  Distress Assessment Score: 8 (bad mood)  Interventions  Distress Assessment Intervention: Provider Notified  Body site: Breast     Plan:   1. Keep appointment to see Dr. Domingo regarding LUE lymphedema.   2. Patient given referral to PT, transferring to [facility], for UE lymphedema and ROM exercises.  3. Encouraged patient to use LUE as much as possible and work on ROM in the meantime.  4. Keep appointment with Josselyn Bowles on 3/1/2018.   5. She has a history of cutting and she feels the ER is a trigger for her self injurious behavior. We reviewed her safety plan as below. We gave her a list of people to call if she starts feeling worse and she will put it in multiple areas of her home including next to her bed. If she is panicked she will not have to recall numbers.   6. F/U 6 months.    Patient does not wish to go to the ER because she gets significant anxiety about more medication. She does have suicidal ideation but no definitive plan. She will go home and take her valium anti anxiety medication. The patient will write down the name of her therapist, her telephone number, crisis line, and 911 which will be available next to her bed that she may call at anytime. In clinic we practiced breathing exercises which may aid her if her anxiety  "worsens.     Face to face time 40 minutes with > 75% spent on consultation, education and coordination of care , development of safety plan.     Subjective:      HPI: Linda Canada is a 63 y.o. female was treated with 3D conformal radiation therapy for left breast cancer that was picked up by the patient in April.  She then had a mammogram and ultrasound done.  Pathology from needle biopsy on 5/15/2017 showed ER/NY+, HER-2 positive invasive ductal carcinoma.           Dr. Kwong performed left lumpectomy and axillary lymph node dissection 5/31/2017.  The tumor was 2.6cm and the deep margin was positive.  5/12 lymph nodes were positive.  Re-excision was debated due to positive margins.  Post-operative course uneventful.  Dr. Kwong ordered an MRI 10/19/2017 which did not show any abnormalities.           6 cycles of TCHP competed 10/6/2017 and maintenance Herceptin started 10/27/2017.  Seen by Dr. Pollock for lymphedema and ROM exercises.         SITE TREATED: Left Breast              TOTAL DOSE: 6040 cGy  NUMBER OF FRACTIONS: 33  DATES COMPLETED: 1/22/2018  CONCURRENT CHEMOTHERAPY: No  ADJUVANT THERAPY:Yes: Herceptin     Linda tolerated the treatment without unexpected side effects.  She did struggle throughout the course of treatment with ongoing anxiety and depression which and has been followed by her psychologist.       Today the patient describes some swelling to her left arm. She has been wearing her full arm compression sleeve daily but forgot to put it on this morning. She has not seen PT since 11/13/2017 and has an appointment to see Dr. Domingo on 3/8/2018.     Regarding mental health, the patient has been self harming by hitting herself. At times she feels like she wants to cut herself, including presently, and states \"sometimes I do not want exist.\" She has plans to see Josselyn Bowles 3/1/2018.    Current Outpatient Prescriptions   Medication Sig Dispense Refill     acetaminophen (TYLENOL) 500 MG tablet Take " 1,000 mg by mouth every 4 (four) hours as needed for pain.       docusate sodium (COLACE) 100 MG capsule Take 100 mg by mouth at bedtime as needed for constipation.       FLUoxetine (PROZAC) 20 MG capsule Take 40 mg by mouth daily.        ibuprofen (ADVIL,MOTRIN) 200 MG tablet Take 400 mg by mouth every 6 (six) hours as needed for pain.       lidocaine-prilocaine (EMLA) cream Apply 30-45 minutes before the port access. 30 g 3     loperamide (IMODIUM A-D) 2 mg tablet Start with 4 mg, followed by 2 mg after each loose stool (maximum: 16 mg/day) 30 tablet 0     LORazepam (ATIVAN) 0.5 MG tablet Take 0.5 mg by mouth daily as needed.        MULTIVITAMIN WITH MINERALS (HAIR,SKIN AND NAILS ORAL) Take 2 capsules by mouth daily.       naproxen (NAPROSYN) 375 MG tablet        naproxen sodium (ALEVE) 220 MG tablet Take 220 mg by mouth as needed for pain.       NEOMYCIN-BACITRACIN-POLYMYXIN ointment        omeprazole (PRILOSEC) 20 MG capsule TAKE 1 CAPSULE(20 MG) BY MOUTH DAILY BEFORE BREAKFAST 30 capsule 0     QUEtiapine (SEROQUEL) 50 MG tablet        ranitidine (ZANTAC) 150 MG tablet Take 150 mg by mouth. prn       sulfamethoxazole-trimethoprim (SEPTRA DS) 800-160 mg per tablet Take 1 tablet by mouth 2 (two) times a day.        traZODone (DESYREL) 100 MG tablet        mometasone (ELOCON) 0.1 % lotion Apply PRN for itch 60 mL 2     Current Facility-Administered Medications   Medication Dose Route Frequency Provider Last Rate Last Dose     heparin 100 unit/mL lockflush (PF) porcine 300-600 Units  300-600 Units Intravenous PRN Bharat Fagan MD   600 Units at 11/20/17 0919       The following portions of the patient's history were reviewed and updated as appropriate: allergies, current medications, past family history, past medical history, past social history, past surgical history and problem list.    Review of Systems    General  Constitutional (WDL): Exceptions to WDL  Fatigue: Fatigue relieved by rest  Chills: None  (occasionally)  Hot flashes/Night Sweats: Mild symptoms, no intervention needed (with increased anxiety)  EENT  Eye Disorder (WDL): Exceptions to WDL  Blurred Vision: Intervention not indicated  Watering Eyes: Intervention not indicated  Ear Disorder (WDL): All ear disorder elements are within defined limits  Respiratory       Respiratory (WDL): Exceptions to WDL  Dyspnea: Shortness of breath with moderate exertion  Cardiovascular  Cardiovascular (WDL): All cardiovascular elements are within defined limits  Endocrine     Gastrointestinal  Gastrointestinal (WDL): Exceptions to WDL  Anorexia: Loss of appetite without alteration in eating habits  Nausea: Loss of appetite without alteration in eating habits  Dysgeusia: Altered taste but no change in diet  Constipation: Occasional or intermittent symptoms, occasional use of stool softeners, laxatives, dietary modification, or enema  Musculoskeletal  Musculoskeletal and Connetive Tissue Disorders (WDL): Exceptions to WDL  Myalgia: Mild pain (shoulder & left breast tenderness)  Integumentary               Integumentary (WDL): Exceptions to WDL  Pruritus: Mild or localized, topical intervention indicated (all over)  Neurological  Neurosensory (WDL): Exceptions to WDL  Peripheral Motor Neuropathy: Moderate symptoms, limiting instrumental ADL (hands & feet)  Ataxia: Asymptomatic, clinical or diagnostic observations only, intervention not indicated (uses cane)  Peripheral Sensory Neuropathy: Moderate symptoms, limiting instrumental ADL (hands & feet)  Dizziness: Mild unsteadiness or sensation of movement (occasionally with positional changes)  Psychological/Emotional   Patient Coping: Open/discussion  Hematological/Lymphatic  Lymph (WDL): All lymph disorder elements are within defined limits  Dermatologic     Genitourinary/Reproductive  Genitourinary (WDL): All genitourinary elements are within defined limits  Reproductive     Pain              Currently in Pain: Yes  Pain  "Score (Initial OR Reassessment): 8  Pain Frequency: Intermittent  Location: left shoulder & breast  Pain Intervention(s): Repositioned;Home medication  Response to Interventions: better  AUA Assessment                                  Accompanied by  Accompanied by: Alone      Objective:     Physical Exam    Vitals:    02/27/18 0924   BP: 122/80   Pulse: 67   Temp: 98.1  F (36.7  C)   TempSrc: Oral   SpO2: 99%   Weight: 145 lb 8 oz (66 kg)   Height: 5' 4\" (1.626 m)        Physical Exam  Head: Normocephalic, without obvious abnormality, atraumatic  Neck: no adenopathy and supple, symmetrical, trachea midline  Lungs: clear to auscultation bilaterally  Breasts: Desquamation and hyperpigmentation present.   Heart: regular rate and rhythm  Skin: Skin color, texture, turgor normal. No rashes or lesions  Lymph nodes: Cervical, supraclavicular, and axillary nodes normal.  Extremities:  Lymphedema present of the left arm. Limited ROM of LUE.   Neurologic: Grossly normal  Pysch: Anxious, suicidal ideation.       Recent Labs: No results found for this or any previous visit (from the past 168 hour(s)).    Imaging: Imaging results 30 days: No results found.    I, Betty Pinzon MD personally performed the services described in this documentation, as scribed by Duong Noland in my presence, and it is both accurate and complete.    Signed by: Betty Pinzon MD, MPH   "

## 2021-06-16 NOTE — PROGRESS NOTES
[facility] Hematology and Oncology Progress Note    Patient: Linda Canada  MRN: [id number]  Date of Service: 3/2/2018        Reason for Visit    Follow-up breast cancer on adjuvant chemotherapy.    Assessment and Plan  Malignant neoplasm of upper-outer quadrant of left female breast    Staging form: Breast, AJCC 7th Edition    - Clinical: No stage assigned - Unsigned    - Pathologic stage from 6/14/2017: Stage IIIA (T2, N2a, cM0) - Signed by Joie Ferro MD on 6/14/2017          ER Status: Positive          ND Status: Positive          HER2 Status: Positive    ECOG Performance   ECOG Performance Status: 3     Distress Assessment  Distress Assessment Score: 8 (Pain, swelling, fatigue)    Pain  Currently in Pain: Yes  Pain Score (Initial OR Reassessment): 8  Location: Left Shoulder       # Stage IIIA (pT2, pN2a, M0) invasive ductal carcinoma of the left breast- grade 3, ER (high positive), ND (positive) and HER2 (positive) with associated high-grade DCIS.  Final margins for invasive carcinoma and DCIS are positive.  S/p left breast lumpectomy, left axillary sentinel lymph node biopsy and a left axillary lymph node dissection on 5/31/17.   - She is physcially feeling well.  Her labs are adequate.  Reviewed that her cardiac MUGA scan is normal.  She is agreeable to continue with Herceptin today.  We reviewed her treatment plan and she will complete Herceptin in end of May 2018.     -Discussed about adjuvant endocrine therapy with anastrozole.  She received the drug information.  She will start today.   -RTC in every 3 weeks for Herceptin and every 6 weeks provider visit.      #.  Right-sided chest wall pain, intermittent.   No reproducible pain.  Port has been functioning good.  No injury to suspect rib fracture.  Breast MR in October was negative.   Will obtain chest x-ray to make sure the port is in the correct location.  I explained to her that we would like to keep the port for her convenience.    #.  Bilateral  watery eyes and blurry visions   Referral to ophthalmology.    #.  Left upper extremity restricted range of motion.   -She is doing physical exercises and follow with Dr. Domingo.     #. Depression/anxiety- chronic with recent exacerbation due to recent diagnosis of breast cancer.   -Feeling overall stable.    Problem List    1. Blurry vision, bilateral  Ambulatory referral to Ophthalmology   2. Malignant neoplasm of upper-outer quadrant of left breast in female, estrogen receptor positive  CC OFFICE VISIT LONG    Infusion Appointment    Infusion Appointment    CC OFFICE VISIT LONG    DISCONTINUED: sodium chloride 0.9% 250 mL infusion    DISCONTINUED: trastuzumab 400 mg in sodium chloride 0.9% 250 mL chemo (HERCEPTIN)    DISCONTINUED: sodium chloride flush 20 mL (NS)    DISCONTINUED: heparin 100 unit/mL lockflush (PF) porcine 300-600 Units    DISCONTINUED: diphenhydrAMINE injection 50 mg (BENADRYL)    DISCONTINUED: famotidine 20 mg/2 mL injection 20 mg (PEPCID)    DISCONTINUED: hydrocortisone sod succ (PF) 100 mg/2 mL injection 100 mg    DISCONTINUED: acetaminophen tablet 1,000 mg (TYLENOL)   3. Right-sided chest wall pain  XR Chest 2 Views   4. Post-menopausal  DXA Bone Density Scan      ______________________________________________________________________________    Diagnosis  5/31/17  Stage IIIA (pT2, pN2a, M0) invasive ductal carcinoma of the left breast   - ER (high positive, 80-90%), LA (high positive, 90%) HER2 (+ by FISH by avg HER2 signals 5.3, HER2/CEP17 ratio 2.3)  - Horacio grade 3  - Tumor size: 26 mm  - Margins positive and posterior margin for invasive component and positive for posterior margin for DCIS.  - Angiolymphatic invasion present.  - 5/12 left axillary lymph node positive for carcinoma  - associated DCIS, grade 2, solid and cribriform with focal comedonecrosis.  20%,      PET scan did not show distant metastases.      Therapy to date:  5/31/17 - left partial mastectomy and Left  axillary lymph node dissection  6/23/17- 10/6/17-C#1-6 TCHP; added neulasta support to TCHP starting C#2.  Herceptin to complete one year.  1/22/2018-completed adjuvant radiation to the left breast 6040 cGy/33 fractions    Cardiac MUGA  6/16/17- LVEF 59.4%  9/15/17- LVEF 59%     History of Present Illness  She presented herself today.      She is here for cycle 14 for Herceptin. She continues struggling with depression, anxiety and seeing Josselyn Bowles for that.  Keysha had ovarian cyst but initially thought it was cancer and they were very worried about it.   She reported pain in the right chest started yesterday.  She was thinking it was related to the port and she is wondering whether she should remove the port.  No chest pain with breathing.  Noted pain with movement.  No breast pain.     Pain Status  Currently in Pain: Yes    Review of Systems    Constitutional  Constitutional (WDL): Exceptions to WDL  Fatigue: Fatigue not relieved by rest, limiting self care ADL  Chills: Mild sensation of cold, shivering, chattering of teeth  Neurosensory  Neurosensory (WDL): Exceptions to WDL  Peripheral Motor Neuropathy: Asymptomatic, clinical or diagnostic observations only, intervention not indicated  Ataxia: Asymptomatic, clinical or diagnostic observations only, intervention not indicated (uses cane)  Peripheral Sensory Neuropathy: Moderate symptoms, limiting instrumental ADL (N/T in fingers and feet)  Eye   Eye Disorder (WDL): Exceptions to WDL  Blurred Vision: Intervention not indicated  Watering Eyes: Intervention not indicated  Ear  Ear Disorder (WDL): Exceptions to WDL  Tinnitus: Mild symptoms, intervention not indicated (Chronic)  Cardiovascular  Cardiovascular (WDL): All cardiovascular elements are within defined limits  Pulmonary  Respiratory (WDL): Exceptions to WDL  Cough: Mild symptoms, nonprescription intervention indicated  Dyspnea: Shortness of breath with moderate exertion  Gastrointestinal  Gastrointestinal  (WDL): Exceptions to WDL  Anorexia: Loss of appetite without alteration in eating habits  Constipation: Occasional or intermittent symptoms, occasional use of stool softeners, laxatives, dietary modification, or enema  Nausea: Loss of appetite without alteration in eating habits  Dysgeusia: Altered taste but no change in diet  Dry Mouth: Symptomatic (e.g., dry or thick saliva) without significant dietary alteration, unstimulated saliva flow >0.2 ml/min  Genitourinary  Genitourinary (WDL): Exceptions to WDL  Urinary Tract Pain: Mild pain  Lymphatic  Lymph (WDL): Exceptions to WDL  Lymph Node Discomfort: Yes  Lymph Node Discomfort Location: Right underarm  Musculoskeletal and Connective Tissue  Musculoskeletal and Connetive Tissue Disorders (WDL): Exceptions to WDL  Arthralgia: Mild pain  Bone Pain: Mild pain  Muscle Weakness : Symptomatic, evident on physical exam, limiting instrumental ADL  Myalgia: Mild pain  Integumentary  Integumentary (WDL): Exceptions to WDL  Alopecia: Hair loss of up to 50% of normal for that individual that is not obvious from a distance but only on close inspection, a different hair style may be required to cover the hair loss but it does not require a wig or hair piece to camouflage  Rash Maculo-Papular: Macules/papules covering 10 - 30% BSA with or without symptoms (e.g., pruritus, burning, tightness), limiting instrumental ADL  Pruritus: Mild or localized, topical intervention indicated  Patient Coping  Patient Coping: Accepting  Distress Assessment  Distress Assessment Score: 8 (Pain, swelling, fatigue)  Accompanied by  Accompanied by: Alone  Oral Chemo Adherence       Past History  Past Medical History:   Diagnosis Date     Anxiety      Breast cancer 05/31/2017    left breast     Neuropathy      Panic attack        Physical Exam    Recent Vitals 3/2/2018   Height -   Weight 146 lbs 10 oz   BSA (m2) -   /77   Pulse 68   Temp 98.2   Temp src 1   SpO2 98   Some recent data might be  hidden     General: alert, awake, not in acute distress  HEENT: Head: Normal, normocephalic, atraumatic.  Eye: Normal external eye, conjunctiva, lids cornea, BALA.  Ears: Non-tender.  Nose: Normal external nose, mucus membranes and septum.  Pharynx: Dental Hygiene adequate. Normal buccal mucosa.  Normal pharynx.  Neck / Thyroid: Supple, no masses, nodes, nodules or enlargement.  Lymphatics: No abnormally enlarged lymph nodes.  Chest: Normal chest wall and respirations. Clear to auscultation.  Breast: Left breast showed well-healed scar with some skin erythema as expected.  Limited range of motion in the left shoulder.  No palpable adenopathy no masses.  Heart: S1 S2 RRR, no murmur.   Abdomen: abdomen is soft without significant tenderness, masses, organomegaly or guarding  Extremities: normal strength, tone, and muscle mass  Skin: normal.  No rash.  CNS: non focal.      Lab Results    Recent Results (from the past 168 hour(s))   NM Muga   Result Value Ref Range    MUGA EF 63.53 %    MUGA PRIOR EF 56.57 %   Comprehensive Metabolic Panel   Result Value Ref Range    Sodium 144 136 - 145 mmol/L    Potassium 4.2 3.5 - 5.0 mmol/L    Chloride 109 (H) 98 - 107 mmol/L    CO2 25 22 - 31 mmol/L    Anion Gap, Calculation 10 5 - 18 mmol/L    Glucose 113 70 - 125 mg/dL    BUN 9 8 - 22 mg/dL    Creatinine 0.72 0.60 - 1.10 mg/dL    GFR MDRD Af Amer >60 >60 mL/min/1.73m2    GFR MDRD Non Af Amer >60 >60 mL/min/1.73m2    Bilirubin, Total 0.2 0.0 - 1.0 mg/dL    Calcium 9.1 8.5 - 10.5 mg/dL    Protein, Total 7.3 6.0 - 8.0 g/dL    Albumin 3.6 3.5 - 5.0 g/dL    Alkaline Phosphatase 94 45 - 120 U/L    AST 16 0 - 40 U/L    ALT 19 0 - 45 U/L   Magnesium (add-ons/treatment plan)   Result Value Ref Range    Magnesium 1.9 1.8 - 2.6 mg/dL   HM1 (CBC with Diff)   Result Value Ref Range    WBC 3.9 (L) 4.0 - 11.0 thou/uL    RBC 3.63 (L) 3.80 - 5.40 mill/uL    Hemoglobin 10.9 (L) 12.0 - 16.0 g/dL    Hematocrit 32.5 (L) 35.0 - 47.0 %    MCV 90 80 -  100 fL    MCH 30.0 27.0 - 34.0 pg    MCHC 33.5 32.0 - 36.0 g/dL    RDW 13.5 11.0 - 14.5 %    Platelets 190 140 - 440 thou/uL    MPV 10.0 8.5 - 12.5 fL    Neutrophils % 64 50 - 70 %    Lymphocytes % 27 20 - 40 %    Monocytes % 8 2 - 10 %    Eosinophils % 2 0 - 6 %    Basophils % 0 0 - 2 %    Neutrophils Absolute 2.5 2.0 - 7.7 thou/uL    Lymphocytes Absolute 1.0 0.8 - 4.4 thou/uL    Monocytes Absolute 0.3 0.0 - 0.9 thou/uL    Eosinophils Absolute 0.1 0.0 - 0.4 thou/uL    Basophils Absolute 0.0 0.0 - 0.2 thou/uL       Imaging    Xr Chest 2 Views    Result Date: 3/2/2018  [facility] DATE: 3/2/2018 11:49 AM COMPARISON: 07/14/2017 CLINICAL HISTORY: Other chest pain TECHNIQUE: Frontal and lateral radiographs of the chest are provided. FINDINGS: The right subclavian chest port is unchanged from the prior examination. A few surgical clips are seen within the left axillary region. The lungs are clear. There is no pleural effusion or pneumothorax. The cardiomediastinal silhouette is normal. Limited visualized portions of the upper abdomen are grossly normal.     1.  Lungs are clear.    Nm Muga    Result Date: 3/1/2018    The left ventricular ejection fraction is 63.53%.   Normal study.   The measured left ventricular ejection fraction on the prior study date of 12/5/2017 was 56.57%.          Signed by: RASTA Bonds

## 2021-06-16 NOTE — PROGRESS NOTES
"Date Of Service: 03/08/18    Date last Seen:  12/28/17    PCP: Toña Barrera PA-C    Impression:   1.  Left arm swelling and left arm/shoulder tightness  2.  Secondary post mastectomy lymphedema  3.  Left shoulder contracture with cording  4.  Left shoulder pain with increased swelling-early cellulitis with rotator cuff injury  4.  Breast carcinoma (5/2017 left lumpectomy with LND, chemo/rad)     Plan:        1.  Questions were answered.  2.  Agree with therapy for shoulder rotator cuff injuries.  If persists will need MRI.  Orders clarified.  3.  Clindamycin for cellulitis and pain.  4.  Follow up in 2 months or when needed.    Time spent with patient 25 minutes, with greater than 50% time in consultation, education and coordination of care.  _____________________________________________________________________    Chief Complaint:  left arm swelling and left arm/shoulder tightness     History of Present Illness:  Linda Canada returns to the [facility] for her left arm swelling and left arm/shoulder tightness and numbness due to post mastectomy lymphedema with cording in the left axilla after being diagnosed with left breast carcinoma in May of 2017.  Specifically, per her oncologist Dr. Ferro she had \"Stage IIIA (pT2, pN2a, M0) invasive ductal carcinoma of the left breast- grade 3, ER (high positive), MD (positive) and HER2 (positive) with associated high-grade DCIS.  Final margins for invasive carcinoma and DCIS were positive.   Left breast lumpectomy, left axillary sentinel lymph node biopsy and a left axillary lymph node dissection were done on 5/31/17.  She is now done with her treatment.  She does her exercises and has the compression sleeve.  There is no new unexplained weight loss, loss of appetite, nausea and/or vomiting, shortness of breath, weight loss and chest pain. She has some peripheral neuropathic changes from the chemotherapy.  She continues to see her psychologist. She has been " starting to have increased pain in the left shoulder.  This has decreased her function with the arm.         Past Medical History:   Diagnosis Date     Anxiety      Breast cancer 05/31/2017    left breast     Neuropathy      Panic attack        Past Surgical History:   Procedure Laterality Date     BREAST SURGERY Left 05/31/2017    left lumpectomy         Current Outpatient Prescriptions:      clindamycin (CLEOCIN) 300 MG capsule, Take 1 capsule (300 mg total) by mouth 3 (three) times a day for 10 days., Disp: 30 capsule, Rfl: 0     docusate sodium (COLACE) 100 MG capsule, Take 100 mg by mouth at bedtime as needed for constipation., Disp: , Rfl:      FLUoxetine (PROZAC) 20 MG capsule, Take 40 mg by mouth daily. , Disp: , Rfl:      hydrOXYzine (VISTARIL) 50 MG capsule, Take 50 mg by mouth 3 (three) times a day as needed for itching., Disp: , Rfl:      LORazepam (ATIVAN) 0.5 MG tablet, Take 0.5 mg by mouth daily as needed. , Disp: , Rfl:      naproxen (NAPROSYN) 375 MG tablet, , Disp: , Rfl:      NEOMYCIN-BACITRACIN-POLYMYXIN ointment, , Disp: , Rfl:      QUEtiapine (SEROQUEL) 50 MG tablet, , Disp: , Rfl:      ranitidine (ZANTAC) 150 MG tablet, Take 150 mg by mouth. prn, Disp: , Rfl:      sulfamethoxazole-trimethoprim (SEPTRA DS) 800-160 mg per tablet, Take 1 tablet by mouth 2 (two) times a day. , Disp: , Rfl:      traZODone (DESYREL) 100 MG tablet, , Disp: , Rfl:      omeprazole (PRILOSEC) 20 MG capsule, TAKE 1 CAPSULE(20 MG) BY MOUTH DAILY BEFORE BREAKFAST, Disp: 30 capsule, Rfl: 0    Current Facility-Administered Medications:      heparin 100 unit/mL lockflush (PF) porcine 300-600 Units, 300-600 Units, Intravenous, PRN, Bharat Fagan MD, 600 Units at 11/20/17 0919    Allergies   Allergen Reactions     Penicillins Rash       Social History     Social History     Marital status: Single     Spouse name: Gisella     Number of children: 0     Years of education: N/A     Occupational History     NA      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  Has been with partner for 5 years.  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         Family History   Problem Relation Age of Onset     No Medical Problems Mother      Lung cancer Father 78     Diabetes Sister      Skin cancer Sister      Hypertension Sister        Review of Systems:  Review of systems is otherwise negative, except as noted above.  Full 12 point review of systems was completed.    Imaging:  Xr Chest 2 Views    Result Date: 3/2/2018  [facility] DATE: 3/2/2018 11:49 AM COMPARISON: 07/14/2017 CLINICAL HISTORY: Other chest pain TECHNIQUE: Frontal and lateral radiographs of the chest are provided. FINDINGS: The right subclavian chest port is unchanged from the prior examination. A few surgical clips are seen within the left axillary region. The lungs are clear. There is no pleural effusion or pneumothorax. The cardiomediastinal silhouette is normal. Limited visualized portions of the upper abdomen are grossly normal.     1.  Lungs are clear.    Nm Muga    Result Date: 3/1/2018    The left ventricular ejection fraction is 63.53%.   Normal study.   The measured left ventricular ejection fraction on the prior study date of 12/5/2017 was 56.57%.        Labs:  No results found for: SEDRATE      No results found for: CRP        Lab Results   Component Value Date    CREATININE 0.72 03/02/2018      No results found for: HGBA1C        Lab Results   Component Value Date    BUN 9 03/02/2018              Lab Results   Component Value Date    ALBUMIN 3.6 03/02/2018       No results found for:  [id number]    No results found for: TSH  No components found for: [id number]      Physical Exam:  Vitals:    03/08/18 1032   BP: 110/70   Pulse: 70   Resp: 16   Temp: 97.9  F (36.6  C)      BMI 24.72    Circumferential measures:    Vasc Edema 9/25/2017 12/28/2017 3/8/2018   Right-just above MCP 14.5 18 17.2   Right Wrist 14.7 15.3 15   Right Up 10cm 21.0 18.8 18.5   Right Up 10cm From Elbow 29.0 27.8 28.5   Left-just above MCP 17.8 18.3 17.5   Left Wrist 15.0 15.1 14.7   Left Up 10cm 21.0 19.3 18.2   Left Up 10cm From Elbow 30.5 29.3 29     Fairly stable    General:  63 y.o. female in no apparent distress.  Alert and oriented x 3.  Cooperative. Affect normal.    Range of Motion: Range of motion of the neck is full and Spurling's maneuver is negative bilaterally.  Range of motion of elbows, wrists is normal bilaterally without active joint synovitis, erythema, joint swelling, crepitus or joint laxity.   Right shoulder range of motion is full and left is to 110 forward flexion and abduction with significant pain.  She is able to do abduction of the left shoulder.  Supraspinatus testing of the left arm is very painful.  There is pain on palpation of the left anterior shoulder and also in the area of the AC joint.   There continues to be cording on the left anterior axilla.  This is all stable.      Strength:  Normal strength testing in shoulder abduction, elbow flexion, elbow extension, wrist extension, forearm supination, forearm pronation and hand intrinsics bilaterally.       Lymph nodes: No cervical, supraclavicular, infraclavicular, or axillary lymphadenopathy palpated.     Vascular: No unusual venous distention.  Radial arterial pulses strong and equal at the wrists bilaterally.      Skin: No unusual rubor, calor, masses or rashes along the skin in either arm, except or slight increased pain and rubor on left medial distal upper warm which is painful to palpation.   No significant fibrosity or scarring.  No pain  to palpation.     Margaret Domingo MD, ABWMS, FACCWS, [facility]  Medical Director Wound Care and Lymphedema  [facility]  [phone]

## 2021-06-16 NOTE — PROGRESS NOTES
Pt arrived ambulatory with use of a cane - seated in chair 1. Accessed port a cath per sterile technique and obtained an excellent blood return. Labs were drawn. At 08:20 patient was escorted [facility] (by CHRIS Rush RN) for a visit with Dr. Abdoulaye Alexander RN

## 2021-06-16 NOTE — PROGRESS NOTES
[facility] Daily Progress     Patient Name: Linda Canada  Date: 3/28/2018  Visit #: 3  PTA visit #:  1    Date of evaluation: 3/26/2018  Referral Diagnosis: left rotator cuff injury with pain and decreased rom  Referring provider: Margaret Domingo  Visit Diagnosis:     ICD-10-CM    1. Injury of left rotator cuff, initial encounter S46.002A    2. Scar condition and fibrosis of skin L90.5    3. Contracture, left shoulder M24.512    4. Post-mastectomy lymphedema syndrome I97.2          Assessment:   Patient initially with left upper extremity on a gartered posture, improves after therapy, increased shoulder mobility.    Patient is benefitting from skilled physical therapy and is making steady progress toward functional goals.  Patient is appropriate to continue with skilled physical therapy intervention, as indicated by initial plan of care.    Goal Status:  Pt. will demonstrate/verbalize independence in self-management of condition in : 4 weeks;Progressing toward  Pt. will have improved quality of sleep: with less pain;waking less times/night;in 4 weeks;Progressing toward  Patient will reach / maintain arm movement: forward;overhead;behind;for work;for home chores;for dressing;with full ROM;with less pain;in 4 weeks  Comment: decrease fatigue  No Data Recorded    Plan / Patient Education:     Continue with initial plan of care.  Progress with home program as tolerated. MFR, conditioning, strengthening/ stretching exercises, shoulder exercises.HEP.    Subjective:   Pt reports she can't sleep on her left side.   She has been compliant with her HEP.  Pain Ratin-5        Objective:   L shoulder ROM:  Flexion: 80 degrees   Abduction: 60 degrees  ER: 40 degrees  IR: thumb to sacrum  Left scapular tenderness.    Treatment Today     TREATMENT MINUTES COMMENTS   Evaluation     Self-care/ Home management     Manual therapy 25 MFR/STM to L upper traps, L pec major, L scalene and SCM L cervical paraspinals  PROM  L shoulder, left arm pull  on right side lying.   Neuromuscular Re-education     Therapeutic Activity     Therapeutic Exercises 30 UBE x3'  See flow sheet.  Bent over rows  -SL ER  -wand ER and AB (seated)   Gait training     Modality__________________                Total 55    Blank areas are intentional and mean the treatment did not include these items.       Chitra Westfall,CLT-ROBERT  3/28/2018

## 2021-06-16 NOTE — PROGRESS NOTES
Met with Linda to check in and inquire about her needs.  She expressed appreciation for knowledge of transportation provided by her health plan and states it is working well.      Linda has concerns about the health and well being of her partner Alma.  Alma is uninsured and has recent health concerns relating to ovarian cysts according to Linda.  Jennifer Ventura RN

## 2021-06-16 NOTE — PROGRESS NOTES
"Linda's psychiatric nurse practitioner, Jesusita Solitario ([phone]) from [facility] in [place] return my call today.  She sees Linda on a regular basis with her last visit being on 2/22/2018. Linda also sees the , Anyi at their clinic with her last visit with Anyi being on 3/1/2018.  In addition, she works with another , Charlie will also assess with resources particularly within the  culture.    Jesusita has Linda diagnosed with a panic disorder, major depression, and generalized anxiety disorder.  She currently is on trazodone 50 mg 1-2 tablets a night; Seroquel 50 mg 1-2 at bedtime; Ativan 0.5 mg with a 20 tablet supply per month; Fluoxotine 60 mg per day; and Vistaril 3 times a day as needed.    We discussed issues regarding Linda's self-injurious thoughts.  Jesusita reviewed the records and Linda does not have any history that she is aware of of cutting or other forms of self injurious behavior.  Her records indicate that Linda has stated that she would be \"better off dead\", but has not had any active suicidal ideation.      Now that release of information is in place, we will continue to coordinate care between our clinic and the [facility].    Josselyn Bowles MA, LP, LICSW  "

## 2021-06-16 NOTE — PROGRESS NOTES
Patient here ambulatory with a cane for follow up s/p breast radiation.  Patient states she continues to have fatigue and pain in the left breast and axilla.  Patient states she is in a bad mood and having a very difficulty day emotionally today.  Per patient and chart medication dosing has been adjusted.  Seen by Dr. Pinzon.  Plan RTC for follow up as directed by physician.

## 2021-06-17 NOTE — PROGRESS NOTES
[facility] Daily Progress     Patient Name: Linda Canada  Date: 2018  Visit #: 4  PTA visit #:  1    Date of evaluation: 3/26/2018  Referral Diagnosis: left rotator cuff injury with pain and decreased rom  Referring provider: Margaret Domingo  Visit Diagnosis:     ICD-10-CM    1. Injury of left rotator cuff, subsequent encounter S46.002D    2. Contracture, left shoulder M24.512    3. Scar condition and fibrosis of skin L90.5    4. Post-mastectomy lymphedema syndrome I97.2          Assessment:     Left shoulder pain with abduction.  Also left breast upper quadrant sensitive to light touch.    Patient is benefitting from skilled physical therapy and is making steady progress toward functional goals.  Patient is appropriate to continue with skilled physical therapy intervention, as indicated by initial plan of care.    Goal Status:  Pt. will demonstrate/verbalize independence in self-management of condition in : 4 weeks;Progressing toward  Pt. will have improved quality of sleep: waking less times/night;in 4 weeks  Patient will reach / maintain arm movement: forward;overhead;behind;for home chores;for dressing;with partial ROM;with less pain;in 4 weeks  Comment: decrease fatigue      Plan / Patient Education:     Continue with initial plan of care.  Progress with home program as tolerated. MFR, conditioning, strengthening/ stretching exercises, shoulder exercises.HEP.    Subjective:   Pt reports she noted increased left shoulder breast pain after .  Pain Ratin-5        Objective:   L shoulder ROM:  Flexion: 80 degrees   Abduction: 60 degrees  ER: 40 degrees  IR: thumb to sacrum  Left scapular tenderness.    Left hand tingling.    Treatment Today     TREATMENT MINUTES COMMENTS   Evaluation     Self-care/ Home management     Manual therapy 25 MFR/STM to L upper traps, L pec major, L scalene and SCM L cervical paraspinals  PROM L shoulder, left arm pull  on right side lying.   Neuromuscular  Re-education     Therapeutic Activity     Therapeutic Exercises 30 UBE x3'  See flow sheet.  Bent over rows  -SL ER  -wand ER and AB (seated)   Gait training     Modality__________________                Total 55    Blank areas are intentional and mean the treatment did not include these items.       Chitra Westfall,CLT-ROBERT  4/2/2018

## 2021-06-17 NOTE — PROGRESS NOTES
"[facility] Daily Progress     Patient Name: Linda Canada  Date: 2018  Visit #: 7  PTA visit #:  1    Date of evaluation: 3/26/2018  Referral Diagnosis: left rotator cuff injury with pain and decreased rom  Referring provider: Margaret Domingo  Visit Diagnosis:     ICD-10-CM    1. Injury of left rotator cuff, subsequent encounter S46.002D    2. Contracture, left shoulder M24.512    3. Scar condition and fibrosis of skin L90.5    4. Post-mastectomy lymphedema syndrome I97.2          Assessment:     Patient with increased left shoulder pain today, decreases after the therapy.  Fatigue is improving patent started going to the gym with her partner.      Patient is benefitting from skilled physical therapy and is making steady progress toward functional goals.  Patient is appropriate to continue with skilled physical therapy intervention, as indicated by initial plan of care.    Goal Status:  Pt. will demonstrate/verbalize independence in self-management of condition in : 4 weeks;Progressing toward  Pt. will have improved quality of sleep: waking less times/night;in 4 weeks;Progressing toward  Patient will reach / maintain arm movement: forward;overhead;behind;for home chores;for dressing;with partial ROM;with less pain;in 4 weeks;Progressing toward  Comment: decrease fatigue      Plan / Patient Education:     Continue with initial plan of care.  Progress with home program as tolerated. MFR, conditioning, strengthening/ stretching exercises, shoulder exercises.HEP.    Subjective:     Pain Ratin-4  \"my left shoulder is hurting\".        Objective:     L shoulder ROM:  Flexion: 90 degrees   Abduction: 90 degrees  ER: 40 degrees  IR: thumb to sacrum  Left medial deltoid tenderness.    Left hand tingling.    Treatment Today     TREATMENT MINUTES COMMENTS   Evaluation     Self-care/ Home management     Manual therapy 25 MFR/STM to L upper traps, L pec major, L scalene and SCM L cervical paraspinals  PROM L " shoulder, left arm pull  on right side lying.  Strumming and tissue bending to pectoralis mayor and left upper trap.   Neuromuscular Re-education     Therapeutic Activity     Therapeutic Exercises 30 Exercises:  Exercise #1: pendulum, wand  Comment #1: shoulder rolls, cervical lateral stretch  Exercise #2: nu step: seat 5, arms 10, level 1, with limited reciprocal arm motion x 5 minutes, left shoulder pain used one arm right  Comment #2: pulleys: flexion 85o , abduction 80o  Exercise #3: bilateral shoulder extension with orange T band  Comment #3: holding unto rail at parallel bars: marching, toes up/heels up, hip abduction, adduction, extension x 10 repetitions  Exercise #4: UBE: level 1 x  13 minutes  Comment #4: balance activities: eyes open/close, reaching forward/side/back with moderate difficulty, one leg standing eyes open/close       Gait training     Modality__________________                Total 55    Blank areas are intentional and mean the treatment did not include these items.       ELLA Ralph-ROBERT  4/16/2018

## 2021-06-17 NOTE — PROGRESS NOTES
[facility] Hematology and Oncology Progress Note    Patient: Linda Canada  MRN: [id number]  Date of Service: 4/17/2018        Reason for Visit    Follow-up breast cancer on adjuvant chemotherapy.    Assessment and Plan  Malignant neoplasm of upper-outer quadrant of left female breast    Staging form: Breast, AJCC 7th Edition    - Clinical: No stage assigned - Unsigned    - Pathologic stage from 6/14/2017: Stage IIIA (T2, N2a, cM0) - Signed by Joie Ferro MD on 6/14/2017          ER Status: Positive          NJ Status: Positive          HER2 Status: Positive    ECOG Performance   ECOG Performance Status: 2     Distress Assessment  Distress Assessment Score: No distress    Pain  Currently in Pain: Yes  Pain Score (Initial OR Reassessment): 6  Location: left arm      # Stage IIIA (pT2, pN2a, M0) invasive ductal carcinoma of the left breast- grade 3, ER (high positive), NJ (positive) and HER2 (positive) with associated high-grade DCIS.  Final margins for invasive carcinoma and DCIS are positive.  S/p left breast lumpectomy, left axillary sentinel lymph node biopsy and a left axillary lymph node dissection on 5/31/17. S/p adjuvant chemotherapy TCHP x 6 cycles, followed by adjuvant radiation.  She is currently on adjuvant endocrine therapy with anastrozole and finishing one year of Herceptin.   - She is physcially feeling well.  Her labs are adequate.  She is agreeable to continue with Herceptin today.  We reviewed her treatment plan and she will complete Herceptin in end of May 2018.     -She tolerates adjuvant endocrine therapy with anastrozole.  Refills given.   -RTC in every 3 weeks for Herceptin and every 6 weeks provider visit on 5/25/18.      #.  Right-sided chest wall pain, intermittent.   No reproducible pain.  Port has been functioning good.  She does not want to keep the port but she is afraid of using her peripheral veins.  She finally decided to keep the port until she finished the Herceptin in end of  May.  Then we will remove the port.      #.  Left upper extremity restricted range of motion.   -She is doing physical exercises and follow with Dr. Domingo.     #. Depression/anxiety- chronic with recent exacerbation due to recent diagnosis of breast cancer.   -Feeling overall stable.    Problem List    1. Malignant neoplasm of upper-outer quadrant of left breast in female, estrogen receptor positive  [facility] OFFICE VISIT LONG    Infusion Appointment    Infusion Appointment    [facility] OFFICE VISIT LONG    DISCONTINUED: sodium chloride 0.9% 250 mL infusion    DISCONTINUED: trastuzumab 420 mg in sodium chloride 0.9% 250 mL chemo (HERCEPTIN)    DISCONTINUED: sodium chloride flush 20 mL (NS)    DISCONTINUED: heparin 100 unit/mL lockflush (PF) porcine 300-600 Units    DISCONTINUED: diphenhydrAMINE injection 50 mg (BENADRYL)    DISCONTINUED: famotidine 20 mg/2 mL injection 20 mg (PEPCID)    DISCONTINUED: hydrocortisone sod succ (PF) 100 mg/2 mL injection 100 mg    DISCONTINUED: acetaminophen tablet 1,000 mg (TYLENOL)      ______________________________________________________________________________    Diagnosis  5/31/17  Stage IIIA (pT2, pN2a, M0) invasive ductal carcinoma of the left breast   - ER (high positive, 80-90%), NM (high positive, 90%) HER2 (+ by FISH by avg HER2 signals 5.3, HER2/CEP17 ratio 2.3)  - Horacio grade 3  - Tumor size: 26 mm  - Margins positive and posterior margin for invasive component and positive for posterior margin for DCIS.  - Angiolymphatic invasion present.  - 5/12 left axillary lymph node positive for carcinoma  - associated DCIS, grade 2, solid and cribriform with focal comedonecrosis.  20%,      PET scan did not show distant metastases.      Therapy to date:  5/31/17 - left partial mastectomy and Left axillary lymph node dissection  6/23/17- 10/6/17-C#1-6 TCHP; added neulasta support to TCHP starting C#2.  Herceptin to complete one year.  1/22/2018-completed adjuvant radiation to the left  breast 6040 cGy/33 fractions  3/2/2018-initiated adjuvant endocrine therapy with anastrozole.    Cardiac MUGA  6/16/17- LVEF 59.4%  9/15/17- LVEF 59%   12/5/17-LVEF 56.57%  3/1/18- LVEF 63.53%    History of Present Illness  She presented herself today.      She is here for cycle 15 for Herceptin. She has gained about 8 pounds.  She has increased hot flushes related to anastrozole.  She has intermittent nausea.  She also continued to have left arm pain and doing physical therapy for that.  She is planning for a trip to [place] after she finished Herceptin for a couple of months.  She is looking forward to that.      Pain Status  Currently in Pain: Yes    Review of Systems    Constitutional  Constitutional (WDL): Exceptions to WDL  Fatigue: Fatigue not relieved by rest - Limiting instrumental ADL  Fever: None  Chills: None  Weight Gain: 5 - <10% from baseline (8# 3/8/18)  Weight Loss: None  Neurosensory  Neurosensory (WDL): Exceptions to WDL  Peripheral Motor Neuropathy: Asymptomatic, clinical or diagnostic observations only, intervention not indicated  Ataxia: Asymptomatic, clinical or diagnostic observations only, intervention not indicated (uses cane)  Peripheral Sensory Neuropathy: Moderate symptoms, limiting instrumental ADL  Confusion: None  Syncope: None  Eye   Eye Disorder (WDL): Exceptions to WDL  Blurred Vision: Intervention not indicated  Dry Eye: None  Eye Pain: None  Watering Eyes: Intervention not indicated  Ear  Ear Disorder (WDL): Exceptions to WDL  Ear Pain: None  Tinnitus: Mild symptoms, intervention not indicated  Cardiovascular  Cardiovascular (WDL): All cardiovascular elements are within defined limits  Pulmonary  Respiratory (WDL): Exceptions to WDL  Cough: None  Dyspnea: Shortness of breath with moderate exertion  Hypoxia: None  Gastrointestinal  Gastrointestinal (WDL): Exceptions to WDL  Anorexia: Loss of appetite without alteration in eating habits  Constipation: None  Diarrhea:  None  Dysphagia: Symptomatic, able to eat regular diet (intermittently feels like food gets stuck)  Esophagitis: None  Nausea: Loss of appetite without alteration in eating habits (intermittent)  Pharyngitis: None  Vomiting: None  Dysgeusia: None  Dry Mouth: Symptomatic (e.g., dry or thick saliva) without significant dietary alteration, unstimulated saliva flow >0.2 ml/min  Genitourinary  Genitourinary (WDL): All genitourinary elements are within defined limits  Lymphatic  Lymph (WDL): Exceptions to WDL  Lymph Node Discomfort: Yes  Lymph Node Discomfort Location: Left arm  Musculoskeletal and Connective Tissue  Musculoskeletal and Connetive Tissue Disorders (WDL): Exceptions to WDL  Arthralgia: Mild pain  Bone Pain: Mild pain  Muscle Weakness : Symptomatic, perceived by patient but not evident on physical exam  Myalgia: Moderate pain, limiting instrumental ADL (Left arm)  Integumentary  Integumentary (WDL): Exceptions to WDL  Alopecia: Hair loss of up to 50% of normal for that individual that is not obvious from a distance but only on close inspection, a different hair style may be required to cover the hair loss but it does not require a wig or hair piece to camouflage  Rash Maculo-Papular: Macules/papules covering 10 - 30% BSA with or without symptoms (e.g., pruritus, burning, tightness), limiting instrumental ADL (arms, legs)  Pruritus: Mild or localized, topical intervention indicated  Urticaria: None  Palmar-Plantar Erythrodysesthesia Syndrome: None  Flushing: None  Patient Coping  Patient Coping: Accepting  Distress Assessment  Distress Assessment Score: No distress  Accompanied by  Accompanied by: Alone  Oral Chemo Adherence       Past History  Past Medical History:   Diagnosis Date     Anxiety      Breast cancer 05/31/2017    left breast     Neuropathy      Panic attack        Physical Exam    Recent Vitals 4/13/2018   Height -   Weight 152 lbs 10 oz   BSA (m2) -   /70   Pulse 68   Temp 98.2   Temp src  1   SpO2 99   Some recent data might be hidden     General: alert, awake, not in acute distress  HEENT: Head: Normal, normocephalic, atraumatic.  Eye: Normal external eye, conjunctiva, lids cornea, BALA.  Ears: Non-tender.  Nose: Normal external nose, mucus membranes and septum.  Pharynx: Dental Hygiene adequate. Normal buccal mucosa.  Normal pharynx.  Neck / Thyroid: Supple, no masses, nodes, nodules or enlargement.  Lymphatics: No abnormally enlarged lymph nodes.  Chest: Normal chest wall and respirations. Clear to auscultation.  Breast: Left breast showed well-healed scar with some skin erythema as expected.  Limited range of motion in the left shoulder.  No palpable adenopathy no masses.  Heart: S1 S2 RRR, no murmur.   Abdomen: abdomen is soft without significant tenderness, masses, organomegaly or guarding  Extremities: normal strength, tone, and muscle mass  Skin: normal.  No rash.  CNS: non focal.      Lab Results    Recent Results (from the past 168 hour(s))   Comprehensive Metabolic Panel (add-ons/treatment plan)   Result Value Ref Range    Sodium 143 136 - 145 mmol/L    Potassium 4.4 3.5 - 5.0 mmol/L    Chloride 105 98 - 107 mmol/L    CO2 27 22 - 31 mmol/L    Anion Gap, Calculation 11 5 - 18 mmol/L    Glucose 111 70 - 125 mg/dL    BUN 13 8 - 22 mg/dL    Creatinine 0.74 0.60 - 1.10 mg/dL    GFR MDRD Af Amer >60 >60 mL/min/1.73m2    GFR MDRD Non Af Amer >60 >60 mL/min/1.73m2    Bilirubin, Total 0.3 0.0 - 1.0 mg/dL    Calcium 9.8 8.5 - 10.5 mg/dL    Protein, Total 7.3 6.0 - 8.0 g/dL    Albumin 3.7 3.5 - 5.0 g/dL    Alkaline Phosphatase 83 45 - 120 U/L    AST 16 0 - 40 U/L    ALT 14 0 - 45 U/L   Magnesium (add-ons/treatment plan)   Result Value Ref Range    Magnesium 2.0 1.8 - 2.6 mg/dL   HM1 (CBC with Diff)   Result Value Ref Range    WBC 4.9 4.0 - 11.0 thou/uL    RBC 3.45 (L) 3.80 - 5.40 mill/uL    Hemoglobin 10.4 (L) 12.0 - 16.0 g/dL    Hematocrit 31.1 (L) 35.0 - 47.0 %    MCV 90 80 - 100 fL    MCH 30.1  27.0 - 34.0 pg    MCHC 33.4 32.0 - 36.0 g/dL    RDW 12.7 11.0 - 14.5 %    Platelets 198 140 - 440 thou/uL    MPV 9.9 8.5 - 12.5 fL    Neutrophils % 70 50 - 70 %    Lymphocytes % 21 20 - 40 %    Monocytes % 8 2 - 10 %    Eosinophils % 1 0 - 6 %    Basophils % 0 0 - 2 %    Neutrophils Absolute 3.4 2.0 - 7.7 thou/uL    Lymphocytes Absolute 1.0 0.8 - 4.4 thou/uL    Monocytes Absolute 0.4 0.0 - 0.9 thou/uL    Eosinophils Absolute 0.1 0.0 - 0.4 thou/uL    Basophils Absolute 0.0 0.0 - 0.2 thou/uL       Imaging    No results found.      Signed by: RASTA Bonds

## 2021-06-17 NOTE — PROGRESS NOTES
Pt came into infusion clinic for Day, Cycle 16 of her treatment. Labs Reviewed. Medications explained to pt who verbalized understanding. Port patent throughout infusion. Pt tolerated infusion with no complications. Pt c/o itching and rash on arms and legs. Nothing seen today. Pt states it happens when she gets a hot flash. Pt was instructed to try Benadryl or Hydrocortisone cream which she can get OTC. Pt verbalized understanding of this. Pt left infusion clinic via ambulatory and will RTC as ordered.

## 2021-06-17 NOTE — PROGRESS NOTES
"[facility] Daily Progress     Patient Name: Linda Canada  Date: 5/2/2018  Visit #: 9  PTA visit #:  1    Date of evaluation: 3/26/2018  Referral Diagnosis: left rotator cuff injury with pain and decreased rom  Referring provider: Margaret Domingo  Visit Diagnosis:     ICD-10-CM    1. Injury of left rotator cuff, subsequent encounter S46.002D    2. Contracture, left shoulder M24.512    3. Scar condition and fibrosis of skin L90.5    4. Post-mastectomy lymphedema syndrome I97.2    5. Drug-induced polyneuropathy G62.0          Assessment:     Left shoulder with increased mobility.  Endurance is improving.    Patient is benefitting from skilled physical therapy and is making steady progress toward functional goals.  Patient is appropriate to continue with skilled physical therapy intervention, as indicated by initial plan of care.    Goal Status:  Pt. will demonstrate/verbalize independence in self-management of condition in : 4 weeks;Progressing toward  Pt. will have improved quality of sleep: waking less times/night;in 4 weeks;Progressing toward  Patient will reach / maintain arm movement: forward;overhead;behind;for home chores;for dressing;with partial ROM;with less pain;in 4 weeks;Progressing toward  Comment: decrease fatigue      Plan / Patient Education:     Continue with initial plan of care.  Progress with home program as tolerated. MFR, conditioning, strengthening/ stretching exercises, shoulder exercises.HEP.    Subjective:     Pain Rating: 3-4  \"the top ofmy left shoulder is hurting, I can't sleep well\".        Objective:     L shoulder ROM:  Flexion: 102 degrees   Abduction: 75o degrees  Extension: 34o  ER: 40 degrees  IR: thumb to sacrum  Left medial deltoid and scapular tenderness.    Left hand tingling.    Treatment Today     TREATMENT MINUTES COMMENTS   Evaluation     Self-care/ Home management     Manual therapy 15 MFR/STM to L upper traps, L pec major, L scalene and SCM L cervical " paraspinals  PROM L shoulder, left arm pull  on right side lying.  Strumming and tissue bending to pectoralis mayor and left upper trap.  Left scapular mob.   Neuromuscular Re-education     Therapeutic Activity     Therapeutic Exercises 30 Exercises:  Exercise #1: pendulum, wand  Comment #1: shoulder rolls, cervical lateral stretch  Exercise #2: nu step: seat 5, arms 10, level 2, with limited reciprocal arm motion x 14 minutes, left shoulder pain used one arm right  Comment #2: pulleys: flexion 102o , abduction 75o  Exercise #3: bilateral shoulder extension with orange T band  Comment #3: holding unto rail at parallel bars: marching, toes up/heels up, hip abduction, adduction, extension x 10 repetitions  Exercise #4: UBE: level 1 x  13 minutes  Comment #4: balance activities: eyes open/close, reaching forward/side/back with moderate difficulty, one leg standing eyes open/close  Exercise #5: reach to roll.       Gait training     Modality______warm packs____________ 15 Left scapular/shoulder with 4 layers of towels, checked skin every 5 min, good skin tolerance, skin was intact.              Total 60    Blank areas are intentional and mean the treatment did not include these items.       ELLA Ralph-ROBERT  5/2/2018

## 2021-06-17 NOTE — PROGRESS NOTES
"[facility] Daily Progress     Patient Name: Linda Canada  Date: 2018  Visit #: 8  PTA visit #:  1    Date of evaluation: 3/26/2018  Referral Diagnosis: left rotator cuff injury with pain and decreased rom  Referring provider: Margaret Domingo  Visit Diagnosis:     ICD-10-CM    1. Injury of left rotator cuff, subsequent encounter S46.002D    2. Contracture, left shoulder M24.512    3. Scar condition and fibrosis of skin L90.5    4. Post-mastectomy lymphedema syndrome I97.2          Assessment:     Patient with nose dripping and eyes tears from side effect from chemo therapy.  Increased endurance.  Left shoulder ROM is gradually improving.    Patient is benefitting from skilled physical therapy and is making steady progress toward functional goals.  Patient is appropriate to continue with skilled physical therapy intervention, as indicated by initial plan of care.    Goal Status:  Pt. will demonstrate/verbalize independence in self-management of condition in : 4 weeks;Progressing toward  Pt. will have improved quality of sleep: waking less times/night;in 4 weeks;Progressing toward  Patient will reach / maintain arm movement: forward;overhead;behind;for home chores;for dressing;with partial ROM;with less pain;in 4 weeks;Progressing toward  Comment: decrease fatigue      Plan / Patient Education:     Continue with initial plan of care.  Progress with home program as tolerated. MFR, conditioning, strengthening/ stretching exercises, shoulder exercises.HEP.    Subjective:     Pain Ratin-4  \"my left shoulder is hurting\".        Objective:     L shoulder ROM:  Flexion: 90 degrees   Abduction: 80 degrees  ER: 40 degrees  IR: thumb to sacrum  Left medial deltoid and scapular tenderness.    Left hand tingling.    Treatment Today     TREATMENT MINUTES COMMENTS   Evaluation     Self-care/ Home management     Manual therapy 15 MFR/STM to L upper traps, L pec major, L scalene and SCM L cervical " paraspinals  PROM L shoulder, left arm pull  on right side lying.  Strumming and tissue bending to pectoralis mayor and left upper trap.  Left scapular mob.   Neuromuscular Re-education     Therapeutic Activity     Therapeutic Exercises 30 Exercises:  Exercise #1: pendulum, wand  Comment #1: shoulder rolls, cervical lateral stretch  Exercise #2: nu step: seat 5, arms 10, level 2, with limited reciprocal arm motion x 10 minutes, left shoulder pain used one arm right  Comment #2: pulleys: flexion 100o , abduction 65o  Exercise #3: bilateral shoulder extension with orange T band  Comment #3: holding unto rail at parallel bars: marching, toes up/heels up, hip abduction, adduction, extension x 10 repetitions  Exercise #4: UBE: level 1 x  13 minutes  Comment #4: balance activities: eyes open/close, reaching forward/side/back with moderate difficulty, one leg standing eyes open/close  Exercise #5: reach to roll.       Gait training     Modality______warm packs____________ 15 Left scapular/shoulder with 4 layers of towels, checked skin every 5 min, good skin tolerance, skin was intact.              Total 60    Blank areas are intentional and mean the treatment did not include these items.       Chitra Westfall,ELLA-ROBERT  4/18/2018

## 2021-06-17 NOTE — PROGRESS NOTES
Pt arrived ambulatory with use of a cane - and was seated in the lab draw chair. Accessed port a cath per sterile technique and obtained an excellent blood return. Labs were drawn. Pt then waited in the lobby for an appointment with Dr. Abdoulaye Alexander RN

## 2021-06-17 NOTE — PROGRESS NOTES
"[facility] Daily Progress     Patient Name: Linda Canada  Date: 2018  Visit #: 6  PTA visit #:  1    Date of evaluation: 3/26/2018  Referral Diagnosis: left rotator cuff injury with pain and decreased rom  Referring provider: Margaret Domingo  Visit Diagnosis:     ICD-10-CM    1. Injury of left rotator cuff, subsequent encounter S46.002D    2. Contracture, left shoulder M24.512    3. Scar condition and fibrosis of skin L90.5    4. Post-mastectomy lymphedema syndrome I97.2    5. Drug-induced polyneuropathy G62.0          Assessment:     Patient reports she was feeling ill, weak last time and had to cancel her therapy visit.  Patient started going to go back to the gym and working gradually with her legs.  Left shoulder ROM is improving.      Patient is benefitting from skilled physical therapy and is making steady progress toward functional goals.  Patient is appropriate to continue with skilled physical therapy intervention, as indicated by initial plan of care.    Goal Status:  Pt. will demonstrate/verbalize independence in self-management of condition in : 4 weeks;Progressing toward  Pt. will have improved quality of sleep: waking less times/night;in 4 weeks;Progressing toward  Patient will reach / maintain arm movement: forward;overhead;behind;for home chores;for dressing;with partial ROM;with less pain;in 4 weeks;Progressing toward  Comment: decrease fatigue      Plan / Patient Education:     Continue with initial plan of care.  Progress with home program as tolerated. MFR, conditioning, strengthening/ stretching exercises, shoulder exercises.HEP.    Subjective:     Pain Ratin-4  \"I'm feeling better today, I had to cancel last time due to feeling sick\".        Objective:   L shoulder ROM:  Flexion: 85 degrees   Abduction: 80 degrees  ER: 40 degrees  IR: thumb to sacrum  Left scapular tenderness.    Left hand tingling.    Treatment Today     TREATMENT MINUTES COMMENTS   Evaluation   "   Self-care/ Home management     Manual therapy 25 MFR/STM to L upper traps, L pec major, L scalene and SCM L cervical paraspinals  PROM L shoulder, left arm pull  on right side lying.   Neuromuscular Re-education     Therapeutic Activity     Therapeutic Exercises 30 Exercises:  Exercise #1: pendulum, wand  Comment #1: shoulder rolls, cervical lateral stretch  Exercise #2: nu step: seat 5, arms 10, level 1, with limited reciprocal arm motion x 10 minutes  Comment #2: pulleys: flexion 85o , abduction 80o  Exercise #3: bilateral shoulder extension with orange T band  Comment #3: holding unto rail at parallel bars: marching, toes up/heels up, hip abduction, adduction, extension x 10 repetitions  Exercise #4: UBE: level 1 x  13 minutes  Comment #4: balance activities: eyes open/close, reaching forward/side/back with moderate difficulty, one leg standing eyes open/close    Bent over rows  -SL ER  -wand ER and AB (seated)   Gait training     Modality__________________                Total 55    Blank areas are intentional and mean the treatment did not include these items.       Chitra Westfall,JOSET-ROBERT  4/12/2018

## 2021-06-17 NOTE — PROGRESS NOTES
Arm swelling therapy 2 mo f/u apt. Has one more week of therapy. Left arm and breast continues to be painful and swollen. Radiation finished 1 month ago. Continues with infusions. Back pain

## 2021-06-17 NOTE — PROGRESS NOTES
"Date Of Service: 05/10/18    Date last Seen:  03/08/18    PCP: Toña Barrera PA-C    Impression:   1.  Left arm swelling and left arm/shoulder tightness-stable  2.  Secondary post mastectomy lymphedema-stable  3.  Left shoulder contracture with cording  4.  Left shoulder pain-suspect significant rotator cuff injury  5.  Breast carcinoma (5/2017 left lumpectomy with LND, chemo/rad)     Plan:        1.  Questions were answered.  2.  With little improvement with antibiotics or therapy will get send her to orthopedics.  Referral written to Dr. Earl Cleary.    3.   Follow up in 3 months or when needed.    Time spent with patient 25 minutes, with greater than 50% time in consultation, education and coordination of care.  _____________________________________________________________________    Chief Complaint:  left arm swelling and left arm/shoulder tightness     History of Present Illness:  Linda Canada returns to the [facility] for her left arm swelling and left arm/shoulder tightness and numbness due to post mastectomy lymphedema with cording in the left axilla after being diagnosed with left breast carcinoma in May of 2017.   Dr. Ferro states she had \"Stage IIIA (pT2, pN2a, M0) invasive ductal carcinoma of the left breast- grade 3, ER (high positive), NJ (positive) and HER2 (positive) with associated high-grade DCIS.  Final margins for invasive carcinoma and DCIS were positive.   Left breast lumpectomy, left axillary sentinel lymph node biopsy and a left axillary lymph node dissection were done on 5/31/17.  She had chemotherapy and radiation.  She does her exercises and has the compression sleeve.  There is no new unexplained weight loss, loss of appetite, nausea and/or vomiting, shortness of breath, weight loss and chest pain. She has some peripheral neuropathic changes from the chemotherapy.  She continues to see her psychologist. She was experiencing increased pain in the " left shoulder.  This decreased her function with the arm.   I sent her to therapy.  She is here for follow up.  The shoulder is still very painful and she can't lift up the left arm easily.  Therapy did not seem to help and she is loosing more range of motion in her shoulder.  She is excited that she is marrying her partner of 5 years tomorrow.      Past Medical History:   Diagnosis Date     Anxiety      Breast cancer 05/31/2017    left breast     Neuropathy      Panic attack        Past Surgical History:   Procedure Laterality Date     BREAST SURGERY Left 05/31/2017    left lumpectomy         Current Outpatient Prescriptions:      anastrozole (ARIMIDEX) 1 mg tablet, Take 1 tablet (1 mg total) by mouth daily., Disp: 90 tablet, Rfl: 3     calcium-vitamin D 500 mg(1,250mg) -200 unit per tablet, Take 1 tablet by mouth 2 (two) times a day with meals., Disp: , Rfl: 0     docusate sodium (COLACE) 100 MG capsule, Take 100 mg by mouth at bedtime as needed for constipation., Disp: , Rfl:      FLUoxetine (PROZAC) 20 MG capsule, Take 40 mg by mouth daily. , Disp: , Rfl:      hydrOXYzine (VISTARIL) 50 MG capsule, Take 50 mg by mouth 3 (three) times a day as needed for itching., Disp: , Rfl:      LORazepam (ATIVAN) 0.5 MG tablet, Take 0.5 mg by mouth daily as needed. , Disp: , Rfl:      naproxen (NAPROSYN) 375 MG tablet, , Disp: , Rfl:      QUEtiapine (SEROQUEL) 50 MG tablet, , Disp: , Rfl:      ranitidine (ZANTAC) 150 MG tablet, Take 150 mg by mouth. prn, Disp: , Rfl:      traZODone (DESYREL) 100 MG tablet, , Disp: , Rfl:     Current Facility-Administered Medications:      heparin 100 unit/mL lockflush (PF) porcine 300-600 Units, 300-600 Units, Intravenous, PRN, Bharat Fagan MD, 600 Units at 11/20/17 0919    Allergies   Allergen Reactions     Penicillins Rash       Social History     Social History     Marital status: Single     Spouse name: Gisella     Number of children: 0     Years of education: N/A     Occupational  History     NA      Partner works      Social History Main Topics     Smoking status: Former Smoker     Packs/day: 1.50     Years: 12.00     Types: Cigarettes     Quit date: 6/16/2002     Smokeless tobacco: Never Used     Alcohol use No      Comment: states has not drank in a couple months     Drug use: No     Sexual activity: No     Other Topics Concern     Not on file     Social History Narrative    Female partner.  Has been with partner for 5 years. Getting  5/11/18.   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         Family History   Problem Relation Age of Onset     No Medical Problems Mother      Lung cancer Father 78     Diabetes Sister      Skin cancer Sister      Hypertension Sister        Review of Systems:  Review of systems is otherwise negative, except as noted above.  Full 12 point review of systems was completed.    Imaging:    I personally reviewed the following imaging today and those on care everywhere, if indicated    [facility]     DATE: 3/2/2018 11:49 AM     COMPARISON: 07/14/2017     CLINICAL HISTORY: Other chest pain     TECHNIQUE: Frontal and lateral radiographs of the chest are provided.     FINDINGS: The right subclavian chest port is unchanged from the prior examination. A few surgical clips are seen within the left axillary region. The lungs are clear. There is no pleural effusion or pneumothorax. The cardiomediastinal silhouette is   normal. Limited visualized portions of the upper abdomen are grossly normal.     IMPRESSION:   1.  Lungs are clear.    Labs:    I personally reviewed the following labs today and those on care everywhere, if indicated    No results found for: SEDRATE      No results found for: CRP        Lab Results   Component Value Date    CREATININE 0.74 04/13/2018      No results found for: HGBA1C        Lab Results   Component Value Date    BUN 13 04/13/2018              Lab Results   Component Value Date    ALBUMIN 3.7 04/13/2018        No results found for: [id number]    No results found for: TSH  No components found for: [id number]      Physical Exam:  Vitals:    05/10/18 1018   BP: 110/60   Pulse: 76   Resp: 12   Temp: 98  F (36.7  C)      BMI 25.52 (slightly increased)    Circumferential measures:    Vasc Edema 9/25/2017 12/28/2017 3/8/2018 5/10/2018   Right-just above MCP 14.5 18 17.2 17.5   Right Wrist 14.7 15.3 15 14.5   Right Up 10cm 21.0 18.8 18.5 18   Right Up 10cm From Elbow 29.0 27.8 28.5 28.5   Left-just above MCP 17.8 18.3 17.5 18   Left Wrist 15.0 15.1 14.7 15   Left Up 10cm 21.0 19.3 18.2 18   Left Up 10cm From Elbow 30.5 29.3 29 31     Fairly stable    General:  63 y.o. female in no apparent distress.  Alert and oriented x 3.  Cooperative. Affect normal.    Range of Motion: Range of motion of the neck is full and Spurling's maneuver is negative bilaterally.  Range of motion of elbows, wrists is normal bilaterally without active joint synovitis, erythema, joint swelling, crepitus or joint laxity.   Right shoulder range of motion is full but left is now decreased to 85 degrees of  forward flexion and abduction with significant pain.  There is pain on palpation of the left anterior shoulder and also in the area of the AC joint.   There continues to be cording on the left anterior axilla.       Strength:  Normal strength testing in  elbow flexion, elbow extension, wrist extension, forearm supination, forearm pronation and hand intrinsics bilaterally.       Lymph nodes: No cervical, supraclavicular, infraclavicular, or axillary lymphadenopathy palpated.     Vascular: No unusual venous distention.  Radial arterial pulses strong and equal at the wrists bilaterally.      Skin: No unusual rubor, calor, masses or rashes along the skin in either arm.  No significant fibrosity or scarring.  No pain to palpation.     Margaret Domingo MD, ABWMS, FACCWS, [facility]  Medical Director Wound Care and Lymphedema  [facility]  [place]  [phone]
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
## 2021-06-17 NOTE — PROGRESS NOTES
"[facility] Daily Progress     Patient Name: Linda Canada  Date: 5/8/2018  Visit #: 10  PTA visit #:  1    Date of evaluation: 3/26/2018  Referral Diagnosis: left rotator cuff injury with pain and decreased rom  Referring provider: Margaret Domingo  Visit Diagnosis:     ICD-10-CM    1. Injury of left rotator cuff, subsequent encounter S46.002D    2. Contracture, left shoulder M24.512    3. Scar condition and fibrosis of skin L90.5    4. Post-mastectomy lymphedema syndrome I97.2    5. Drug-induced polyneuropathy G62.0          Assessment:     Left shoulder with increased ROM but is painful with active motion.  Left anterior and medial deltoid tenderness.  dynamic balance and gait sequence improved, patient is using SEC.      Patient is benefitting from skilled physical therapy and is making steady progress toward functional goals.  Patient is appropriate to continue with skilled physical therapy intervention, as indicated by initial plan of care.    Goal Status:  Pt. will demonstrate/verbalize independence in self-management of condition in : 4 weeks;Progressing toward  Pt. will have improved quality of sleep: waking less times/night;in 4 weeks;Progressing toward  Patient will reach / maintain arm movement: forward;overhead;behind;for home chores;for dressing;with full ROM;with less pain;in 4 weeks;Progressing toward  Comment: decrease fatigue      Plan / Patient Education:     Continue with initial plan of care.  Progress with home program as tolerated. MFR, conditioning, strengthening/ stretching exercises, shoulder exercises.HEP.    Subjective:     Pain Rating: 3-4  \"the  left shoulder is hurting, I can't sleep well\".        Objective:     L shoulder ROM:  Flexion: 102 degrees   Abduction: 75o degrees  Extension: 34o  ER: 40 degrees  IR: thumb to sacrum  Left medial deltoid and scapular tenderness.    Left hand tingling.    Treatment Today     TREATMENT MINUTES COMMENTS   Evaluation     Self-care/ Home " management     Manual therapy 15 MFR/STM to L upper traps, L pec major, L scalene and SCM L cervical paraspinals  PROM L shoulder, left arm pull  on right side lying.  Strumming and tissue bending to pectoralis mayor and left upper trap.  Left scapular mob.   Neuromuscular Re-education     Therapeutic Activity     Therapeutic Exercises 30 Exercises:  Exercise #1: pendulum, wand  Comment #1: shoulder rolls, cervical lateral stretch  Exercise #2: nu step: seat 6, arms 10, level 2, with limited reciprocal arm motion x 14 minutes, left shoulder pain used one arm right  Comment #2: pulleys: flexion 102o , abduction 75o  Exercise #3: bilateral shoulder extension with orange T band  Comment #3: holding unto rail at parallel bars: marching, toes up/heels up, hip abduction, adduction, extension x 10 repetitions  Exercise #4: UBE: level 1 x  13 minutes  Comment #4: balance activities: eyes open/close, reaching forward/side/back with moderate difficulty, one leg standing eyes open/close  Exercise #5: reach to roll.       Gait training     Modality______warm packs____________ 15 Left scapular/shoulder with 4 layers of towels, checked skin every 5 min, good skin tolerance, skin was intact.              Total 60    Blank areas are intentional and mean the treatment did not include these items.       Chitra Westfall,ELLA-ROBERT  5/8/2018

## 2021-06-17 NOTE — PROGRESS NOTES
[facility] Daily Progress     Patient Name: Linda Canada  Date: 2018  Visit #: 5  PTA visit #:  1    Date of evaluation: 3/26/2018  Referral Diagnosis: left rotator cuff injury with pain and decreased rom  Referring provider: Margaret Domingo  Visit Diagnosis:     ICD-10-CM    1. Injury of left rotator cuff, subsequent encounter S46.002D    2. Contracture, left shoulder M24.512    3. Scar condition and fibrosis of skin L90.5    4. Post-mastectomy lymphedema syndrome I97.2    5. Injury of left rotator cuff, initial encounter S46.002A          Assessment:     Increased left shoulder ROM, decreased fatigue.  Gait sequence is improving, she still guards her left upper extremity.    Patient is benefitting from skilled physical therapy and is making steady progress toward functional goals.  Patient is appropriate to continue with skilled physical therapy intervention, as indicated by initial plan of care.    Goal Status:  Pt. will demonstrate/verbalize independence in self-management of condition in : 4 weeks;Progressing toward  Pt. will have improved quality of sleep: waking less times/night;in 4 weeks;Progressing toward  Patient will reach / maintain arm movement: forward;overhead;behind;for home chores;for dressing;with partial ROM;with less pain;in 4 weeks;Progressing toward  Comment: decrease fatigue      Plan / Patient Education:     Continue with initial plan of care.  Progress with home program as tolerated. MFR, conditioning, strengthening/ stretching exercises, shoulder exercises.HEP.    Subjective:     Pain Ratin-4  My shoulder is getting better but still wakes me up at night.        Objective:   L shoulder ROM:  Flexion: 85 degrees   Abduction: 70 degrees  ER: 40 degrees  IR: thumb to sacrum  Left scapular tenderness.    Left hand tingling.    Treatment Today     TREATMENT MINUTES COMMENTS   Evaluation     Self-care/ Home management     Manual therapy 25 MFR/STM to L upper traps, L pec  major, L scalene and SCM L cervical paraspinals  PROM L shoulder, left arm pull  on right side lying.   Neuromuscular Re-education     Therapeutic Activity     Therapeutic Exercises 30 Exercises:  Exercise #1: pendulum, wand  Comment #1: shoulder rolls, cervical lateral stretch  Exercise #2: nu step: seat 5, arms 10, level 1, with limited reciprocal arm motion x 10 minutes  Comment #2: pulleys: flexion, abduction  Exercise #3: bilateral shoulder extension with orange T band  Comment #3: holding unto rail at parallel bars: marching, toes up/heels up, hip abduction, adduction, extension x 10 repetitions  Exercise #4: UBE: level 1 x  13 minutes    Bent over rows  -SL ER  -wand ER and AB (seated)   Gait training     Modality__________________                Total 55    Blank areas are intentional and mean the treatment did not include these items.       Chitra Westfall,JOSET-ROBERT  4/4/2018

## 2021-06-17 NOTE — PROGRESS NOTES
"Day 1 Cycle 15 -- [facility] 11[facility], and was seated in chair 5. Reviewed plan of care. Used NS as the primary IV solution, and port has an excellent blood return. Infused trastuzumab 420 mg over 30 minutes, and then flushed the IV line with NS 50 mls. At completion the port a cath was flushed, heparinized, and then deaccessed. Ate 100% of lunch. Post infusion AVS was given and explained to the patient, and was given an appointment schedule. Ambulatory to the bathroom x 1, uses a cane. Writer took pt via w/c to the front lobby at 13:20 to wait for \"my taxi service.\" Ride was called for by Carol at .    Veronica Alexander RN  "

## 2021-06-17 NOTE — PROGRESS NOTES
[facility] , Sandhyaroly Silva ([phone]) contacted me today. Linda has signed a release for me to talk with her about Linda's care needs.  We discussed her case and Sandhya will plan to contact the , Anyi at [facility] to see about where they are at getting an [facility] worker for Linda.    Plan: I will continue to be available for emotional support and assistance as needed.    Josselyn Bowles MA, LP, LICSW

## 2021-06-18 NOTE — PROGRESS NOTES
"ACUPUNCTURIST TREATMENT NOTE    Name: Linda Canada  :  1955  MRN:  [id number]      Acupuncture Treatment  Patient Type: JN Pain  Intervention Reason: Neuropathy;Neuropathy 2;Pain  Pre-session Neuropathy ratin  Pre-session Neuropathy 2 ratin  Patient complaint:: Patient would like to focus on neuropathy. Feet are worse than fingers. Toes rating 9/10 and fingers rating 6/10. Numbess, tingling, burning, pain will all occur in feet. Patient also has pain and limited ROM in left shoulder. No pain rating given today. Over all patient has anxiety and panic attacks and difficulty sleeping. Complains of dry mouth, hot flashes and night sweating which she states are side effects of medications she is on. During chemotherapy digestion was very sluggish, but now she states she is more regular with BM's. Nausea has also diminished since ending chemotherapy.  Acupuncture (Points):: Liv3, Bafeng, Gb41, Ub62, Ki3, Sp6, Gb34, St36, Sp9, Li4, Si3, Tb5, (L) Gb21, Baihui, [place], Du24, Yintang  TCM Pulse: wiry  TCM Tongue: pale pink, thin white coat, red tip  Practitioner Observed: 30 minute treatment. Heat lamp over feet. Gentle tuina to left shoulder and posumon oil applied.  Treatment Plan/Follow up: Suggested patient schedule weekly appointments for 4-8 weeks, then re-evaluate symptoms.   Checklist: Progress Note Completed;Consent Reveiwed  Follow up comments: Patient stated her shoulder felt looser after treatment. Stated she had \"more feeling\" in toes but unable to proivde post-scores.    \"Risks and benefits of acupuncture were discussed with patient. Consent for treatment was given. We thank you for the referral.\"     Therese Alexander L.Ac.    Date:  2018  Time:  11:20 AM    "

## 2021-06-18 NOTE — PROGRESS NOTES
[facility] Hematology and Oncology Progress Note    Patient: Linda Canada  MRN: [id number]  Date of Service: 5/25/2018        Reason for Visit    Follow-up breast cancer on adjuvant chemotherapy.    Assessment and Plan  Malignant neoplasm of upper-outer quadrant of left female breast    Staging form: Breast, AJCC 7th Edition    - Clinical: No stage assigned - Unsigned    - Pathologic stage from 6/14/2017: Stage IIIA (T2, N2a, cM0) - Signed by Joie Ferro MD on 6/14/2017          ER Status: Positive          HI Status: Positive          HER2 Status: Positive    ECOG Performance   ECOG Performance Status: 2     Distress Assessment  Distress Assessment Score: No distress    Pain  Currently in Pain: Yes  Pain Score (Initial OR Reassessment): 8  Location: Lt arm      # Stage IIIA (pT2, pN2a, M0) invasive ductal carcinoma of the left breast- grade 3, ER (high positive), HI (positive) and HER2 (positive) with associated high-grade DCIS.  Final margins for invasive carcinoma and DCIS are positive.  S/p left breast lumpectomy, left axillary sentinel lymph node biopsy and a left axillary lymph node dissection on 5/31/17. S/p adjuvant chemotherapy TCHP x 6 cycles, followed by adjuvant radiation.  She is currently on adjuvant endocrine therapy with anastrozole and finishing one year of Herceptin.   -She is feeling okay.  Her labs were adequate.  She will complete her last dose of Herceptin today.  After that, she wants to have her port removed.     -We decided not to complete the last MUGA scan.  She does not have any chest pain, shortness of breath or any cardiac symptoms.   -She tolerates anastrozole well with expected side effects of hot flashes, joint aches.  She agreeable to continue.  She will get 90 day supplies.   -First bilateral diagnostic mammogram to complete.   -She is going to [place] in July and plan to return in September.   -Follow-up with me in September 2018.        #.  Low bone density   DEXA scan from  3/16/2018 showed L1-L3 T score-2.2, left femoral neck T score-1.9, right femoral neck T score-1.7 consistent with low bone density and moderate fracture risk.   -I encouraged to stay on vitamin D and calcium, weightbearing exercises.   -Plan to repeat a DEXA scan in 1 year.  If there is more decline in bone density, we will plan to start on bisphosphonate therapy.     #.  Right-sided chest wall/left inner arm pain.   She has some evidence of lymphedema in the left breast and left arm.  She continues to do with physical therapy.     #.  Left upper extremity restricted range of motion.   -She is doing physical exercises and follow with Dr. Domingo.  Now, she is going to see an orthopedics.     #. Depression/anxiety- chronic with recent exacerbation due to recent diagnosis of breast cancer.   -Feeling overall stable.    #.  Survivorship package review and given today.    Problem List    1. Malignant neoplasm of upper-outer quadrant of left breast in female, estrogen receptor positive  Ambulatory referral to General Surgery    DISCONTINUED: sodium chloride 0.9% 250 mL infusion    DISCONTINUED: trastuzumab 420 mg in sodium chloride 0.9% 250 mL chemo (HERCEPTIN)    DISCONTINUED: sodium chloride flush 20 mL (NS)    DISCONTINUED: heparin 100 unit/mL lockflush (PF) porcine 300-600 Units    DISCONTINUED: diphenhydrAMINE injection 50 mg (BENADRYL)    DISCONTINUED: famotidine 20 mg/2 mL injection 20 mg (PEPCID)    DISCONTINUED: hydrocortisone sod succ (PF) 100 mg/2 mL injection 100 mg    DISCONTINUED: acetaminophen tablet 1,000 mg (TYLENOL)      ______________________________________________________________________________    Diagnosis  5/31/17  Stage IIIA (pT2, pN2a, M0) invasive ductal carcinoma of the left breast   - ER (high positive, 80-90%), MA (high positive, 90%) HER2 (+ by FISH by avg HER2 signals 5.3, HER2/CEP17 ratio 2.3)  - Matt[place]oni grade 3  - Tumor size: 26 mm  - Margins positive and posterior margin for  invasive component and positive for posterior margin for DCIS.  - Angiolymphatic invasion present.  - 5/12 left axillary lymph node positive for carcinoma  - associated DCIS, grade 2, solid and cribriform with focal comedonecrosis.  20%,      PET scan did not show distant metastases.      Therapy to date:  5/31/17 - left partial mastectomy and Left axillary lymph node dissection  6/23/17- 10/6/17-C#1-6 TCHP; added neulasta support to TCHP starting C#2.  Herceptin to complete one year on 5/25/18.  1/22/2018-completed adjuvant radiation to the left breast 6040 cGy/33 fractions  3/2/2018-initiated adjuvant endocrine therapy with anastrozole.    Cardiac MUGA  6/16/17- LVEF 59.4%  9/15/17- LVEF 59%   12/5/17-LVEF 56.57%  3/1/18- LVEF 63.53%    History of Present Illness  She presented herself today.      She is here for cycle 17 for Herceptin.  She has been dealing with frozen left shoulder with associated pain.  Physical therapy was no longer helpful for her.  She is going to see an orthopedics but she got an appointment a month from now.  She reported her neuropathy in both feet are getting worse.  She does not want to try with any medication however she is interested in acupuncture.  She would like to have a port removed after this treatment.  She is planning a trip to [place] on 7/18/2018 and plan to spend time over there about 2 months.      Pain Status  Currently in Pain: Yes    Review of Systems    Constitutional  Constitutional (WDL): Exceptions to WDL  Fatigue: Fatigue not relieved by rest - Limiting instrumental ADL  Neurosensory  Neurosensory (WDL): Exceptions to WDL  Ataxia: Moderate symptoms, limiting instrumental ADL (Unsteady. Needing to use cane. )  Peripheral Sensory Neuropathy: Moderate symptoms, limiting instrumental ADL (Feet.)  Eye   Eye Disorder (WDL): All eye disorder elements are within defined limits  Ear  Ear Disorder (WDL): All ear disorder elements are within defined  limits  Cardiovascular  Cardiovascular (WDL): All cardiovascular elements are within defined limits  Pulmonary  Respiratory (WDL): Exceptions to WDL  Dyspnea: Shortness of breath with moderate exertion  Gastrointestinal  Gastrointestinal (WDL): Exceptions to WDL  Anorexia: Loss of appetite without alteration in eating habits  Nausea: Loss of appetite without alteration in eating habits  Dry Mouth: Symptomatic (e.g., dry or thick saliva) without significant dietary alteration, unstimulated saliva flow >0.2 ml/min  Genitourinary  Genitourinary (WDL): All genitourinary elements are within defined limits  Lymphatic  Lymph (WDL): All lymph disorder elements are within defined limits  Musculoskeletal and Connective Tissue  Arthralgia: Mild pain  Muscle Weakness : Symptomatic, perceived by patient but not evident on physical exam  Myalgia: Mild pain  Integumentary  Integumentary (WDL): All integumentary elements are within defined limits  Patient Coping  Patient Coping: Accepting  Distress Assessment  Distress Assessment Score: No distress  Accompanied by  Accompanied by: Alone  Oral Chemo Adherence       Past History  Past Medical History:   Diagnosis Date     Anxiety      Breast cancer 05/31/2017    left breast     Neuropathy      Panic attack        Physical Exam    Recent Vitals 5/25/2018   Height -   Weight 153 lbs 13 oz   BSA (m2) -   /75   Pulse 70   Temp 97.9   Temp src 1   SpO2 99   Some recent data might be hidden     General: alert, awake, not in acute distress  HEENT: Head: Normal, normocephalic, atraumatic.  Eye: Normal external eye, conjunctiva, lids cornea, BALA.  Ears: Non-tender.  Nose: Normal external nose, mucus membranes and septum.  Pharynx: Dental Hygiene adequate. Normal buccal mucosa.  Normal pharynx.  Neck / Thyroid: Supple, no masses, nodes, nodules or enlargement.  Lymphatics: No abnormally enlarged lymph nodes.  Chest: Normal chest wall and respirations. Clear to auscultation.  Breast:  Left breast showed well-healed scar with some skin erythema as expected.  Limited range of motion in the left shoulder.  No palpable adenopathy no masses.  Heart: S1 S2 RRR, no murmur.   Abdomen: abdomen is soft without significant tenderness, masses, organomegaly or guarding  Extremities: normal strength, tone, and muscle mass  Skin: normal.  No rash.  CNS: non focal.      Lab Results    Recent Results (from the past 168 hour(s))   Comprehensive Metabolic Panel (add-ons/treatment plan)   Result Value Ref Range    Sodium 143 136 - 145 mmol/L    Potassium 4.2 3.5 - 5.0 mmol/L    Chloride 107 98 - 107 mmol/L    CO2 27 22 - 31 mmol/L    Anion Gap, Calculation 9 5 - 18 mmol/L    Glucose 108 70 - 125 mg/dL    BUN 18 8 - 22 mg/dL    Creatinine 0.76 0.60 - 1.10 mg/dL    GFR MDRD Af Amer >60 >60 mL/min/1.73m2    GFR MDRD Non Af Amer >60 >60 mL/min/1.73m2    Bilirubin, Total 0.3 0.0 - 1.0 mg/dL    Calcium 9.6 8.5 - 10.5 mg/dL    Protein, Total 7.2 6.0 - 8.0 g/dL    Albumin 3.6 3.5 - 5.0 g/dL    Alkaline Phosphatase 83 45 - 120 U/L    AST 14 0 - 40 U/L    ALT 13 0 - 45 U/L   HM1 (CBC with Diff)   Result Value Ref Range    WBC 5.7 4.0 - 11.0 thou/uL    RBC 3.66 (L) 3.80 - 5.40 mill/uL    Hemoglobin 11.0 (L) 12.0 - 16.0 g/dL    Hematocrit 32.8 (L) 35.0 - 47.0 %    MCV 90 80 - 100 fL    MCH 30.1 27.0 - 34.0 pg    MCHC 33.5 32.0 - 36.0 g/dL    RDW 12.7 11.0 - 14.5 %    Platelets 194 140 - 440 thou/uL    MPV 10.1 8.5 - 12.5 fL    Neutrophils % 73 (H) 50 - 70 %    Lymphocytes % 18 (L) 20 - 40 %    Monocytes % 7 2 - 10 %    Eosinophils % 2 0 - 6 %    Basophils % 0 0 - 2 %    Neutrophils Absolute 4.1 2.0 - 7.7 thou/uL    Lymphocytes Absolute 1.1 0.8 - 4.4 thou/uL    Monocytes Absolute 0.4 0.0 - 0.9 thou/uL    Eosinophils Absolute 0.1 0.0 - 0.4 thou/uL    Basophils Absolute 0.0 0.0 - 0.2 thou/uL   Magnesium (add-ons/treatment plan)   Result Value Ref Range    Magnesium 2.0 1.8 - 2.6 mg/dL       Imaging    No results found.      Signed  by: Joie Ferro, MDA

## 2021-06-18 NOTE — PROGRESS NOTES
Linda came to chemo infusion following lab and MD visits for her next dose of Trastuzumab.  Port maintained good blood return.  Herceptin infused over 30 minutes and was well tolerated while in clinic.  Port was flushed with ns, heparinized then deaccessed and site covered.  Linda d/c from clinic ambulatory using her cane.  She is aware of her future appointment.

## 2021-06-18 NOTE — PROGRESS NOTES
"[facility] Daily Progress -Discharge    Patient Name: Linda Canada  Date: 5/16/2018  Visit #: 12  PTA visit #:  1    Date of evaluation: 3/26/2018  Referral Diagnosis: left rotator cuff injury with pain and decreased rom  Referring provider: Margaret Domingo  Visit Diagnosis:     ICD-10-CM    1. Injury of left rotator cuff, subsequent encounter S46.002D    2. Contracture, left shoulder M24.512    3. Scar condition and fibrosis of skin L90.5    4. Post-mastectomy lymphedema syndrome I97.2    5. Drug-induced polyneuropathy G62.0          Assessment:     Patient had appoinement with Dr Domingo and was referred to ortho.  Left shoulder still painful with active ROM, shoulder flexion and abduction have improved.  Dynamic balance and lower extremities strength improved.  Right Upper Extremity Total Estimated Volume (cm3): 2056.84  Left Upper Extremity Total Estimated Volume (cm3): 2768.81  Upper Extremity Swelling Comparison (%): 25.71 %   SPADI; [id number]%, at discharge 85%  FACIT: initial 9, at discharge 19.        Goal Status:  Pt. will demonstrate/verbalize independence in self-management of condition in : 4 weeks;Progressing toward  Pt. will have improved quality of sleep: waking less times/night;in 4 weeks;Progressing toward  Patient will reach / maintain arm movement: forward;overhead;behind;for home chores;for dressing;with full ROM;with less pain;in 4 weeks;Progressing toward  Comment: decrease fatigue      Plan / Patient Education:     Discharge to physical therapy , patient has an appoinment to consult with ortho for her left shoulder.    Subjective:     Pain Rating: 3-2  \"the  left shoulder is hurting, I can't sleep well\".        Objective:     L shoulder ROM:  Flexion: 102 degrees   Abduction: 75o degrees  Extension: 34o  ER: 40 degrees  IR: thumb to sacrum  Left medial deltoid and scapular tenderness.    Left hand tingling.    Treatment Today     TREATMENT MINUTES COMMENTS   Evaluation   "   Self-care/ Home management     Manual therapy 15 MFR/STM to L upper traps, L pec major, L scalene and SCM L cervical paraspinals  PROM L shoulder, left arm pull  on right side lying.  Strumming and tissue bending to pectoralis mayor and left upper trap.  Left scapular mob.   Neuromuscular Re-education     Therapeutic Activity     Therapeutic Exercises 30 Exercises:  Exercise #1: pendulum, wand  Comment #1: shoulder rolls, cervical lateral stretch  Exercise #2: nu step: seat 6, arms 10, level 2, with limited reciprocal arm motion x 14 minutes, left shoulder pain used one arm right  Comment #2: pulleys: flexion 102o , abduction 75o  Exercise #3: bilateral shoulder extension with orange T band  Comment #3: holding unto rail at parallel bars: marching, toes up/heels up, hip abduction, adduction, extension x 10 repetitions  Exercise #4: UBE: level 1 x  13 minutes  Comment #4: balance activities: eyes open/close, reaching forward/side/back with moderate difficulty, one leg standing eyes open/close  Exercise #5: reach to roll.       Gait training     Modality______warm packs____________ 15 Left scapular/shoulder with 4 layers of towels, checked skin every 5 min, good skin tolerance, skin was intact.              Total 60    Blank areas are intentional and mean the treatment did not include these items.       Chitra Westfall,JOSET-ROBERT  5/16/2018

## 2021-06-18 NOTE — PROGRESS NOTES
"ACUPUNCTURIST TREATMENT NOTE    Name: Linda Canada  :  1955  MRN:  [id number]      Acupuncture Treatment  Patient Type: JN Pain  Intervention Reason: Pain;Neuropathy;Neuropathy 2;Wellness  Pre-session Pain Ratin  Post-session Pain Ratin  Pre-session Neuropathy ratin  Pre-session Neuropathy 2 ratin  Patient complaint:: Patient reports neuropathy in feet is 9/10, hands 8/10 and left shoulder pain 9/10. Heat will help shoulder pain temporarily. Patient is going to orthopedic doctor this week to evaluate shoulder.  Acupuncture (Points):: Liv3, Bafeng, Sp3. Ki3, Sp6, Sp9, Gb34, St36, Li11, Tb5, Li4, Si3, Baxie, Baihui, Du24, Yintang, Gb20. Left side: Si10, Tb14, Li15, Li14, Jianqian  TCM Pulse: wiry  TCM Diagnosis: Mackenzie/Ki/Ht yin henry, liver blood henry  Practitioner Observed: 30 minute treatment. Heat lamp over left shoulder. Tuina with posumon oil to left shoulder and upper back/neck.  Checklist: Progress Note Completed;Consent Reveiwed  Follow up comments: Patient stated she had more feeling in hands and feet post-treatment, but unable to provide rating.    \"Risks and benefits of acupuncture were discussed with patient. Consent for treatment was given. We thank you for the referral.\"     Therese Alexander L.Ac.  Date:  2018  Time:  12:27 PM    "

## 2021-06-20 NOTE — PROGRESS NOTES
[facility] Radiation Oncology Follow Up Note    Patient: Linda Canada  MRN: [id number]  Date of Service: 09/21/2018    Assessment / Impression     1. Malignant neoplasm of upper-outer quadrant of left breast in female, estrogen receptor positive (H)        Malignant neoplasm of upper-outer quadrant of left female breast (H)    Staging form: Breast, AJCC 7th Edition    - Clinical: No stage assigned - Unsigned    - Pathologic stage from 6/14/2017: Stage IIIA (T2, N2a, cM0) - Signed by Joie Ferro MD on 6/14/2017          ER Status: Positive          IA Status: Positive          HER2 Status: Positive  ECOG Peformance Status  ECOG Performance Status: 2  Distress Assessment Score  Distress Assessment Score: 5  Interventions  Distress Assessment Intervention: Provider Notified  Body site: Breast     Plan:   Has not been doing ROM exercises and her arm looks very similar to when I saw her in February. Did not follow up with orthopedics after her MRI. Unable to see results of MRI.  I told her to  a copy of the results and bring to her appointment with Dr Domingo.   Keep upcoming appointments to address ROM of left arm and patient voices understanding.     Mammogram: BI-RADS Category 2: Benign  Return to myself PRN. All questions and concerns addressed.    Face to face time  25 minutes with > 75% spent on consultation, education and coordination of care.    Subjective:      HPI: Linda Canada is a 63 y.o. female who was treated with 3D conformal radiation therapy for left breast cancer that was picked up by the patient in April.  She then had a mammogram and ultrasound done.  Pathology from needle biopsy on 5/15/2017 showed ER/IA+, HER-2 positive invasive ductal carcinoma.           Dr. Kwong performed left lumpectomy and axillary lymph node dissection 5/31/2017.  The tumor was 2.6cm and the deep margin was positive.  5/12 lymph nodes were positive.  Re-excision was debated due to positive margins.   Post-operative course uneventful.  Dr. Kwong ordered an MRI 10/19/2017 which did not show any abnormalities.           6 cycles of TCHP competed 10/6/2017 and maintenance Herceptin started 10/27/2017.  Seen by Dr. Pollock for lymphedema and ROM exercises.         SITE TREATED: Left Breast              TOTAL DOSE: 6040 cGy  NUMBER OF FRACTIONS: 33  DATES COMPLETED: 1/22/2018  CONCURRENT CHEMOTHERAPY: No  ADJUVANT THERAPY:Yes: Herceptin      Linda tolerated the treatment without unexpected side effects.  She did struggle throughout the course of treatment with ongoing anxiety and depression which and has been followed by her psychologist.       She presents for routine office visit. Still have limited ROM of left arm. Otherwise doing well. Otherwise recovered from cutaneous effects of radiation.     Current Outpatient Prescriptions   Medication Sig Dispense Refill     anastrozole (ARIMIDEX) 1 mg tablet Take 1 tablet (1 mg total) by mouth daily. 30 tablet 2     calcium-vitamin D 500 mg(1,250mg) -200 unit per tablet Take 1 tablet by mouth 2 (two) times a day with meals.  0     docusate sodium (COLACE) 100 MG capsule Take 100 mg by mouth at bedtime as needed for constipation.       FLUoxetine (PROZAC) 20 MG capsule Take 40 mg by mouth daily.        hydrOXYzine (VISTARIL) 50 MG capsule Take 50 mg by mouth 3 (three) times a day as needed for itching.       LORazepam (ATIVAN) 0.5 MG tablet Take 0.5 mg by mouth daily as needed.        naproxen (NAPROSYN) 375 MG tablet        omeprazole (PRILOSEC) 20 MG capsule TAKE 1 CAPSULE(20 MG) BY MOUTH DAILY BEFORE BREAKFAST 30 capsule 0     QUEtiapine (SEROQUEL) 50 MG tablet        ranitidine (ZANTAC) 150 MG tablet Take 150 mg by mouth. prn       traZODone (DESYREL) 100 MG tablet        No current facility-administered medications for this visit.        The following portions of the patient's history were reviewed and updated as appropriate: allergies, current medications, past family  history, past medical history, past social history, past surgical history and problem list.    Review of Systems    General  Constitutional (WDL): Exceptions to WDL  Fatigue: Fatigue not relieved by rest - Limiting instrumental ADL  Hot flashes/Night Sweats: Mild symptoms, no intervention needed  EENT  Eye Disorder (WDL): All eye disorder elements are within defined limits  Ear Disorder (WDL): Exceptions to WDL  Tinnitus: Mild symptoms, intervention not indicated  Respiratory       Respiratory (WDL): Exceptions to WDL  Dyspnea: Shortness of breath with moderate exertion  Cardiovascular  Cardiovascular (WDL): All cardiovascular elements are within defined limits  Endocrine     Gastrointestinal  Gastrointestinal (WDL): All gastrointestinal elements are within defined limits  Musculoskeletal  Musculoskeletal and Connetive Tissue Disorders (WDL): Exceptions to WDL  Arthralgia: Mild pain  Muscle Weakness : Symptomatic, perceived by patient but not evident on physical exam  Myalgia: Mild pain  Integumentary               Integumentary (WDL): All integumentary elements are within defined limits  Neurological  Neurosensory (WDL): Exceptions to WDL  Ataxia: Moderate symptoms, limiting instrumental ADL (using cane)  Peripheral Sensory Neuropathy: Moderate symptoms, limiting instrumental ADL (feet)  Psychological/Emotional   Patient Coping: Accepting  Hematological/Lymphatic  Lymph (WDL): Exceptions to WDL  Lymphedema: Trace thickening or faint discoloration  Lymph Node Discomfort: Yes  Lymph Node Discomfort Location: left arm  Dermatologic     Genitourinary/Reproductive  Genitourinary (WDL): All genitourinary elements are within defined limits  Reproductive     Pain              Currently in Pain: Yes  Pain Score (Initial OR Reassessment): 7  Pain Frequency: Constant/continuous  Location: left arm  Pain Intervention(s): Home medication  Response to Interventions: a little  AUA Assessment                                   Accompanied by  Accompanied by: Alone      Objective:     Physical Exam    Vitals:    09/21/18 1451   BP: 131/75   Pulse: 74   Temp: 97.9  F (36.6  C)   TempSrc: Oral   SpO2: 100%   Weight: 158 lb 6.4 oz (71.8 kg)        Head: Normocephalic, without obvious abnormality, atraumatic  Neck: no adenopathy and supple, symmetrical, trachea midline  Lungs: clear to auscultation bilaterally  Breasts:  expected post operative changes, no masses skin changes suggestive of recurrence or infection.   Heart: regular rate and rhythm  Skin: Skin color, texture, turgor normal. No rashes or lesions  Lymph nodes: Cervical, supraclavicular, and axillary nodes normal.  Extremities: limited ROM, no change from last time I saw her.    Neurologic: Grossly normal  Psych: affect normal, thought content appropriate.       Recent Labs: No results found for this or any previous visit (from the past 168 hour(s)).    Imaging: Imaging results 30 days: No results found.    I, Betty Pinzon MD personally performed the services described in this documentation, as scribed by Duong Noland in my presence, and it is both accurate and complete.    Signed by: Betty Pinzon MD, MPH

## 2021-06-20 NOTE — PROGRESS NOTES
Chief Complaint   Patient presents with     Follow-up     patien states she is a little unclear as of the nature of the visit today, follow up from lumpectomy

## 2021-06-20 NOTE — PROGRESS NOTES
"Date Of Service: 09/27/18    Date last Seen:  05/10/18    PCP: Toña Barrera PA-C    Impression:   1.  Left arm swelling and left arm/shoulder tightness-stable  2.  Secondary post mastectomy lymphedema-stable  3.  Left shoulder contracture with cording  4.  Left shoulder pain- rotator cuff injury  5.  Breast carcinoma (5/2017 left lumpectomy with LND, chemo/rad)     Plan:        1.  Questions were answered.  2.  We were able to get a hold of the neck and cervical MRI.  There is a mod to high grade bursal tear with supraspinatus involvement and capsulitis.  With this and lack of response to therapy I will send her back to Dr. Cleary.  She is losing ROM in the shoulder and the pain is worsening.  This is further impairing sleep and her overall function and doing more damage to the lymphatic system.  Hopefully, he will be able to help.    3.   Follow up in 4 months or when needed.    Time spent with patient 25 minutes, with greater than 50% time in consultation, education and coordination of care.  _____________________________________________________________________    Chief Complaint:  left arm swelling and left arm/shoulder tightness     History of Present Illness:  Linda Canada returns to the [facility] for her left arm swelling and left arm/shoulder tightness and numbness due to post mastectomy lymphedema with cording in the left axilla after being diagnosed with left breast carcinoma in May of 2017 specifically \"Stage IIIA (pT2, pN2a, M0) invasive ductal carcinoma of the left breast- grade 3, ER (high positive), NC (positive) and HER2 (positive) with associated high-grade DCIS.  Final margins for invasive carcinoma and DCIS were positive.   Left breast lumpectomy, left axillary sentinel lymph node biopsy and a left axillary lymph node dissection were done on 5/31/17.  She had chemotherapy and radiation.  She has some peripheral neuropathic changes from the chemotherapy.  " She does her exercises and has the compression sleeve. She was experiencing increased pain in the left shoulder.  This decreased her function with the arm.   I sent her to therapy with minimal benefit.  She was then sent to orthopedics for evaluation for possible rotator cuff injury.  She saw Dr. Cleary.  Cervical MRI shoed some mile degenerative changes, but the left shoulder MRI showed rotator cuff injury.  There is no new unexplained weight loss, loss of appetite, nausea and/or vomiting, shortness of breath, weight loss and chest pain.  She continues to see her psychologist.      Past Medical History:   Diagnosis Date     Anxiety      Breast cancer (H) 05/31/2017    left breast     Depression      Neuropathy (H)      Panic attack        Past Surgical History:   Procedure Laterality Date     BREAST LUMPECTOMY Left     May 2017     BREAST SURGERY Left 05/31/2017    left lumpectomy     TX [facility] VAD W/SUBQ PORT/ CTR/PRPH INSJ N/A 6/6/2018    Procedure: PORT REMOVAL ;  Surgeon: Naheed Kwong MD;  Location: [facility];  Service: General         Current Outpatient Prescriptions:      anastrozole (ARIMIDEX) 1 mg tablet, Take 1 tablet (1 mg total) by mouth daily., Disp: 30 tablet, Rfl: 2     calcium-vitamin D 500 mg(1,250mg) -200 unit per tablet, Take 1 tablet by mouth 2 (two) times a day with meals., Disp: , Rfl: 0     LORazepam (ATIVAN) 0.5 MG tablet, Take 0.5 mg by mouth daily as needed. , Disp: , Rfl:      omeprazole (PRILOSEC) 20 MG capsule, TAKE 1 CAPSULE(20 MG) BY MOUTH DAILY BEFORE BREAKFAST, Disp: 30 capsule, Rfl: 0     QUEtiapine (SEROQUEL) 50 MG tablet, , Disp: , Rfl:      ranitidine (ZANTAC) 150 MG tablet, Take 150 mg by mouth. prn, Disp: , Rfl:      docusate sodium (COLACE) 100 MG capsule, Take 100 mg by mouth at bedtime as needed for constipation., Disp: , Rfl:      FLUoxetine (PROZAC) 20 MG capsule, Take 40 mg by mouth daily. , Disp: , Rfl:      hydrOXYzine (VISTARIL) 50 MG capsule, Take 50  mg by mouth 3 (three) times a day as needed for itching., Disp: , Rfl:      naproxen (NAPROSYN) 375 MG tablet, , Disp: , Rfl:     Allergies   Allergen Reactions     Penicillins Rash       Social History     Social History     Marital status: Single     Spouse name: Gisella     Number of children: 0     Years of education: N/A     Occupational History     NA      Partner works      Social History Main Topics     Smoking status: Former Smoker     Packs/day: 1.50     Years: 12.00     Types: Cigarettes     Quit date: 6/16/2002     Smokeless tobacco: Never Used     Alcohol use No      Comment: states has not drank in a couple months     Drug use: No     Sexual activity: No     Other Topics Concern     Not on file     Social History Narrative    Female partner. .  On disability and trained as dental assistant.         Family History   Problem Relation Age of Onset     No Medical Problems Mother      Lung cancer Father 78     Diabetes Sister      Skin cancer Sister      Hypertension Sister        Review of Systems:  Review of systems is otherwise negative, except as noted above.  Full 12 point review of systems was completed.    Imaging:    I personally reviewed the following imaging today and those on care everywhere, if indicated    [facility]     DATE: 3/2/2018 11:49 AM     COMPARISON: 07/14/2017     CLINICAL HISTORY: Other chest pain     TECHNIQUE: Frontal and lateral radiographs of the chest are provided.     FINDINGS: The right subclavian chest port is unchanged from the prior examination. A few surgical clips are seen within the left axillary region. The lungs are clear. There is no pleural effusion or pneumothorax. The cardiomediastinal silhouette is   normal. Limited visualized portions of the upper abdomen are grossly normal.     IMPRESSION:   1.  Lungs are clear.    6/28/2018 left shoulder MRI  Conclusion  1.  Moderate to high-grade bursal sided tear involving supraspinatus footprint fibers.   No retraction.  2.  Mild subacromial/subdeltoid bursitis, extending into the subcoracoid bursa.  3.  Soft tissue thickening and edema within the rotator cuff interval, in keeping with focal capsulitis.  4.  Small acromial spur posteriorly.  Enthesopathy at the coracoacromial attachment site.        Labs:    I personally reviewed the following labs today and those on care everywhere, if indicated    No results found for: SEDRATE      No results found for: CRP        Lab Results   Component Value Date    CREATININE 0.76 05/25/2018      No results found for: HGBA1C        Lab Results   Component Value Date    BUN 18 05/25/2018              Lab Results   Component Value Date    ALBUMIN 3.6 05/25/2018       No results found for: BKBCLQEX76MM    No results found for: TSH  No components found for: KVBO2EX      Physical Exam:  Vitals:    09/27/18 1154   BP: 108/60   Pulse: 64   Resp: 20   Temp: 98.4  F (36.9  C)      BMI 28.72 (increased)    Circumferential measures:    Vasc Edema 9/25/2017 12/28/2017 3/8/2018 5/10/2018 9/27/2018   Right-just above MCP 14.5 18 17.2 17.5 18.5   Right Wrist 14.7 15.3 15 14.5 15.2   Right Up 10cm 21.0 18.8 18.5 18 20   Right Up 10cm From Elbow 29.0 27.8 28.5 28.5 30.2   Left-just above MCP 17.8 18.3 17.5 18 18   Left Wrist 15.0 15.1 14.7 15 15   Left Up 10cm 21.0 19.3 18.2 18 22   Left Up 10cm From Elbow 30.5 29.3 29 31 32.5     Fairly stable    General:  63 y.o. female in no apparent distress.  Alert and oriented x 3.  Cooperative. Affect normal.    Range of Motion: Range of motion of the neck is full and Spurling's maneuver is negative bilaterally.  Range of motion of elbows, wrists is normal bilaterally without active joint synovitis, erythema, joint swelling, crepitus or joint laxity.   Right shoulder range of motion is full but left is now decreased to 80 degrees of  forward flexion and abduction with significant pain.  There is pain on palpation of the left anterior shoulder and also in  the area of the AC joint.   There continues to be cording on the left anterior axilla.       Strength:  Normal strength testing in  elbow flexion, elbow extension, wrist extension, forearm supination, forearm pronation and hand intrinsics bilaterally.       Lymph nodes: No cervical, supraclavicular, infraclavicular, or axillary lymphadenopathy palpated.     Vascular: No unusual venous distention.  Radial arterial pulses strong and equal at the wrists bilaterally.      Skin: No unusual rubor, calor, masses or rashes along the skin in either arm.  No significant fibrosity or scarring.  No pain to palpation.     Margaret Domingo MD, ABWMS, FACCWS, [facility]  Medical Director Wound Care and Lymphedema  [facility]  [phone]

## 2021-06-20 NOTE — PROGRESS NOTES
This is a 63 y.o. woman who comes in for  continued follow-up of her left breast cancer.  She is now 1 1/2 years  status post left partial mastectomy with radiation.  She is complaining of pain in her left shoulder.  Has limited range of motion.  She did have an MRI done but the report is not in the chart.  She is being followed by Dr. Earl Cleary for this.    Please see the chart review for PMH, Meds, allergies, FH and SH.    ROS:  A 12 point comprehensive review of systems was negative except as noted.      Physical Exam:  /80 (Patient Site: Left Arm, Patient Position: Sitting, Cuff Size: Adult Regular)  Pulse 73  Wt 156 lb (70.8 kg)  SpO2 99%  Breastfeeding? No  BMI 28.53 kg/m2  General appearance: alert, appears stated age and cooperative  Breasts: There are no palpable masses. There are moderate radiation changes. The scar has healed up well.  Lymph nodes: Cervical, supraclavicular, and axillary nodes normal.  There is not at all any significant contracture formation in the axilla.  Neurologic: Grossly normal    Data Review: Reviewed her current mammograms. No evidence of disease.      Impression: Personal History of breast cancer. NOD.  Is overall doing well from the standpoint of the surgery.  Is having a lot of problems with a frozen shoulder on the left.  She will be following up with her orthopedic surgeon regarding that.    Plan: Follow up with me next June after her mammogram.

## 2021-06-20 NOTE — PROGRESS NOTES
[facility] Hematology and Oncology Progress Note    Patient: Linda Canada  MRN: [id number]  Date of Service: 9/28/2018        Reason for Visit    Follow-up breast cancer on adjuvant chemotherapy.    Assessment and Plan  Malignant neoplasm of upper-outer quadrant of left female breast (H)    Staging form: Breast, AJCC 7th Edition    - Clinical: No stage assigned - Unsigned    - Pathologic stage from 6/14/2017: Stage IIIA (T2, N2a, cM0) - Signed by Joie Ferro MD on 6/14/2017          ER Status: Positive          TX Status: Positive          HER2 Status: Positive    ECOG Performance   ECOG Performance Status: 2     Distress Assessment  Distress Assessment Score: 5    Pain  Currently in Pain: Yes  Pain Score (Initial OR Reassessment): No/Denies Pain  Location: left arm, left shoulder with certain positions      #.  Stage IIIa (pT2, pN2a M0) invasive ductal carcinoma of the left breast, grade 3, ER positive, TX positive and HER-2 positive.  Post lumpectomy and left axillary lymph node dissection in April 2015, followed by adjuvant chemotherapy with TCH P and adjuvant radiation.  She is currently on adjuvant endocrine therapy which started in March 2018.     She is overall doing well on anastrozole without any significant side effects.  She is feeling okay to continue.  Her diagnostic mammogram in June 2018 was benign.   She would like to have 90-day supply for anastrozole which was declined by her insurance before and she would like me to attempt it again.   Follow-up with me in 6 months.    #.  Low bone density   DEXA scan from March 2018 showed L-spine T score of-2.2, left femoral neck T score of-1.9, right femoral neck T score of-1.7.   She is continuing calcium and vitamin D.  She is not able to do much of exercises at this point.   We will plan to repeat DEXA scan in March 2019.  If there is more decline in bone density, we will plan to start bisphosphonate therapy.    #.  Left shoulder adhesive capsulitis  which has been very limiting her left arm range of motion and activity.   He was seen by orthopedics and offered steroid injection which she is not interested at this point.  She would like to continue with physical therapy.  She will follow-up with orthopedic in the next few days.    #. Depression/anxiety- chronic with recent exacerbation due to recent diagnosis of breast cancer.   -Feeling overall stable.    #.  Declined flu shot.  She had reaction before and does not want to have a flu shot.      Problem List    1. Nevus  Ambulatory referral to Dermatology   2. Malignant neoplasm of upper-outer quadrant of left breast in female, estrogen receptor positive (H)     3. Low bone density  DXA Bone Density Scan    DXA Bone Density Scan   4. Aromatase inhibitor use  DXA Bone Density Scan    DXA Bone Density Scan      ______________________________________________________________________________    Diagnosis  5/31/17  Stage IIIA (pT2, pN2a, M0) invasive ductal carcinoma of the left breast   - ER (high positive, 80-90%), WA (high positive, 90%) HER2 (+ by FISH by avg HER2 signals 5.3, HER2/CEP17 ratio 2.3)  - Horacio grade 3  - Tumor size: 26 mm  - Margins positive and posterior margin for invasive component and positive for posterior margin for DCIS.  - Angiolymphatic invasion present.  - 5/12 left axillary lymph node positive for carcinoma  - associated DCIS, grade 2, solid and cribriform with focal comedonecrosis.  20%,      PET scan did not show distant metastases.      Therapy to date:  5/31/17 - left partial mastectomy and Left axillary lymph node dissection  6/23/17- 10/6/17-C#1-6 TCHP; added neulasta support to TCHP starting C#2.  Herceptin to complete one year on 5/25/18.  1/22/2018-completed adjuvant radiation to the left breast 6040 cGy/33 fractions  3/2/2018-initiated adjuvant endocrine therapy with anastrozole.    History of Present Illness  Linda is accompanied by her sister today.      She had a good trip  in [place] for a month and return home a couple weeks ago.  She has ongoing struggle with frozen shoulder on the left side.  She feels that her neuropathy has slightly improved or may be stable.  Denies breast concern.      Pain Status  Currently in Pain: Yes    Review of Systems    Constitutional  Constitutional (WDL): Exceptions to WDL  Fatigue: Fatigue relieved by rest (hard to get up in the morings)  Fever: None  Chills: Mild sensation of cold, shivering, chattering of teeth (gets cold easily)  Weight Gain: None  Weight Loss: None  Neurosensory  Neurosensory (WDL): Exceptions to WDL  Peripheral Motor Neuropathy: Moderate symptoms, limiting instrumental ADL (left arm, left hand left shoulder)  Ataxia: Asymptomatic, clinical or diagnostic observations only, intervention not indicated (uses cane)  Peripheral Sensory Neuropathy: Moderate symptoms, limiting instrumental ADL (bilat feet.  Feels like a piece of ice in shoes, Also N/T left hand)  Confusion: None  Syncope: None  Eye   Eye Disorder (WDL): Exceptions to WDL  Blurred Vision: Intervention not indicated  Dry Eye: None  Eye Pain: None  Watering Eyes: Intervention not indicated  Ear  Ear Disorder (WDL): Exceptions to WDL (decreased hearing)  Ear Pain: Mild Pain (intermittent ear pain)  Tinnitus: Mild symptoms, intervention not indicated (intermittent ringing)  Cardiovascular  Cardiovascular (WDL): Exceptions to WDL  Palpitations: Definition: A disorder characterized by inflammation of the muscle tissue of the heart. (with SOB, anxiety)  Edema: No  Phlebitis: None  Superficial thrombophlebitis: None  Pulmonary  Respiratory (WDL): Exceptions to WDL  Cough: None  Dyspnea: Shortness of breath with moderate exertion  Hypoxia: None  Gastrointestinal  Gastrointestinal (WDL): Exceptions to WDL  Anorexia: None  Constipation: None  Diarrhea: Increase of <4 stools per day over baseline, mild increase in ostomy output compared to baseline (due to eating papaya in the  mornings/2-3 loose stools/day)  Dysphagia: None  Esophagitis: Asymptomatic, clinical or diagnostic observations only, intervention not indicated  Nausea: Loss of appetite without alteration in eating habits (with dizziness)  Pharyngitis: None  Vomiting: None  Dysgeusia: None  Dry Mouth: None  Genitourinary  Genitourinary (WDL): Exceptions to WDL (intermittent burning with urination, not every time)  Urinary Frequency: None  Urinary Retention: None  Urinary Tract Pain: None  Lymphatic  Lymph (WDL): Exceptions to WDL  Lymph Node Discomfort: Yes  Lymph Node Discomfort Location: left axilla,  left breast  Musculoskeletal and Connective Tissue  Musculoskeletal and Connetive Tissue Disorders (WDL): Exceptions to WDL  Arthralgia: Mild pain (Left shoulder)  Bone Pain: None  Muscle Weakness : Symptomatic, perceived by patient but not evident on physical exam (left arm, left hand)  Myalgia: Mild pain (Left arm)  Integumentary  Integumentary (WDL): All integumentary elements are within defined limits  Patient Coping  Patient Coping: Open/discussion  Distress Assessment  Distress Assessment Score: 5  Accompanied by  Accompanied by: Family Member (Sister, Lizz)  Oral Chemo Adherence       Past History  Past Medical History:   Diagnosis Date     Anxiety      Breast cancer (H) 05/31/2017    left breast     Depression      Neuropathy (H)      Panic attack        Physical Exam    Recent Vitals 9/28/2018   Height -   Weight 156 lbs 2 oz   BSA (m2) -   /61   Pulse 72   Temp 98.9   Temp src 1   SpO2 98   Some recent data might be hidden     General: alert, awake, not in acute distress  HEENT: Head: Normal, normocephalic, atraumatic.  Eye: Normal external eye, conjunctiva, lids cornea, BALA.  Ears: Non-tender.  Nose: Normal external nose, mucus membranes and septum.  Pharynx: Dental Hygiene adequate. Normal buccal mucosa.  Normal pharynx.  Neck / Thyroid: Supple, no masses, nodes, nodules or enlargement.  Lymphatics: No  abnormally enlarged lymph nodes.  Chest: Normal chest wall and respirations. Clear to auscultation.  Breast: Left breast showed well-healed scar with some skin erythema as expected.  Limited range of motion in the left shoulder.  No palpable adenopathy no masses.  Heart: S1 S2 RRR, no murmur.   Abdomen: abdomen is soft without significant tenderness, masses, organomegaly or guarding  Extremities: normal strength, tone, and muscle mass  Skin: normal.  No rash.  CNS: non focal.    Lab Results    No results found for this or any previous visit (from the past 168 hour(s)).    Imaging    No results found.    TT: 40 minutes and more than 50% was spent on coordination and counseling of care.    Signed by: Joie Ferro MD

## 2021-06-23 NOTE — PROGRESS NOTES
S: I have received Linda's [employer]i [place] arm sleeves w/ attached gauntlets 20-30 mmHg (size 2, EW). Rx on file is current.    A: No assessment was made. I will be shipping the garments to her home address, along with proper compliance paperwork to be signed, dated, and returned.    P: She is to call with any further needs.  Goal is to maintain a home program.

## 2021-06-23 NOTE — PATIENT INSTRUCTIONS - HE
Please call [facility] to schedule an appointment. If you received a prescription please bring it with you to your appointment. You may call one of the locations below, although some locations are limited to what they carry.    Office Locations    [place]  [address]   3rd floor, [address]  [place] [address]   Phone: [phone]    [facility]  [address]  [place] [address]  Phone: [phone]  Fax: [phone]    [facility]   [address]  [place] [address]  Phone: [phone]  Fax: [phone]    [facility]  [address]  [place] [address]  Phone: [phone]  Fax: [phone]    [facility]  [address]  [address]  [place] [address]  Phone: [phone]  Fax: [phone]    [facility]  [address]  [address]   [place] [address]   Phone: [phone]  Fax: [phone]    [place]   [address] [place] [address]   Phone: [phone]  Fax: [phone]

## 2021-06-23 NOTE — PROGRESS NOTES
"Date Of Service: 01/31/19    Date last Seen:  09/27/18    PCP: Toña Barrera PA-C    Impression:   1.  Left arm swelling and left arm/shoulder tightness-stable  2.  Secondary post mastectomy lymphedema-stable  3.  Left shoulder contracture with cording and pain  4.  Left shoulder pain- rotator cuff injury, bursal tear with capsilitis  5.  Breast carcinoma (5/2017 left lumpectomy with LND, chemo/rad)     Plan:  1.       Questions were answered.  2. Recommended she get going back with her therapy to work on the shoulder.  We discussed the importance of range of motion and stretching.  The pain does not improve she will return to Dr. Cleary and consider injection and possible surgery.  She is also considering moving to [place] to be closer to her family.  3. Continue her exercises and wearing compression.  New upper extremity compression garment was written for her.  4. Follow up in 6 months or when needed.    Time spent with patient 25 minutes, with greater than 50% time in consultation, education and coordination of care.  _____________________________________________________________________    Chief Complaint:  left arm swelling and left arm/shoulder tightness     History of Present Illness:  Linda Canada [facility] for her left arm swelling and left arm/shoulder tightness and numbness due to post mastectomy lymphedema with cording in the left axilla after being diagnosed with left breast carcinoma in May of 2017 specifically \"Stage IIIA (pT2, pN2a, M0) invasive ductal carcinoma of the left breast- grade 3, ER (high positive), IA (positive) and HER2 (positive) with associated high-grade DCIS.  Final margins for invasive carcinoma and DCIS were positive.   Left breast lumpectomy, left axillary sentinel lymph node biopsy and a left axillary lymph node dissection were done on 5/31/17.  She had chemotherapy and radiation with some peripheral neuropathic changes from " the chemotherapy.  She does her exercises and has the compression sleeve. She was experiencing increased pain in the left shoulder.  This decreased her function with the arm.   I sent her to therapy with minimal benefit.  She was then sent to orthopedics Dr. Cleary for evaluation for possible rotator cuff injury.  Left shoulder MRI showed rotator cuff injury.  She started therapy again but then went to visit her family in [place].  She still has to go back to get most of his therapy done as prescribed by Dr. Cleary for the left shoulder.  Feels the swelling is under good control.  She is looking to move to [place] to be closer to her family.  There is no new unexplained weight loss, loss of appetite, nausea and/or vomiting, shortness of breath, weight loss and chest pain.  She continues to see her psychologist.      Past Medical History:   Diagnosis Date     Anxiety      Breast cancer (H) 05/31/2017    left breast     Depression      Neuropathy (H)      Panic attack        Past Surgical History:   Procedure Laterality Date     BREAST LUMPECTOMY Left     May 2017     BREAST SURGERY Left 05/31/2017    left lumpectomy     ND [facility] VAD W/SUBQ PORT/ CTR/PRPH INSJ N/A 6/6/2018    Procedure: PORT REMOVAL ;  Surgeon: Naheed Kwong MD;  Location: [facility];  Service: General         Current Outpatient Medications:      anastrozole (ARIMIDEX) 1 mg tablet, Take 1 tablet (1 mg total) by mouth daily., Disp: 30 tablet, Rfl: 2     calcium-vitamin D 500 mg(1,250mg) -200 unit per tablet, Take 1 tablet by mouth 2 (two) times a day with meals., Disp: , Rfl: 0     docusate sodium (COLACE) 100 MG capsule, Take 100 mg by mouth at bedtime as needed for constipation., Disp: , Rfl:      FLUoxetine (PROZAC) 20 MG capsule, Take 40 mg by mouth daily. , Disp: , Rfl:      hydrOXYzine (VISTARIL) 50 MG capsule, Take 50 mg by mouth 3 (three) times a day as needed for itching., Disp: , Rfl:      LORazepam (ATIVAN) 0.5 MG  tablet, Take 0.5 mg by mouth daily as needed. , Disp: , Rfl:      LORazepam (ATIVAN) 1 MG tablet, , Disp: , Rfl:      naproxen (NAPROSYN) 375 MG tablet, Take 375 mg by mouth as needed. , Disp: , Rfl:      omeprazole (PRILOSEC) 20 MG capsule, TAKE 1 CAPSULE(20 MG) BY MOUTH DAILY BEFORE BREAKFAST, Disp: 30 capsule, Rfl: 2     QUEtiapine (SEROQUEL) 50 MG tablet, Take 50 mg by mouth at bedtime. , Disp: , Rfl:      ranitidine (ZANTAC) 150 MG tablet, Take 150 mg by mouth. prn, Disp: , Rfl:      traZODone (DESYREL) 50 MG tablet, , Disp: , Rfl:     Allergies   Allergen Reactions     Penicillins Rash       Social History     Socioeconomic History     Marital status: Single     Spouse name: Gisella     Number of children: 0     Years of education: Not on file     Highest education level: Not on file   Social Needs     Financial resource strain: Not on file     Food insecurity - worry: Not on file     Food insecurity - inability: Not on file     Transportation needs - medical: Not on file     Transportation needs - non-medical: Not on file   Occupational History     Occupation: NA     Comment: Partner works    Tobacco Use     Smoking status: Former Smoker     Packs/day: 1.50     Years: 12.00     Pack years: 18.00     Types: Cigarettes     Last attempt to quit: 2002     Years since quittin.6     Smokeless tobacco: Never Used   Substance and Sexual Activity     Alcohol use: No     Comment: states has not drank in a couple months     Drug use: No     Sexual activity: No   Other Topics Concern     Not on file   Social History Narrative    Female partner, recently .  On disability and trained as dental assistant.         Family History   Problem Relation Age of Onset     No Medical Problems Mother      Lung cancer Father 78     Diabetes Sister      Skin cancer Sister      Hypertension Sister        Review of Systems:  Review of systems is otherwise negative, except as noted above.  Full 12 point review of  systems was completed.    Imaging:    I personally reviewed the following imaging today and those on care everywhere, if indicated    [facility]     DATE: 3/2/2018 11:49 AM     COMPARISON: 07/14/2017     CLINICAL HISTORY: Other chest pain     TECHNIQUE: Frontal and lateral radiographs of the chest are provided.     FINDINGS: The right subclavian chest port is unchanged from the prior examination. A few surgical clips are seen within the left axillary region. The lungs are clear. There is no pleural effusion or pneumothorax. The cardiomediastinal silhouette is   normal. Limited visualized portions of the upper abdomen are grossly normal.     IMPRESSION:   1.  Lungs are clear.    6/28/2018 left shoulder MRI  Conclusion  1.  Moderate to high-grade bursal sided tear involving supraspinatus footprint fibers.  No retraction.  2.  Mild subacromial/subdeltoid bursitis, extending into the subcoracoid bursa.  3.  Soft tissue thickening and edema within the rotator cuff interval, in keeping with focal capsulitis.  4.  Small acromial spur posteriorly.  Enthesopathy at the coracoacromial attachment site.      Labs:    I personally reviewed the following labs today and those on care everywhere, if indicated    No results found for: SEDRATE      No results found for: CRP        Lab Results   Component Value Date    CREATININE 0.76 05/25/2018      No results found for: HGBA1C        Lab Results   Component Value Date    BUN 18 05/25/2018              Lab Results   Component Value Date    ALBUMIN 3.6 05/25/2018       No results found for: TQESOLYL30JG    No results found for: TSH  No components found for: [id number]      Physical Exam:  Vitals:    01/31/19 0957   BP: 128/74   Pulse: 64   Resp: 20   Temp: 98.5  F (36.9  C)      BMI 28.97 (stable)    Circumferential measures:    Vasc Edema 12/28/2017 3/8/2018 5/10/2018 9/27/2018 1/31/2019   Right-just above MCP 18 17.2 17.5 18.5 18   Right Wrist 15.3 15 14.5 15.2 14.7   Right Up  10cm 18.8 18.5 18 20 19.8   Right Up 10cm From Elbow 27.8 28.5 28.5 30.2 30.3   Left-just above MCP 18.3 17.5 18 18 18   Left Wrist 15.1 14.7 15 15 14.5   Left Up 10cm 19.3 18.2 18 22 21   Left Up 10cm From Elbow 29.3 29 31 32.5 32.8     Fairly stable    General:  64 y.o. female in no apparent distress.      Psych:  Alert and oriented x 3.  Cooperative. Affect normal.    HEENT:  Atraumatic and normocephalic.    Range of Motion:  Range of motion of elbows, wrists is normal bilaterally without active joint synovitis, erythema, joint swelling, crepitus or joint laxity.   Right shoulder range of motion is full with left is  to 85 degrees of forward flexion and abduction with significant pain.  Range has slightly improved on the left.  There is pain on palpation of the left anterior shoulder and also in the area of the AC joint.   There continues to be cording on the left anterior axilla.       Strength:  Normal strength testing in  elbow flexion, elbow extension, wrist extension, forearm supination, forearm pronation and hand intrinsics bilaterally.    Sensation: Intact pinprick and light touch along the upper extremities bilaterally.    Lymph nodes: No cervical, supraclavicular, infraclavicular, or axillary lymphadenopathy palpated.     Vascular: No unusual venous distention.  Radial arterial pulses strong and equal at the wrists bilaterally.      Skin: No unusual rubor, calor, masses or rashes along the skin in either arm.  No significant fibrosity or scarring.  No pain to palpation.     Margaret Domingo MD, ABWMS, FACCWS, [facility]  Medical Director Wound Care and Lymphedema  [facility]  [phone]

## 2021-06-23 NOTE — PROGRESS NOTES
S: Patient seen in [place] to be measured for a left arm sleeve with attached gauntlet. She has an Rx from Dr. Domingo to treat her left-sided lymphedema. She reports her swelling as excessive today. Linda's intention is to order the same gauntlet she has been wearing the past two years, an off-the-shelf arm sleeve w/attached gauntlet.    O: Goal is to evaluate and measure patient for a compression garment that will help control her lymphedema. Observations show there is swelling present from lymphatic fluid. The expected outcome with compliant use is to reduce swelling and control the patient's condition.     A: I took measurements for a [employer]i [place] Arm Sleeve w/ Attached Gauntlet 20-30 mmHg (size 2 EW). This garment will help to maintain the reduced volume and shape of the limb. It will also help to inhibit further fluid accumulation. Order submitted for fabrication to [employer] for two of the arm sleeves/attached gauntlets in black.    P: I will call patient when items arrive to ship to her home address which has been verified.

## 2021-06-27 ENCOUNTER — HEALTH MAINTENANCE LETTER (OUTPATIENT)
Age: 66
End: 2021-06-27

## 2021-07-03 NOTE — ADDENDUM NOTE
Addendum Note by Joie Ferro MD at 3/29/2019  8:15 AM     Author: Joie Ferro MD Service: -- Author Type: Physician    Filed: 3/29/2019 10:12 AM Encounter Date: 3/29/2019 Status: Signed    : Joie Ferro MD (Physician)    Addended by: JOIE FERRO on: 3/29/2019 10:12 AM        Modules accepted: Orders

## 2021-07-03 NOTE — ADDENDUM NOTE
Addendum Note by Radha Puentes CMA at 10/2/2018  4:01 PM     Author: Radha Puentes CMA Service: -- Author Type: Certified Medical Assistant    Filed: 10/2/2018  4:01 PM Date of Service: 10/2/2018  4:01 PM Status: Signed    : Radha Puentes CMA (Certified Medical Assistant)    Encounter addended by: Radha Puentes CMA on: 10/2/2018  4:01 PM<BR>     Actions taken: Sign clinical note, Charge Capture section accepted

## 2021-10-17 ENCOUNTER — HEALTH MAINTENANCE LETTER (OUTPATIENT)
Age: 66
End: 2021-10-17

## 2022-05-03 NOTE — PROGRESS NOTES
"Vascular/Wound Consultation-Swelling-UpperExtremity      I have been asked to see Linda Canada by Joie Ferro MD     Date of Service: 09/25/17    Chief Complaint:  left arm swelling and left arm/shoulder tightness    History of Present Illness:  This 62 y.o. female present to the [facility], as a new consult, with left arm swelling and left arm/shoulder tightness and numbness.  The patient's problems began in May 2017 when she was diagnosed with left breast carcinoma.  Specifically, per her oncologist Dr. Ferro she had \"Stage IIIA (pT2, pN2a, M0) invasive ductal carcinoma of the left breast- grade 3, ER (high positive), [place] (positive) and HER2 (positive) with associated high-grade DCIS.  Final margins for invasive carcinoma and DCIS are positive.  S/p left breast lumpectomy, left axillary sentinel lymph node biopsy and a left axillary lymph node dissection on 5/31/17.\"  She has one more session of chemotherapy and then will be getting radiation.  She will first be seeing Dr. Kwong again.  The patient began to notice swelling of the left arm about one month after surgery with increasing discomfort about three weeks ago.  THe pain she is having is \"poking\" pain in the left lower breast and if she lays on it she feels a ripping sensation.  She cannot tolerate her sports bras.  .  The swelling has worsened since onset.  There has been no new numbness, tingling or weakness.  There have been no new masses, rashes, or swellings of any other joints. There have been no infections.  There has been no new unexplained weight loss, loss of appetite, nausea and/or vomiting, shortness of breath, weight loss and chest pain. The swelling does not decrease with elevation\". The swelling is worse in the morning. Linda REAL Canada complains of pain in the involved extremity and pain is rated a 4 on a 10 point scale.  This is again mainly the poking sensation.  It is noted she still may need re excision lumpectomy as the " Noted.   Forwarded to [facility].     deep margin was not clear per Dr. Kwong.      Past Medical History:    Past Medical History:   Diagnosis Date     Anxiety      Breast cancer 05/31/2017    left breast       Surgical History:   Past Surgical History:   Procedure Laterality Date     BREAST SURGERY Left 05/31/2017    left lumpectomy       Medications:    Current Outpatient Prescriptions:      acetaminophen (TYLENOL) 500 MG tablet, Take 1,000 mg by mouth every 4 (four) hours as needed for pain., Disp: , Rfl:      dexamethasone (DECADRON) 4 MG tablet, Take 8mg every 12 hours for 3 days, starting the day before chemotherapy., Disp: 36 tablet, Rfl: 1     diazePAM (VALIUM) 5 MG tablet, Take 5 mg by mouth 2 (two) times a day as needed for anxiety., Disp: , Rfl:      docusate sodium (COLACE) 100 MG capsule, Take 100 mg by mouth 2 (two) times a day as needed. , Disp: , Rfl:      FLUoxetine (PROZAC) 20 MG capsule, Take 50 mg by mouth daily., Disp: , Rfl:      HYDROcodone-acetaminophen 5-325 mg per tablet, 1 tablet every 4 (four) hours as needed. , Disp: , Rfl:      hydrOXYzine (ATARAX) 25 MG tablet, 25 mg every 4 (four) hours as needed. , Disp: , Rfl:      lidocaine-prilocaine (EMLA) cream, Apply 30-45 minutes before the port access., Disp: 30 g, Rfl: 3     loperamide (IMODIUM A-D) 2 mg tablet, Start with 4 mg, followed by 2 mg after each loose stool (maximum: 16 mg/day), Disp: 30 tablet, Rfl: 0     loratadine (CLARITIN) 10 mg tablet, Take 1 tablet (10 mg total) by mouth daily., Disp: 30 tablet, Rfl: 0     naproxen sodium (ALEVE) 220 MG tablet, Take 220 mg by mouth as needed for pain., Disp: , Rfl:      omeprazole (PRILOSEC) 20 MG capsule, Take 1 capsule (20 mg total) by mouth daily before breakfast., Disp: 30 capsule, Rfl: 3     ondansetron (ZOFRAN, AS HYDROCHLORIDE,) 4 MG tablet, Take 1 tablet (4 mg total) by mouth daily as needed for nausea., Disp: 30 tablet, Rfl: 1     prochlorperazine (COMPAZINE) 10 MG tablet, Take 1 tablet (10 mg total) by mouth every 6  (six) hours as needed (For breakthrough nausea/vomiting)., Disp: 30 tablet, Rfl: 1     QUEtiapine (SEROQUEL) 25 MG tablet, 25 mg at bedtime. , Disp: , Rfl:      ranitidine (ZANTAC) 150 MG tablet, Take 150 mg by mouth. prn, Disp: , Rfl:      traZODone (DESYREL) 50 MG tablet, Take 50 mg by mouth at bedtime. , Disp: , Rfl:      zinc oxide 25 % Pste, Apply to the buttock after each bowel movement, Disp: 500 g, Rfl: 3    Allergies:   Allergies   Allergen Reactions     Penicillins Rash       Family history:   Family History   Problem Relation Age of Onset     No Medical Problems Mother      Lung cancer Father 78     Diabetes Sister      Skin cancer Sister      Hypertension Sister        Social History:   Social History     Social History     Marital status: Single     Spouse name: N/A     Number of children: N/A     Years of education: N/A     Occupational History     Not on file.     Social History Main Topics     Smoking status: Former Smoker     Packs/day: 1.50     Years: 12.00     Types: Cigarettes     Quit date: 6/16/2002     Smokeless tobacco: Never Used     Alcohol use No      Comment: states has not drank in a couple months     Drug use: No     Sexual activity: No     Other Topics Concern     Not on file     Social History Narrative         Labs:   No results found for: SEDRATE      No results found for: CRP        Lab Results   Component Value Date    CREATININE 0.77 09/15/2017      No results found for: HGBA1C        Lab Results   Component Value Date    BUN 20 09/15/2017              Lab Results   Component Value Date    ALBUMIN 3.8 09/15/2017       No results found for: [id number]    No results found for: TSH  Lab Results   Component Value Date    WBC 12.3 (H) 09/15/2017    HGB 9.9 (L) 09/15/2017    HCT 29.9 (L) 09/15/2017    MCV 90 09/15/2017     (L) 09/15/2017       Imaging:    PET FDG/CT  6/13/2017 12:26 PM     INDICATION: Breast cancer, left, staging. Initial treatment strategy.  TECHNIQUE: Serum  glucose level 112 mg/dL. One hour post right antecubital intravenous administration of 8.7 mCi F-18 FDG, PET imaging was performed from the skull base to the mid thighs utilizing attenuation correction with concurrent axial CT and PET/CT   image fusion. Dose reduction techniques were used.  COMPARISON: None.     FINDINGS:  HEAD AND NECK: Normal physiologic FDG uptake.     CHEST: Postoperative changes left breast lumpectomy and left axillary lymphadenectomy with associated mild inflammatory uptake about the operative sites. A prominent 0.6 x 0.9 cm left axillary level II node adjacent to a surgical clip (axial fused image   109) demonstrates mild uptake (SUVmax 2.7). Subcutaneous postoperative fluid collection in the left mastectomy bed measures 4.8 x 8.2 x 5.6 cm containing an area of nondependent gas. Mild overlying skin thickening. Normal physiologic FDG uptake   elsewhere. Right IJ Port-A-Cath with tip in the RA.     ABDOMEN/PELVIS: Normal physiologic FDG uptake.     MUSCULOSKELETAL: No suspicious focal skeletal uptake to suggest osseous metastasis.     IMPRESSION:   CONCLUSION:   1. Postoperative changes left breast and axilla. Nonspecific subcutaneous postoperative fluid collection.  2. A prominent mildly FDG avid left axillary level II node is nonspecific, but suspicious for a sofia metastasis, although could be inflammatory related to recent surgical dissection.           Review of Symptoms:  Full 12 point review of systems is negative, except as noted above.    Physical Exam:      Vital Signs: /64 (Patient Site: Right Arm, Patient Position: Sitting, Cuff Size: Adult Large)  Pulse 88  Temp 98.3  F (36.8  C) (Oral)   Resp 16     Circumferential Volume Measures:    Vasc Edema 9/25/2017   Right-just above MCP 14.5   Right Wrist 14.7   Right Up 10cm 21.0   Right Up 10cm From Elbow 29.0   Left-just above MCP 17.8   Left Wrist 15.0   Left Up 10cm 21.0   Left Up 10cm From Elbow 30.5     General: Alert and  oriented X 3, in no apparent distress. Affect normal.    Range of Motion:  Range of motion of elbows, wrists is normal bilaterally without active joint synovitis, erythema, joint swelling, crepitus or joint laxity.  Range of motion of the neck is full with Spurling's maneuver negative.  Right shoulder range of motion is full and left is to 178 forward flexion and abduction.  There is significant cording on the left anterior axilla.  Palpation of this area causes recurrence of her pain symptoms in the left lateral breast area.    Sensation: Intact to pinprick and light touch throughout the upper extremities bilaterally.      Strength:  Normal strength testing in shoulder abduction, elbow flexion, elbow extension, wrist extension, forearm supination, forearm pronation, hand intrinsics, internal rotation and external rotation of the shoulder shoulders bilaterally.      Deep Tendon Reflexes:  Normal biceps, triceps and brachioradialis bilaterally.    Lymph nodes: No cervical, supraclavicular, infraclavicular, or axillary lymphadenopathy palpated.    Vascular: No unusual venous distention.  Radial arterial pulses strong and equal at the wrists bilaterally.     Skin: No unusual rubor, calor, masses or rashes along the skin in either arm.  No significant fibrosity or scarring.  No pain to palpation.     Impression: 62 y.o. female with   1. left arm swelling and left arm/shoulder tightness  2.  Secondary post mastectomy lymphedema  3.  Left shoulder contracture with cording  4.  Breast carcinoma (5/2017 left lumpectomy with LND, chemo/rad )    Plan:        1. Swelling was discussed in detail with the patient.  Questions were answered.  2.  Physical/Occupational Therapy for swelling.  This will include exercise, range of motion work on shoulder contracture and and education.  The goal is to keep her range of motion where it is and also to keep the swelling under control.  She will still be needing further treatment for her  carcinoma.  This therapy will just require a couple of visits and we will help incorporate her caregiver\partner to help her with this.  She is in agreement.  3.  Appropriate compression garments will be obtained. Script was completed.  Wearing reviewed.  While she is still undergoing treatment she will wear the compression as much as she is able to tolerate.  4.  Dragon Diva information given.  5.  Follow up with me 3-4 months.    Thank you very much for this consult.  If you have any questions please feel free to contact me at [phone].    Time spent with patient 60 minutes with greater than 50% time in education counseling and coordination of cares.      Margaret Domingo MD, ABWMS, FACCWS, [facility]  Medical Director Wound Care and Lymphedema  [facility]  [phone]

## 2022-07-23 ENCOUNTER — HEALTH MAINTENANCE LETTER (OUTPATIENT)
Age: 67
End: 2022-07-23

## 2022-10-01 ENCOUNTER — HEALTH MAINTENANCE LETTER (OUTPATIENT)
Age: 67
End: 2022-10-01

## 2023-08-06 ENCOUNTER — HEALTH MAINTENANCE LETTER (OUTPATIENT)
Age: 68
End: 2023-08-06

## 2023-10-30 NOTE — CONSULTS
CC:  Nusrat Xavier is here today for Back Pain (Trigger thumb )    Patient has been experiencing pain with left trigger thumb onset of a few weeks. Patient has been dealing with chronic back pain.     Medications: medications verified, no change  Added preferred pharmacy  Refills needed today?  NO    denies Latex allergy or sensitivity    Health Maintenance Due   Topic Date Due   • COVID-19 Vaccine (6 - 2023-24 season) 09/01/2023   • Depression Screening  02/23/2024       Patient is due for the topics as listed above and wishes to proceed with them. md to discuss    Patient would like communication of their results via:      [employer]    Cell Phone:   Telephone Information:   Mobile [phone]     Okay to leave a message containing results? Yes     [facility] Hematology and Oncology Progress Note    Patient: Linda Canada  MRN: [id number]  Date of Service: 06/16/2017        Reason for Visit    Referred by Dr. Kwong regarding breast cancer    Assessment and Plan  Malignant neoplasm of upper-outer quadrant of left female breast    Staging form: Breast, AJCC 7th Edition      Clinical: No stage assigned - Unsigned      Pathologic stage from 6/14/2017: Stage IIIA (T2, N2a, cM0) - Signed by Joie Ferro MD on 6/14/2017      ECOG Performance   ECOG Performance Status: 1     Distress Assessment  Distress Assessment Score: Unable to rate    Pain  Currently in Pain: Yes  Pain Score (Initial OR Reassessment): 7  Location: Left Breast    A 63 yo postmenopausal female with a stage IIIA (pT2, pN2a, M0) invasive ductal carcinoma of the left breast- grade 3, ER (high positive), [place] (positive) and HER2 (positive) with associated high-grade DCIS.  Final margins for invasive carcinoma and DCIS are positive.  S/p left breast lumpectomy, left axillary sentinel lymph node biopsy and a left axillary lymph node dissection on 5/31/17.      We reviewed all of the available clinical, laboratory, radiographic, and pathologic data. We reviewed the significance of invasive breast cancer including the natural history and likelihood of local and distant recurrence in the setting of HR positive and HER2 positive breast cancer.   We reviewed We reviewed NCCN guidelines for adjuvant chemotherapy in HER-2 positive early stage breast cancer.  She has a larger tumor with lymph node involvement, we strongly consider using to a double HER-2 directed therapy instead of one.  I offered the chemotherapy regimen of docetaxel, carboplatin, trastuzumab and pertuzumab given every 21 days for total 6 cycles followed by trastuzumab alone every three-week scheduled to complete 1 year of therapy.  I reviewed the chemotherapy regimen again today including schedule, side effects of each chemotherapy drugs.  I  discussed the details side effects of each chemotherapy drugs including, but not limited to, nausea, vomiting, mucositis, cytopenia, increased results of infection, or bleeding, cardiac toxicity, renal and hepatic injury, joint aches, skin rash, peripheral neuropathy, mental fogginess, long-term risk of leukemia, and secondary malignancy. I give her chemotherapy handouts.   She already has a port placed.     I explained to the patient that we will follow up with Dr. Kwong at the end of 6 cycles of chemotherapy to discuss whether or not to proceed with a reexcision.      And I also discussed briefly about adjuvant endocrine therapy to initiate after that. She will be on AI/tamoxifen for 5 years. I did not discuss detail about tamoxifen not to overwhlem with a lot of information at this point, but I will discuss in detail prior to starting tamoxifen.      - Cardiac MUGA scan to assess for baseline cardiac function  - Chemotherapy class to attend.  - Follow up with me on 6/23/17 with labs and prior to starting cycle 1 chemotherapy.  - She will be closely follow with her psychologist from [facility] for the next several months.  -Our RN nurse navigator will assist in our financial assistance and to review other resources.  - In the meantime, she is encouraged to call me with questions.     Chemotherapy schedule    Docetaxel (Taxotere) 75 mg/m2 IV once on day 1     Carboplatin (Paraplatin) AUC 6 IV once on day 1     Trastuzumab (Herceptin) 8 mg/kg IV once on cycle 1 day 1; then on subsequent cycles Trastuzumab (Herceptin) is 6 mg/kg IV once on day 1     Pertuzumab (Perjeta) 840 mg IV once on cycle 1 day 1; then on subsequent cycles Pertuzumab (Perjeta) is 420 mg IV once on day 1   21-day cycle for 6 cycles, then      Trastuzumab (Herceptin) 6 mg/kg IV once on day 1 every 21-day to complete 1 year.    #. Depression/anxiety- chronic with recent exacerbation due to recent diagnosis of breast cancer.   -She would  like to continue to work with her own psychologist at [facility].  She denies any other additional intervention at this point.  We will closely follow while she is on therapy.    Problem List    1. Malignant neoplasm of upper-outer quadrant of left female breast  Education (Chemo Class)    dexamethasone (DECADRON) 4 MG tablet    prochlorperazine (COMPAZINE) 10 MG tablet    Infusion Appointment    CC OFFICE VISIT LONG    NM Muga      ______________________________________________________________________________    Diagnosis  Stage IIIA (pT2, pN2a, M0) invasive ductal carcinoma of the left breast- Dx   - ER (high positive, 80-90%), ND (high positive, 90%) HER2 (+ by FISH by avg HER2 signals 5.3, HER2/CEP17 ratio 2.3)  - Gary grade 3  - Tumor size: 26 mm  - Margins positive and posterior margin for invasive component and positive for posterior margin for DCIS.  - Angiolymphatic invasion present.  - 5/12 left axillary lymph node positive for carcinoma  - associated DCIS, grade 2, solid and cribriform with focal comedonecrosis.  20%,     PET scan did not show distant metastases.      Therapy to date:  5/31/17 - left partial mastectomy and Left axillary lymph node dissection    History of Present Illness    [employer]  was used for this encounter.    Ms. Linda Canada is a very pleasant 62-year-old female accompanied by her partner, Alma.    She found a lump in her left breast a few months prior to seeking attention and a left breast mass was found on exam as well as on a mammogram.  She reported that she has a breast cyst in the right breast which has been watching for a while.  She recently moved from [place].  She underwent a left breast lumpectomy with left axillary lymph node dissection by Dr. Kwong on 5/31/2017.  She still has some pain and stiffness in her left arm post surgery.  She has been taking Aleve about 6 tablets a day.  She could not tolerate opioids.  She  "admitted that her mood has been fairly down in the last several weeks due to recent diagnosis of breast cancer.  She has chronic depression and anxiety and she was seen she has been seen by a psychologist at [facility] (her primary clinic).  She saw her psychologist yesterday and she agreed to continue to work with her.  She is on medication for her depression/anxiety.  She denies homicidal or suicidal ideation.  However she has been having some hallucination in the last few days and some blackouts moments from stress.    She is fairly active.  No major medical issues except depression and anxiety.    Reproductive history  She is postmenopausal for over 5 years.  She is G0.  She was never been on any hormonal therapy.      Social history  He lives in a mobile home.  She is single and she has a female life partner.  Never had children.  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  She quit smoking about 15 years ago.  She does not drink alcohol.  She denies any recreational drug use.    Family history  Father-lung cancer at age 78.  Sister-skin cancer (unknown type)    Medications reviewed.    Pain Status  Currently in Pain: Yes      Review of Systems  Recently she has fatigue, night sweats and muscle weakness and weight gain about 2 pounds since starting new medication for her depression.  She has shortness of breath with anxiety episode and lightheadedness.  She has heartburn and chronic constipation.  She also recently noted to have muscle pain and stiffness and using a cane for stability.  She had blackouts spells a few times since she was diagnosed with breast cancer.  She also experienced panic attacks.  Apart from that, the remainder of the comprehensive review of system was negative.    Past History  Past Medical History:   Diagnosis Date     Anxiety      Breast cancer 05/31/2017    left breast       Physical Exam    Recent Vitals 6/16/2017   Height 5' 1.5\"   Weight 162 lbs 11 oz   BSA (m2) " 1.79 m2   /71   Pulse 67   Temp 97.8   Temp src 1   SpO2 95     General: alert, awake, not in acute distress  HEENT: Head: Normal, normocephalic, atraumatic.  Eye: Normal external eye, conjunctiva, lids cornea, BALA.  Ears:  Non-tender.  Nose: Normal external nose, mucus membranes and septum.  Pharynx: Dental Hygiene adequate. Normal buccal mucosa. Normal pharynx.  Neck / Thyroid: Supple, no masses, nodes, nodules or enlargement.  Lymphatics: No abnormally enlarged lymph nodes.  Chest: Normal chest wall and respirations. Clear to auscultation.  Breasts: both nipples are everted.  Postsurgical scar with Steri-Strips on the left breast.  Tenderness to touch.  Heart: S1 S2 RRR, no murmur.   Abdomen: abdomen is soft without significant tenderness, masses, organomegaly or guarding  Extremities: normal strength, tone, and muscle mass  Skin: normal. no rash or abnormalities  CNS: non focal.      Lab Results    Recent Results (from the past 168 hour(s))   POCT Glucose   Result Value Ref Range    Glucose,  mg/dL       Imaging    Nm Pet Ct Skull To Mid Thigh    Result Date: 6/13/2017  PET FDG/CT 6/13/2017 12:26 PM INDICATION: Breast cancer, left, staging. Initial treatment strategy. TECHNIQUE: Serum glucose level 112 mg/dL. One hour post right antecubital intravenous administration of 8.7 mCi F-18 FDG, PET imaging was performed from the skull base to the mid thighs utilizing attenuation correction with concurrent axial CT and PET/CT  image fusion. Dose reduction techniques were used. COMPARISON: None. FINDINGS: HEAD AND NECK: Normal physiologic FDG uptake. CHEST: Postoperative changes left breast lumpectomy and left axillary lymphadenectomy with associated mild inflammatory uptake about the operative sites. A prominent 0.6 x 0.9 cm left axillary level II node adjacent to a surgical clip (axial fused image 109) demonstrates mild uptake (SUVmax 2.7). Subcutaneous postoperative fluid collection in the left  mastectomy bed measures 4.8 x 8.2 x 5.6 cm containing an area of nondependent gas. Mild overlying skin thickening. Normal physiologic FDG uptake elsewhere. Right IJ Port-A-Cath with tip in the RA. ABDOMEN/PELVIS: Normal physiologic FDG uptake. MUSCULOSKELETAL: No suspicious focal skeletal uptake to suggest osseous metastasis.     CONCLUSION: 1. Postoperative changes left breast and axilla. Nonspecific subcutaneous postoperative fluid collection. 2. A prominent mildly FDG avid left axillary level II node is nonspecific, but suspicious for a sofia metastasis, although could be inflammatory related to recent surgical dissection.         Signed by: Joie Ferro MD

## 2023-12-24 ENCOUNTER — HEALTH MAINTENANCE LETTER (OUTPATIENT)
Age: 68
End: 2023-12-24